# Patient Record
Sex: FEMALE | Race: WHITE | NOT HISPANIC OR LATINO | Employment: OTHER | URBAN - METROPOLITAN AREA
[De-identification: names, ages, dates, MRNs, and addresses within clinical notes are randomized per-mention and may not be internally consistent; named-entity substitution may affect disease eponyms.]

---

## 2023-03-13 ENCOUNTER — TELEPHONE (OUTPATIENT)
Dept: PRIMARY CARE | Facility: CLINIC | Age: 63
End: 2023-03-13

## 2023-03-16 DIAGNOSIS — E78.01 FAMILIAL HYPERCHOLESTEROLEMIA: Primary | ICD-10-CM

## 2023-03-16 PROBLEM — M19.90 INFLAMMATORY OSTEOARTHRITIS: Status: ACTIVE | Noted: 2023-03-16

## 2023-03-16 PROBLEM — I10 HYPERTENSION: Status: ACTIVE | Noted: 2023-03-16

## 2023-03-16 PROBLEM — E78.5 HYPERLIPIDEMIA: Status: ACTIVE | Noted: 2023-03-16

## 2023-03-16 PROBLEM — G47.00 INSOMNIA: Status: ACTIVE | Noted: 2023-03-16

## 2023-03-16 PROBLEM — F32.A MILD DEPRESSION: Status: ACTIVE | Noted: 2023-03-16

## 2023-03-16 RX ORDER — LOSARTAN POTASSIUM 100 MG/1
1 TABLET ORAL DAILY
COMMUNITY
Start: 2021-12-09 | End: 2023-08-31

## 2023-03-16 RX ORDER — FLUTICASONE PROPIONATE 50 MCG
SPRAY, SUSPENSION (ML) NASAL
COMMUNITY
Start: 2022-11-30 | End: 2023-10-06 | Stop reason: ALTCHOICE

## 2023-03-16 RX ORDER — LOSARTAN POTASSIUM AND HYDROCHLOROTHIAZIDE 25; 100 MG/1; MG/1
1 TABLET ORAL DAILY
COMMUNITY
Start: 2022-12-14 | End: 2023-10-06 | Stop reason: ALTCHOICE

## 2023-03-16 RX ORDER — HYDROCHLOROTHIAZIDE 25 MG/1
1 TABLET ORAL DAILY
COMMUNITY
Start: 2021-12-09 | End: 2023-10-20 | Stop reason: ALTCHOICE

## 2023-03-16 RX ORDER — BUPROPION HYDROCHLORIDE 150 MG/1
TABLET ORAL
COMMUNITY
Start: 2023-03-06

## 2023-03-16 RX ORDER — ESCITALOPRAM OXALATE 20 MG/1
TABLET ORAL
COMMUNITY

## 2023-03-16 RX ORDER — ATORVASTATIN CALCIUM 40 MG/1
40 TABLET, FILM COATED ORAL NIGHTLY
COMMUNITY
End: 2023-03-16 | Stop reason: SDUPTHER

## 2023-03-16 RX ORDER — ATORVASTATIN CALCIUM 40 MG/1
40 TABLET, FILM COATED ORAL NIGHTLY
Qty: 90 TABLET | Refills: 3 | Status: SHIPPED | OUTPATIENT
Start: 2023-03-16 | End: 2023-10-06 | Stop reason: ALTCHOICE

## 2023-04-03 DIAGNOSIS — E78.01 FAMILIAL HYPERCHOLESTEROLEMIA: Primary | ICD-10-CM

## 2023-04-05 LAB
ACTIVATED PARTIAL THROMBOPLASTIN TIME IN PPP BY COAGULATION ASSAY: 31 SEC (ref 26–39)
ANION GAP IN SER/PLAS: 13 MMOL/L (ref 10–20)
CALCIUM (MG/DL) IN SER/PLAS: 8.9 MG/DL (ref 8.6–10.3)
CARBON DIOXIDE, TOTAL (MMOL/L) IN SER/PLAS: 25 MMOL/L (ref 21–32)
CHLORIDE (MMOL/L) IN SER/PLAS: 99 MMOL/L (ref 98–107)
CREATININE (MG/DL) IN SER/PLAS: 0.63 MG/DL (ref 0.5–1.05)
ERYTHROCYTE DISTRIBUTION WIDTH (RATIO) BY AUTOMATED COUNT: 13.4 % (ref 11.5–14.5)
ERYTHROCYTE MEAN CORPUSCULAR HEMOGLOBIN CONCENTRATION (G/DL) BY AUTOMATED: 32.7 G/DL (ref 32–36)
ERYTHROCYTE MEAN CORPUSCULAR VOLUME (FL) BY AUTOMATED COUNT: 93 FL (ref 80–100)
ERYTHROCYTES (10*6/UL) IN BLOOD BY AUTOMATED COUNT: 3.68 X10E12/L (ref 4–5.2)
GFR FEMALE: >90 ML/MIN/1.73M2
GLUCOSE (MG/DL) IN SER/PLAS: 87 MG/DL (ref 74–99)
HEMATOCRIT (%) IN BLOOD BY AUTOMATED COUNT: 34.2 % (ref 36–46)
HEMOGLOBIN (G/DL) IN BLOOD: 11.2 G/DL (ref 12–16)
INR IN PPP BY COAGULATION ASSAY: 0.9 (ref 0.9–1.1)
LEUKOCYTES (10*3/UL) IN BLOOD BY AUTOMATED COUNT: 7.1 X10E9/L (ref 4.4–11.3)
NRBC (PER 100 WBCS) BY AUTOMATED COUNT: 0 /100 WBC (ref 0–0)
PLATELETS (10*3/UL) IN BLOOD AUTOMATED COUNT: 311 X10E9/L (ref 150–450)
POTASSIUM (MMOL/L) IN SER/PLAS: 3 MMOL/L (ref 3.5–5.3)
PROTHROMBIN TIME (PT) IN PPP BY COAGULATION ASSAY: 10.9 SEC (ref 9.8–13.4)
SODIUM (MMOL/L) IN SER/PLAS: 134 MMOL/L (ref 136–145)
UREA NITROGEN (MG/DL) IN SER/PLAS: 8 MG/DL (ref 6–23)

## 2023-04-24 ENCOUNTER — HOSPITAL ENCOUNTER (OUTPATIENT)
Dept: DATA CONVERSION | Facility: HOSPITAL | Age: 63
End: 2023-04-24
Attending: THORACIC SURGERY (CARDIOTHORACIC VASCULAR SURGERY) | Admitting: THORACIC SURGERY (CARDIOTHORACIC VASCULAR SURGERY)
Payer: MEDICARE

## 2023-04-24 DIAGNOSIS — C34.11 MALIGNANT NEOPLASM OF UPPER LOBE, RIGHT BRONCHUS OR LUNG (MULTI): ICD-10-CM

## 2023-04-24 DIAGNOSIS — R91.8 OTHER NONSPECIFIC ABNORMAL FINDING OF LUNG FIELD: ICD-10-CM

## 2023-04-26 LAB
COMPLETE PATHOLOGY REPORT: NORMAL
CONVERTED ADDENDUM DIAGNOSIS 2: NORMAL
CONVERTED ADDENDUM DIAGNOSIS 3: NORMAL
CONVERTED CLINICAL DIAGNOSIS-HISTORY: NORMAL
CONVERTED FINAL DIAGNOSIS: NORMAL
CONVERTED FINAL REPORT PDF LINK TO COPY AND PASTE: NORMAL
CONVERTED GROSS DESCRIPTION: NORMAL

## 2023-06-07 ENCOUNTER — PATIENT OUTREACH (OUTPATIENT)
Dept: CARE COORDINATION | Facility: CLINIC | Age: 63
End: 2023-06-07

## 2023-06-09 ENCOUNTER — PATIENT OUTREACH (OUTPATIENT)
Dept: CARE COORDINATION | Facility: CLINIC | Age: 63
End: 2023-06-09

## 2023-06-09 SDOH — ECONOMIC STABILITY: GENERAL: WOULD YOU LIKE HELP WITH ANY OF THE FOLLOWING NEEDS?: I DONT NEED HELP WITH ANY OF THESE

## 2023-06-09 NOTE — PROGRESS NOTES
Outreach call to patient to support a smooth transition of care from recent admission.  Spoke with patient, reviewed discharge medications, discharge instructions, assessed social needs, and provided education on importance of follow-up appointment with provider. Enrolled patient in [employer] for additional support and education through transition period.  Will continue to monitor through transition period.  Engagement  Call Start Time: 1227 (6/9/2023 12:24 PM)    Medications  Medications reviewed with patient/caregiver?: Yes (6/9/2023 12:24 PM)  Is the patient having any side effects they believe may be caused by any medication additions or changes?: No (6/9/2023 12:24 PM)  Does the patient have all medications ordered at discharge?: Yes (6/9/2023 12:24 PM)  Is the patient taking all medications as directed (includes completed medication regime)?: Yes (6/9/2023 12:24 PM)  Care Management Interventions: Provided patient education (6/9/2023 12:24 PM)    Appointments  Does the patient have a primary care provider?: Yes (6/9/2023 12:24 PM)  Care Management Interventions: Verified appointment date/time/provider (Patient has follow-up appointments on Monday 6/12 with PCP and the following Monday with Dr. Spencer) (6/9/2023 12:24 PM)  Care Management Interventions: Advised patient to keep appointment (6/9/2023 12:24 PM)    Patient Teaching  Does the patient have access to their discharge instructions?: Yes (6/9/2023 12:24 PM)  Care Management Interventions: Reviewed instructions with patient (6/9/2023 12:24 PM)  What is the patient's perception of their health status since discharge?: Improving (6/9/2023 12:24 PM)    Wrap Up  Wrap Up Additional Comments: Post-discharge completed with patient.  Patient reported that she has her follow up visits scheduled and is doing good.  Patient has support with monitoring incision site from her daughter.  Patient will have PCP remove and change bandage if needed at follow up.    Patient reported pain in managed.  No other concerns voiced. (6/9/2023 12:24 PM)  Call End Time: 1240 (6/9/2023 12:24 PM)      Isa BRISCOE RN CCM

## 2023-06-12 ENCOUNTER — OFFICE VISIT (OUTPATIENT)
Dept: PRIMARY CARE | Facility: CLINIC | Age: 63
End: 2023-06-12
Payer: COMMERCIAL

## 2023-06-12 VITALS
HEART RATE: 73 BPM | WEIGHT: 130 LBS | BODY MASS INDEX: 22.67 KG/M2 | DIASTOLIC BLOOD PRESSURE: 65 MMHG | TEMPERATURE: 97.7 F | SYSTOLIC BLOOD PRESSURE: 124 MMHG

## 2023-06-12 DIAGNOSIS — G89.18 POSTOPERATIVE PAIN: ICD-10-CM

## 2023-06-12 DIAGNOSIS — C34.11 PRIMARY MALIGNANT NEOPLASM OF RIGHT UPPER LOBE OF LUNG (MULTI): Primary | ICD-10-CM

## 2023-06-12 DIAGNOSIS — C34.91 ADENOCARCINOMA OF RIGHT LUNG (MULTI): ICD-10-CM

## 2023-06-12 DIAGNOSIS — E87.8 ELECTROLYTE ABNORMALITY: ICD-10-CM

## 2023-06-12 PROBLEM — M81.0 OSTEOPOROSIS: Status: ACTIVE | Noted: 2023-06-12

## 2023-06-12 PROBLEM — F17.210 CIGARETTE SMOKER: Status: ACTIVE | Noted: 2023-06-12

## 2023-06-12 PROCEDURE — 3074F SYST BP LT 130 MM HG: CPT | Performed by: FAMILY MEDICINE

## 2023-06-12 PROCEDURE — 3078F DIAST BP <80 MM HG: CPT | Performed by: FAMILY MEDICINE

## 2023-06-12 PROCEDURE — 99214 OFFICE O/P EST MOD 30 MIN: CPT | Performed by: FAMILY MEDICINE

## 2023-06-12 PROCEDURE — 1036F TOBACCO NON-USER: CPT | Performed by: FAMILY MEDICINE

## 2023-06-12 RX ORDER — ALPRAZOLAM 0.5 MG/1
0.5 TABLET ORAL AS NEEDED
COMMUNITY
Start: 2014-05-20

## 2023-06-12 ASSESSMENT — PATIENT HEALTH QUESTIONNAIRE - PHQ9
1. LITTLE INTEREST OR PLEASURE IN DOING THINGS: NOT AT ALL
2. FEELING DOWN, DEPRESSED OR HOPELESS: NOT AT ALL
SUM OF ALL RESPONSES TO PHQ9 QUESTIONS 1 AND 2: 0

## 2023-06-12 NOTE — PROGRESS NOTES
Subjective   Patient ID: Dora Dueñas is a 62 y.o. female who presents for Follow-up (On surgery of the right  lug.).  Very pleasant 62-year-old here today for hospital follow-up  Had an abnormal CT scan was diagnosed with lung cancer underwent resection and she is here today for follow-up  She is having some persistent postoperative pain is somewhat tired but for the most part is doing better her breathing is getting better she did have some significant electrolyte abnormalities pre and postsurgery has been feeling a bit weak and tired no fever  She has been doing her incentive spirometry she has a lot of discomfort particularly around her chest having some constipation a lot of incisional discomfort no vomiting her appetite has been decreased she has been recovering well still struggling to sleep  She is relieved that things are over is waiting to hear her pathology and the plan for how to proceed  She did have a single bout of significant weakness and she is feeling a little bit better each day        Review of Systems  Constitutional: no chills, no fever and no night sweats.   Eyes: no blurred vision and no eyesight problems.   ENT: no hearing loss, no nasal congestion, no nasal discharge, no hoarseness and no sore throat.   Cardiovascular: no chest pain, no intermittent leg claudication, no lower extremity edema, no palpitations and no syncope.   Respiratory: no cough, no shortness of breath during exertion, no shortness of breath at rest and no wheezing.   Gastrointestinal: no abdominal pain, no blood in stools, no constipation, no diarrhea, no melena, no nausea, no rectal pain and no vomiting.   Genitourinary: no dysuria, no change in urinary frequency, no urinary hesitancy, no feelings of urinary urgency and no vaginal discharge.   Musculoskeletal: no arthralgias,  no back pain and no myalgias.   Integumentary: no new skin lesions and no rashes.   Neurological: no difficulty walking, no headache, no limb  weakness, no numbness and no tingling.   Psychiatric: no anxiety, no depression, no anhedonia and no substance use disorders.   Endocrine: no recent weight gain and no recent weight loss.   Hematologic/Lymphatic: no tendency for easy bruising and no swollen glands .    Objective    /65   Pulse 73   Temp 36.5 °C (97.7 °F)   Wt 59 kg (130 lb)   BMI 23.03 kg/m²    Physical Exam  The patient appeared well nourished and normally developed. Vital signs as documented. Head exam is unremarkable. No scleral icterus or corneal arcus noted.  Pupils are equal round reactive to light extraocular movements are intact no hemorrhages noted on funduscopic exam mouth mucous membranes are moist no exudates ears canals clear TMs are gray pearly not injected nose no rhinorrhea or epistaxis Neck is without jugular venous distension, thyromegaly, or carotid bruits. Carotid upstrokes are brisk bilaterally. Lungs are clear to auscultation and percussion. Cardiac exam reveals the PMI to be normally sized and situated. Rhythm is regular. First and second heart sounds normal. No murmurs, rubs or gallops. Abdominal exam reveals normal bowel sounds, no masses, no organomegaly and no aortic enlargement. Extremities are nonedematous and both femoral and pedal pulses are normal.  Neurologic exam DTRs are equal bilaterally no focal deficits strength is symmetrical heme lymph no palpable lymph nodes in the neck axilla or groin  Recent surgical incisions healing well clean and dry  Assessment/Plan   Problem List Items Addressed This Visit       Adenocarcinoma of right lung (CMS/HCC)     Followup surgery  Gradually improving  Continue incentive spirometry         Primary malignant neoplasm of right upper lobe of lung (CMS/HCC) - Primary     Stable recovering  Continue to follow-up with oncology         Electrolyte abnormality    Relevant Orders    Magnesium    Potassium    Postoperative pain     Status post surgery  Here for hospital  follow-up  Continue incentive spirometry  Check blood work for electrolyte abnormality  Make sure you are eating enough protein and staying hydrated  Follow-up in 2 months  Consider pulmonary rehab       Sonia Vuong MD

## 2023-06-27 ENCOUNTER — TELEPHONE (OUTPATIENT)
Dept: CARE COORDINATION | Facility: CLINIC | Age: 63
End: 2023-06-27

## 2023-06-27 ENCOUNTER — DOCUMENTATION (OUTPATIENT)
Dept: CARE COORDINATION | Facility: CLINIC | Age: 63
End: 2023-06-27

## 2023-06-27 PROBLEM — G89.18 POSTOPERATIVE PAIN: Status: ACTIVE | Noted: 2023-06-27

## 2023-06-27 PROBLEM — R35.0 URINARY FREQUENCY: Status: RESOLVED | Noted: 2023-06-27 | Resolved: 2023-06-27

## 2023-06-27 PROBLEM — K64.4 EXTERNAL HEMORRHOIDS: Status: ACTIVE | Noted: 2022-03-30

## 2023-06-27 PROBLEM — K63.5 COLON POLYPS: Status: ACTIVE | Noted: 2022-03-30

## 2023-06-27 PROBLEM — E87.8 ELECTROLYTE ABNORMALITY: Status: ACTIVE | Noted: 2023-06-27

## 2023-06-27 PROBLEM — K57.30 COLON, DIVERTICULOSIS: Status: RESOLVED | Noted: 2022-03-30 | Resolved: 2023-06-27

## 2023-06-27 PROBLEM — F32.0 MILD MAJOR DEPRESSION (CMS-HCC): Status: ACTIVE | Noted: 2023-03-16

## 2023-06-27 PROBLEM — F41.9 ANXIETY: Status: ACTIVE | Noted: 2023-06-27

## 2023-06-27 PROBLEM — R68.89 FLU-LIKE SYMPTOMS: Status: RESOLVED | Noted: 2023-06-27 | Resolved: 2023-06-27

## 2023-06-27 NOTE — PROGRESS NOTES
Dora RENDON Spenser  Program: [facility] Generic Post-Discharge  MRN: [id number]  YOB: 1972    This patient had a RED on Mon, 26 Jun 2023 9:33:01 am EDT    Question(s) with flags that caused the Alert (up to last 5 values   recorded)    Question: ROC-7     Date:     2023-06-26     Color:    red     Answers:  ROC-7 =5    Question: GAD2     Date:     2023-06-26     Color:    red     Answers:  2 or more    Question: Signs of Infection     Date:     2023-06-26     Color:    red     Answers:  Red    Question: Since you left the hospital, have you experienced sweating or   shivering?     Date:     2023-06-26     Color:    red     Answers:  Yes    Patient stated that she is doing in good and was just anxious after she left the doctors office because the biopsy results can take up to a month to get back.  Patient stated she is feeling good.  RN CM educated patient to find great memories, hobbies or things she likes to do if her anxiety tries to consume her.  Patient stated she will.  Patient stated, it's just the waiting process that has me nervous.    Isa SHAFERN RN CCM

## 2023-06-28 NOTE — PATIENT INSTRUCTIONS
Hospital follow-up today  Check blood work  Continue incentive spirometry  Recheck in 2 months otherwise as needed  I recommend pulmonary rehab

## 2023-08-02 LAB
BASOPHILS (10*3/UL) IN BLOOD BY AUTOMATED COUNT: 0.1 X10E9/L (ref 0–0.1)
BASOPHILS/100 LEUKOCYTES IN BLOOD BY AUTOMATED COUNT: 1.2 % (ref 0–2)
EOSINOPHILS (10*3/UL) IN BLOOD BY AUTOMATED COUNT: 0.1 X10E9/L (ref 0–0.7)
EOSINOPHILS/100 LEUKOCYTES IN BLOOD BY AUTOMATED COUNT: 1.2 % (ref 0–6)
ERYTHROCYTE DISTRIBUTION WIDTH (RATIO) BY AUTOMATED COUNT: 13.5 % (ref 11.5–14.5)
ERYTHROCYTE MEAN CORPUSCULAR HEMOGLOBIN CONCENTRATION (G/DL) BY AUTOMATED: 33.3 G/DL (ref 32–36)
ERYTHROCYTE MEAN CORPUSCULAR VOLUME (FL) BY AUTOMATED COUNT: 89 FL (ref 80–100)
ERYTHROCYTES (10*6/UL) IN BLOOD BY AUTOMATED COUNT: 4.51 X10E12/L (ref 4–5.2)
HEMATOCRIT (%) IN BLOOD BY AUTOMATED COUNT: 40.3 % (ref 36–46)
HEMOGLOBIN (G/DL) IN BLOOD: 13.4 G/DL (ref 12–16)
IMMATURE GRANULOCYTES/100 LEUKOCYTES IN BLOOD BY AUTOMATED COUNT: 0.4 % (ref 0–0.9)
LEUKOCYTES (10*3/UL) IN BLOOD BY AUTOMATED COUNT: 8.3 X10E9/L (ref 4.4–11.3)
LYMPHOCYTES (10*3/UL) IN BLOOD BY AUTOMATED COUNT: 3.59 X10E9/L (ref 1.2–4.8)
LYMPHOCYTES/100 LEUKOCYTES IN BLOOD BY AUTOMATED COUNT: 43.5 % (ref 13–44)
MONOCYTES (10*3/UL) IN BLOOD BY AUTOMATED COUNT: 0.45 X10E9/L (ref 0.1–1)
MONOCYTES/100 LEUKOCYTES IN BLOOD BY AUTOMATED COUNT: 5.5 % (ref 2–10)
NEUTROPHILS (10*3/UL) IN BLOOD BY AUTOMATED COUNT: 3.98 X10E9/L (ref 1.2–7.7)
NEUTROPHILS/100 LEUKOCYTES IN BLOOD BY AUTOMATED COUNT: 48.2 % (ref 40–80)
NRBC (PER 100 WBCS) BY AUTOMATED COUNT: 0 /100 WBC (ref 0–0)
PLATELETS (10*3/UL) IN BLOOD AUTOMATED COUNT: 362 X10E9/L (ref 150–450)

## 2023-08-03 LAB
ALANINE AMINOTRANSFERASE (SGPT) (U/L) IN SER/PLAS: 12 U/L (ref 7–45)
ALBUMIN (G/DL) IN SER/PLAS: 4.8 G/DL (ref 3.4–5)
ALKALINE PHOSPHATASE (U/L) IN SER/PLAS: 77 U/L (ref 33–136)
ANION GAP IN SER/PLAS: 16 MMOL/L (ref 10–20)
ASPARTATE AMINOTRANSFERASE (SGOT) (U/L) IN SER/PLAS: 17 U/L (ref 9–39)
BILIRUBIN TOTAL (MG/DL) IN SER/PLAS: 0.9 MG/DL (ref 0–1.2)
CALCIUM (MG/DL) IN SER/PLAS: 9.4 MG/DL (ref 8.6–10.6)
CARBON DIOXIDE, TOTAL (MMOL/L) IN SER/PLAS: 26 MMOL/L (ref 21–32)
CHLORIDE (MMOL/L) IN SER/PLAS: 91 MMOL/L (ref 98–107)
CREATININE (MG/DL) IN SER/PLAS: 0.54 MG/DL (ref 0.5–1.05)
GFR FEMALE: >90 ML/MIN/1.73M2
GLUCOSE (MG/DL) IN SER/PLAS: 110 MG/DL (ref 74–99)
MAGNESIUM (MG/DL) IN SER/PLAS: 2.03 MG/DL (ref 1.6–2.4)
POTASSIUM (MMOL/L) IN SER/PLAS: 3.3 MMOL/L (ref 3.5–5.3)
PROTEIN TOTAL: 8.1 G/DL (ref 6.4–8.2)
SODIUM (MMOL/L) IN SER/PLAS: 130 MMOL/L (ref 136–145)
UREA NITROGEN (MG/DL) IN SER/PLAS: 8 MG/DL (ref 6–23)

## 2023-09-01 LAB
ALANINE AMINOTRANSFERASE (SGPT) (U/L) IN SER/PLAS: 48 U/L (ref 7–45)
ALBUMIN (G/DL) IN SER/PLAS: 4.3 G/DL (ref 3.4–5)
ALKALINE PHOSPHATASE (U/L) IN SER/PLAS: 86 U/L (ref 33–136)
ANION GAP IN SER/PLAS: 16 MMOL/L (ref 10–20)
ASPARTATE AMINOTRANSFERASE (SGOT) (U/L) IN SER/PLAS: 25 U/L (ref 9–39)
BILIRUBIN TOTAL (MG/DL) IN SER/PLAS: 0.3 MG/DL (ref 0–1.2)
CALCIUM (MG/DL) IN SER/PLAS: 9.4 MG/DL (ref 8.6–10.6)
CARBON DIOXIDE, TOTAL (MMOL/L) IN SER/PLAS: 28 MMOL/L (ref 21–32)
CHLORIDE (MMOL/L) IN SER/PLAS: 93 MMOL/L (ref 98–107)
CREATININE (MG/DL) IN SER/PLAS: 0.66 MG/DL (ref 0.5–1.05)
GFR FEMALE: >90 ML/MIN/1.73M2
GLUCOSE (MG/DL) IN SER/PLAS: 89 MG/DL (ref 74–99)
MAGNESIUM (MG/DL) IN SER/PLAS: 1.79 MG/DL (ref 1.6–2.4)
POTASSIUM (MMOL/L) IN SER/PLAS: 3.5 MMOL/L (ref 3.5–5.3)
PROTEIN TOTAL: 7 G/DL (ref 6.4–8.2)
SODIUM (MMOL/L) IN SER/PLAS: 133 MMOL/L (ref 136–145)
UREA NITROGEN (MG/DL) IN SER/PLAS: 14 MG/DL (ref 6–23)

## 2023-09-13 ENCOUNTER — APPOINTMENT (OUTPATIENT)
Dept: PRIMARY CARE | Facility: CLINIC | Age: 63
End: 2023-09-13

## 2023-09-25 VITALS — BODY MASS INDEX: 23.73 KG/M2 | WEIGHT: 128.97 LBS | HEIGHT: 62 IN

## 2023-09-25 DIAGNOSIS — C34.11 PRIMARY MALIGNANT NEOPLASM OF RIGHT UPPER LOBE OF LUNG (MULTI): Primary | ICD-10-CM

## 2023-09-25 PROBLEM — C34.91 ADENOCARCINOMA OF RIGHT LUNG (MULTI): Status: RESOLVED | Noted: 2023-06-12 | Resolved: 2023-09-25

## 2023-09-25 RX ORDER — DIPHENHYDRAMINE HYDROCHLORIDE 50 MG/ML
50 INJECTION INTRAMUSCULAR; INTRAVENOUS AS NEEDED
Status: CANCELLED | OUTPATIENT
Start: 2023-10-09

## 2023-09-25 RX ORDER — FAMOTIDINE 10 MG/ML
20 INJECTION INTRAVENOUS ONCE AS NEEDED
Status: CANCELLED | OUTPATIENT
Start: 2023-10-09

## 2023-09-25 RX ORDER — HEPARIN 100 UNIT/ML
500 SYRINGE INTRAVENOUS AS NEEDED
Status: CANCELLED | OUTPATIENT
Start: 2023-10-09

## 2023-09-25 RX ORDER — ALBUTEROL SULFATE 0.83 MG/ML
3 SOLUTION RESPIRATORY (INHALATION) AS NEEDED
Status: CANCELLED | OUTPATIENT
Start: 2023-10-09

## 2023-09-25 RX ORDER — EPINEPHRINE 0.3 MG/.3ML
0.3 INJECTION SUBCUTANEOUS EVERY 5 MIN PRN
Status: CANCELLED | OUTPATIENT
Start: 2023-10-09

## 2023-09-25 RX ORDER — PROCHLORPERAZINE EDISYLATE 5 MG/ML
10 INJECTION INTRAMUSCULAR; INTRAVENOUS EVERY 6 HOURS PRN
Status: CANCELLED | OUTPATIENT
Start: 2023-10-09

## 2023-09-25 RX ORDER — CYANOCOBALAMIN 1000 UG/ML
1000 INJECTION, SOLUTION INTRAMUSCULAR; SUBCUTANEOUS ONCE
Status: CANCELLED | OUTPATIENT
Start: 2023-10-09

## 2023-09-25 RX ORDER — HEPARIN SODIUM,PORCINE/PF 10 UNIT/ML
50 SYRINGE (ML) INTRAVENOUS AS NEEDED
Status: CANCELLED | OUTPATIENT
Start: 2023-10-09

## 2023-09-25 RX ORDER — PALONOSETRON 0.05 MG/ML
0.25 INJECTION, SOLUTION INTRAVENOUS ONCE
Status: CANCELLED | OUTPATIENT
Start: 2023-10-09

## 2023-09-25 RX ORDER — PROCHLORPERAZINE MALEATE 10 MG
10 TABLET ORAL EVERY 6 HOURS PRN
Status: CANCELLED | OUTPATIENT
Start: 2023-10-09

## 2023-10-05 PROBLEM — R91.8 GROUND GLASS OPACITY PRESENT ON IMAGING OF LUNG: Status: ACTIVE | Noted: 2023-10-05

## 2023-10-05 PROBLEM — Z04.9 CONDITION NOT FOUND: Status: ACTIVE | Noted: 2023-10-05

## 2023-10-05 PROBLEM — R31.9 HEMATURIA: Status: ACTIVE | Noted: 2023-10-05

## 2023-10-05 PROBLEM — R07.9 LEFT-SIDED CHEST PAIN: Status: ACTIVE | Noted: 2023-10-05

## 2023-10-05 PROBLEM — E87.6 ACUTE HYPOKALEMIA: Status: ACTIVE | Noted: 2023-10-05

## 2023-10-05 RX ORDER — VIT C/E/ZN/COPPR/LUTEIN/ZEAXAN 250MG-90MG
25 CAPSULE ORAL DAILY
COMMUNITY

## 2023-10-05 RX ORDER — ONDANSETRON 8 MG/1
8 TABLET, ORALLY DISINTEGRATING ORAL EVERY 8 HOURS PRN
COMMUNITY
Start: 2023-08-08 | End: 2023-10-20 | Stop reason: ALTCHOICE

## 2023-10-05 RX ORDER — FOLIC ACID 1 MG/1
1 TABLET ORAL DAILY
COMMUNITY
Start: 2023-08-27 | End: 2023-12-12 | Stop reason: ALTCHOICE

## 2023-10-05 RX ORDER — DEXAMETHASONE 4 MG/1
2 TABLET ORAL EVERY MORNING
COMMUNITY
Start: 2023-07-28 | End: 2023-10-20 | Stop reason: ALTCHOICE

## 2023-10-05 RX ORDER — PROCHLORPERAZINE MALEATE 5 MG
5 TABLET ORAL EVERY 6 HOURS PRN
COMMUNITY
Start: 2023-08-06 | End: 2023-10-20 | Stop reason: ALTCHOICE

## 2023-10-05 RX ORDER — OLANZAPINE 5 MG/1
5 TABLET ORAL NIGHTLY
COMMUNITY
Start: 2023-07-28 | End: 2023-10-20 | Stop reason: ALTCHOICE

## 2023-10-05 RX ORDER — POTASSIUM CHLORIDE 1500 MG/1
20 TABLET, EXTENDED RELEASE ORAL DAILY
COMMUNITY
Start: 2023-08-28 | End: 2023-10-06 | Stop reason: SDUPTHER

## 2023-10-06 ENCOUNTER — OFFICE VISIT (OUTPATIENT)
Dept: HEMATOLOGY/ONCOLOGY | Facility: CLINIC | Age: 63
End: 2023-10-06
Payer: MEDICARE

## 2023-10-06 VITALS
HEIGHT: 62 IN | DIASTOLIC BLOOD PRESSURE: 79 MMHG | RESPIRATION RATE: 14 BRPM | TEMPERATURE: 97.9 F | OXYGEN SATURATION: 97 % | WEIGHT: 128.97 LBS | SYSTOLIC BLOOD PRESSURE: 133 MMHG | BODY MASS INDEX: 23.73 KG/M2 | HEART RATE: 72 BPM

## 2023-10-06 DIAGNOSIS — C34.11 PRIMARY MALIGNANT NEOPLASM OF RIGHT UPPER LOBE OF LUNG (MULTI): ICD-10-CM

## 2023-10-06 DIAGNOSIS — E87.8 ELECTROLYTE ABNORMALITY: Primary | ICD-10-CM

## 2023-10-06 PROCEDURE — 99215 OFFICE O/P EST HI 40 MIN: CPT | Performed by: INTERNAL MEDICINE

## 2023-10-06 PROCEDURE — 1126F AMNT PAIN NOTED NONE PRSNT: CPT | Performed by: INTERNAL MEDICINE

## 2023-10-06 PROCEDURE — 3075F SYST BP GE 130 - 139MM HG: CPT | Performed by: INTERNAL MEDICINE

## 2023-10-06 PROCEDURE — 1159F MED LIST DOCD IN RCRD: CPT | Performed by: INTERNAL MEDICINE

## 2023-10-06 PROCEDURE — 3078F DIAST BP <80 MM HG: CPT | Performed by: INTERNAL MEDICINE

## 2023-10-06 PROCEDURE — 4004F PT TOBACCO SCREEN RCVD TLK: CPT | Performed by: INTERNAL MEDICINE

## 2023-10-06 PROCEDURE — 1160F RVW MEDS BY RX/DR IN RCRD: CPT | Performed by: INTERNAL MEDICINE

## 2023-10-06 RX ORDER — POTASSIUM CHLORIDE 1500 MG/1
20 TABLET, EXTENDED RELEASE ORAL DAILY
Qty: 30 TABLET | Refills: 0 | Status: SHIPPED | OUTPATIENT
Start: 2023-10-06 | End: 2023-11-05

## 2023-10-06 ASSESSMENT — ENCOUNTER SYMPTOMS
DIARRHEA: 0
ARTHRALGIAS: 0
COUGH: 0
LEG SWELLING: 0
DIZZINESS: 0
NAUSEA: 0
ADENOPATHY: 0
ABDOMINAL PAIN: 0
FATIGUE: 1
DEPRESSION: 0
OCCASIONAL FEELINGS OF UNSTEADINESS: 0
UNEXPECTED WEIGHT CHANGE: 0
LOSS OF SENSATION IN FEET: 0
EYE PROBLEMS: 0
CONSTIPATION: 0
APPETITE CHANGE: 0
HEADACHES: 0
DIFFICULTY URINATING: 0
BACK PAIN: 0
SHORTNESS OF BREATH: 0
VOMITING: 0

## 2023-10-06 ASSESSMENT — PAIN SCALES - GENERAL: PAINLEVEL: 0-NO PAIN

## 2023-10-06 NOTE — PROGRESS NOTES
[facility] - Medical Oncology Follow-Up Visit    Patient ID: Dora Dueñas is a 63 y.o. female with stage IIb lung adenocarcinoma s/p RUL segmentectomy, with TP53 mutation and PD-L1 60%.     Current therapy: Adjuvant cisplatin/pemetrexed     Chief Concern: Here for followup and treatment readiness visit    HPI: Patient reports overall doing well, tolerating chemotherapy. Some fatigue after treatments. Minimal nausea. She does get mouth sores which are painful for about 3-4 days after treatment, using salt/soda mouthwash but pain makes it hard to eat those days. Taste is off, +metallic taste of foods. Had ringing in her ears 1 night after most recent chemotherapy.    Diagnosis: Lung adenocarcinoma  Stage: Cancer Staging   Primary malignant neoplasm of right upper lobe of lung (CMS/HCC)  Staging form: Lung, AJCC 8th Edition  - Pathologic: Stage IIB (pT1c, pN1, cM0) - Signed by Keena Roy MD on 9/25/2023     Current sites of disease: JULITO s/p RUL segmentectomy  Molecular and ancillary testing:   NGS with TP53 mutation  PD-L1 60%    Oncologic History:  1/11/23 - cardiac CT with RUL nodule  2/14/23 - CT chest with RUL nodule  3/17/23 - PET with FDG-avid RUL nodule no suspicious LNs  6/5/23 - RUL segmentectomy and LN dissection with pT1cN1 adenocarcinoma; 1 peribronchial LN positive for malignancy    Oncology History   Primary malignant neoplasm of right upper lobe of lung (CMS/HCC)   6/12/2023 Initial Diagnosis    Primary malignant neoplasm of right upper lobe of lung (CMS/HCC)     8/7/2023 -  Chemotherapy    PEMEtrexed / CISplatin, 21 Day Cycles - Thoracic      9/25/2023 Cancer Staged    Staging form: Lung, AJCC 8th Edition, Pathologic: Stage IIB (pT1c, pN1, cM0) - Signed by Keena Roy MD on 9/25/2023       Past Medical/Surgical Hx:   HTN  HLD  osteoarthritis  osteoporosis     Social Hx:   +tobacco use 1ppd since age 23, has quit at times in the past including recently prior to  surgery, recently started again. Laser therapy helped for 3m  Lives at home with adult son whom she cares for (has autism) and her adult daughter who is a big support   Lots of friends and community who support her as well     Family Hx:   Paternal grandfather had lung cancer - was a smoker    Meds (Current):    Current Outpatient Medications:     ALPRAZolam (Xanax) 0.5 mg tablet, Take 1 tablet (0.5 mg) by mouth if needed., Disp: , Rfl:     buPROPion XL (Wellbutrin XL) 150 mg 24 hr tablet, TAKE ONE TABLET BY MOUTH IN THE MORNING AS DIRECTED, Disp: , Rfl:     cholecalciferol (Vitamin D-3) 25 MCG (1000 UT) capsule, Take 1 capsule (25 mcg) by mouth once daily., Disp: , Rfl:     dexAMETHasone (Decadron) 4 mg tablet, Take 2 tablets (8 mg) by mouth once daily in the morning. ON THE FIRST, SECOND, AND THIRD DAYS AFTER CHEMOTHERAPY, Disp: , Rfl:     escitalopram (Lexapro) 20 mg tablet, TAKE TWO TABLETS BY MOUTH DAILY IN THE MORNING AS DIRECTED., Disp: , Rfl:     folic acid (Folvite) 1 mg tablet, Take 1 tablet (1 mg) by mouth once daily., Disp: , Rfl:     hydroCHLOROthiazide (HYDRODiuril) 25 mg tablet, Take 1 tablet (25 mg) by mouth once daily., Disp: , Rfl:     losartan (Cozaar) 100 mg tablet, TAKE ONE TABLET BY MOUTH EVERY DAY, Disp: 90 tablet, Rfl: 0    OLANZapine (ZyPREXA) 5 mg tablet, Take 1 tablet (5 mg) by mouth once daily at bedtime. STARTING THE NIGHT OF CHEMOTHERAPY FOR A TOTAL OF 4 NIGHTS, Disp: , Rfl:     ondansetron ODT (Zofran-ODT) 8 mg disintegrating tablet, Take 1 tablet (8 mg) by mouth every 8 hours if needed. STARTING ON DAY 3 AFTER CHEMOTHERAPY, Disp: , Rfl:     potassium chloride CR 20 mEq ER tablet, Take 1 tablet (20 mEq) by mouth once daily., Disp: 30 tablet, Rfl: 0    prochlorperazine (Compazine) 5 mg tablet, Take 1 tablet (5 mg) by mouth every 6 hours if needed., Disp: , Rfl:     Review of Systems   Constitutional:  Positive for fatigue. Negative for appetite change and unexpected weight change.  "  HENT:   Negative for hearing loss.    Eyes:  Negative for eye problems.   Respiratory:  Negative for cough and shortness of breath.    Cardiovascular:  Negative for chest pain and leg swelling.   Gastrointestinal:  Negative for abdominal pain, constipation, diarrhea, nausea and vomiting.   Genitourinary:  Negative for difficulty urinating.    Musculoskeletal:  Negative for arthralgias and back pain.   Skin:  Negative for rash.   Neurological:  Negative for dizziness and headaches.   Hematological:  Negative for adenopathy.        Objective   BSA: 1.6 meters squared  /79 (BP Location: Left arm, Patient Position: Sitting)   Pulse 72   Temp 36.6 °C (97.9 °F) (Temporal)   Resp 14   Ht 1.581 m (5' 2.24\")   Wt 58.5 kg (128 lb 15.5 oz)   SpO2 97%   BMI 23.40 kg/m²   Performance Status:  Asymptomatic - ECOG 0     Physical Exam  Vitals and nursing note reviewed.   Constitutional:       General: She is not in acute distress.  HENT:      Head: Normocephalic and atraumatic.   Eyes:      Pupils: Pupils are equal, round, and reactive to light.   Cardiovascular:      Rate and Rhythm: Normal rate and regular rhythm.      Heart sounds: Normal heart sounds.   Pulmonary:      Effort: Pulmonary effort is normal.      Breath sounds: Normal breath sounds.   Abdominal:      General: There is no distension.   Musculoskeletal:         General: No swelling.   Skin:     General: Skin is warm and dry.      Findings: No rash.   Neurological:      General: No focal deficit present.      Mental Status: She is alert and oriented to person, place, and time.   Psychiatric:         Mood and Affect: Mood normal.         Behavior: Behavior normal.      Results:  Labs:  Lab Results   Component Value Date    WBC 6.4 09/18/2023    HGB 12.7 09/18/2023    HCT 37.7 09/18/2023    MCV 90 09/18/2023     09/18/2023      Lab Results   Component Value Date    NEUTROABS 3.30 09/18/2023      Lab Results   Component Value Date    GLUCOSE 104 (H) " 09/18/2023    CALCIUM 9.6 09/18/2023     (L) 09/18/2023    K 4.0 09/18/2023    CO2 24 09/18/2023    CL 96 (L) 09/18/2023    BUN 8 09/18/2023    CREATININE 0.66 09/18/2023    MG 1.79 09/01/2023     Lab Results   Component Value Date    ALT 14 09/18/2023    AST 16 09/18/2023    ALKPHOS 85 09/18/2023    BILITOT 0.6 09/18/2023      Imaging:  No new imaging    Pathology:  No new pathology    Assessment/Plan      Dora Dueñas is a 63 y.o. female here for follow up of stage IIb lung adenocarcinoma with PD-L1 60% and no actionable mutations on NGS, currently receiving adjuvant chemotherapy after RUL segmentectomy.    Reviewed overall work-up and findings to date with patient previously, including implications of stage II disease in terms of risk of recurrence and role of adjuvant therapy in helping to minimize risk of recurrence and improving survival.  Discussed that adjuvant  therapy would consist of 4 cycles of cisplatin/pemetrexed, given every 3 weeks.      She is tolerating adjuvant chemotherapy well.     Today discussed role of adjuvant atezolizumab for up to 1 year per [id number] trial - discussed improvement in EFS, overall survival data immature but initial data suggests trend towards benefit. Discussed logistics and side effects of atezolizumab including but not limited to risk of immune-related AEs - most commonly thyroiditis, rash, colitis, fatigue but also rare risk of pneumonitis, myocarditis, nephritis, hepatitis, and other endocrine or neurologic AEs.     #Stage IIb lung adenocarcinoma -   - Proceed with adjuvant cisplatin/pemetrexed, cycle 4 on Mon  - Rx in place for folic acid and patient advised to cont  - B12 injection D1 of chemo and with C4  - Plan dexamethasone on days 2, 3, and 4 after chemo  - plan zyprexa 5mg on night of chemo and for 3 nights after  - Zofran/compazine PRN in place   - has not needed IV hydration  - Repeat CT Chest after completion of adjuvant chemo and prior to adjuvant  atezolizumab  - Plan to start adjuvant atezo about 4 weeks after C4 of chemo    #Mucositis - due to chemotherapy  - Limits oral intake  - Cont salt and soda mouthwash  - Called in script for BMX mouth wash as needed     # Hypokalemia  - Improved, continue daily potassium supplement through chemotherapy     # Hyponatremia (chronic) baseline 124-134)   - Pt asymptomatic  - Will continue to monitor     RTC in 4 weeks for start of atezolizumab    Keena Roy MD  [facility]

## 2023-10-08 ASSESSMENT — PATIENT HEALTH QUESTIONNAIRE - PHQ9
8. MOVING OR SPEAKING SO SLOWLY THAT OTHER PEOPLE COULD HAVE NOTICED. OR THE OPPOSITE, BEING SO FIGETY OR RESTLESS THAT YOU HAVE BEEN MOVING AROUND A LOT MORE THAN USUAL: NOT AT ALL
6. FEELING BAD ABOUT YOURSELF - OR THAT YOU ARE A FAILURE OR HAVE LET YOURSELF OR YOUR FAMILY DOWN: NOT AT ALL
6. FEELING BAD ABOUT YOURSELF - OR THAT YOU ARE A FAILURE OR HAVE LET YOURSELF OR YOUR FAMILY DOWN: NOT AT ALL
7. TROUBLE CONCENTRATING ON THINGS, SUCH AS READING THE NEWSPAPER OR WATCHING TELEVISION: NOT AT ALL
SUM OF ALL RESPONSES TO PHQ QUESTIONS 1-9: 0
3. TROUBLE FALLING OR STAYING ASLEEP OR SLEEPING TOO MUCH: 0
5. POOR APPETITE OR OVEREATING: NOT AT ALL
2. FEELING DOWN, DEPRESSED OR HOPELESS: NOT AT ALL
4. FEELING TIRED OR HAVING LITTLE ENERGY: 0
8. MOVING OR SPEAKING SO SLOWLY THAT OTHER PEOPLE COULD HAVE NOTICED. OR THE OPPOSITE, BEING SO FIGETY OR RESTLESS THAT YOU HAVE BEEN MOVING AROUND A LOT MORE THAN USUAL: NOT AT ALL
SUM OF ALL RESPONSES TO PHQ QUESTIONS 1-9: 0
10. IF YOU CHECKED OFF ANY PROBLEMS, HOW DIFFICULT HAVE THESE PROBLEMS MADE IT FOR YOU TO DO YOUR WORK, TAKE CARE OF THINGS AT HOME, OR GET ALONG WITH OTHER PEOPLE: NOT DIFFICULT AT ALL
1. LITTLE INTEREST OR PLEASURE IN DOING THINGS: NOT AT ALL
3. TROUBLE FALLING OR STAYING ASLEEP OR SLEEPING TOO MUCH: NOT AT ALL
5. POOR APPETITE OR OVEREATING: 0
4. FEELING TIRED OR HAVING LITTLE ENERGY: NOT AT ALL
7. TROUBLE CONCENTRATING ON THINGS, SUCH AS READING THE NEWSPAPER OR WATCHING TELEVISION: 0
4. FEELING TIRED OR HAVING LITTLE ENERGY: NOT AT ALL
7. TROUBLE CONCENTRATING ON THINGS, SUCH AS READING THE NEWSPAPER OR WATCHING TELEVISION: NOT AT ALL
9. THOUGHTS THAT YOU WOULD BE BETTER OFF DEAD, OR OF HURTING YOURSELF: 0
9. THOUGHTS THAT YOU WOULD BE BETTER OFF DEAD, OR OF HURTING YOURSELF: NOT AT ALL
9. THOUGHTS THAT YOU WOULD BE BETTER OFF DEAD, OR OF HURTING YOURSELF: NOT AT ALL
1. LITTLE INTEREST OR PLEASURE IN DOING THINGS: NOT AT ALL
6. FEELING BAD ABOUT YOURSELF - OR THAT YOU ARE A FAILURE OR HAVE LET YOURSELF OR YOUR FAMILY DOWN: 0
3. TROUBLE FALLING OR STAYING ASLEEP OR SLEEPING TOO MUCH: NOT AT ALL
2. FEELING DOWN, DEPRESSED, IRRITABLE, OR HOPELESS: NOT AT ALL
5. POOR APPETITE OR OVEREATING: NOT AT ALL
2. FEELING DOWN, DEPRESSED, IRRITABLE, OR HOPELESS: 0
10. IF YOU CHECKED OFF ANY PROBLEMS, HOW DIFFICULT HAVE THESE PROBLEMS MADE IT FOR YOU TO DO YOUR WORK, TAKE CARE OF THINGS AT HOME, OR GET ALONG WITH OTHER PEOPLE: NOT DIFFICULT AT ALL
8. MOVING OR SPEAKING SO SLOWLY THAT OTHER PEOPLE COULD HAVE NOTICED. OR THE OPPOSITE, BEING SO FIGETY OR RESTLESS THAT YOU HAVE BEEN MOVING AROUND A LOT MORE THAN USUAL: 0
1. LITTLE INTEREST OR PLEASURE IN DOING THINGS: 0

## 2023-10-09 ENCOUNTER — INFUSION (OUTPATIENT)
Dept: HEMATOLOGY/ONCOLOGY | Facility: CLINIC | Age: 63
End: 2023-10-09
Payer: MEDICARE

## 2023-10-09 VITALS
OXYGEN SATURATION: 100 % | WEIGHT: 128.2 LBS | RESPIRATION RATE: 18 BRPM | HEART RATE: 71 BPM | DIASTOLIC BLOOD PRESSURE: 79 MMHG | SYSTOLIC BLOOD PRESSURE: 139 MMHG | TEMPERATURE: 96.8 F | BODY MASS INDEX: 23.26 KG/M2

## 2023-10-09 DIAGNOSIS — C34.11 PRIMARY MALIGNANT NEOPLASM OF RIGHT UPPER LOBE OF LUNG (MULTI): ICD-10-CM

## 2023-10-09 LAB
ALBUMIN SERPL BCP-MCNC: 4.8 G/DL (ref 3.4–5)
ALP SERPL-CCNC: 82 U/L (ref 33–136)
ALT SERPL W P-5'-P-CCNC: 13 U/L (ref 7–45)
ANION GAP SERPL CALC-SCNC: 14 MMOL/L (ref 10–20)
AST SERPL W P-5'-P-CCNC: 16 U/L (ref 9–39)
BASOPHILS # BLD AUTO: 0.02 X10*3/UL (ref 0–0.1)
BASOPHILS NFR BLD AUTO: 0.4 %
BILIRUB SERPL-MCNC: 0.5 MG/DL (ref 0–1.2)
BUN SERPL-MCNC: 9 MG/DL (ref 6–23)
CALCIUM SERPL-MCNC: 9.5 MG/DL (ref 8.6–10.3)
CHLORIDE SERPL-SCNC: 101 MMOL/L (ref 98–107)
CO2 SERPL-SCNC: 25 MMOL/L (ref 21–32)
CREAT SERPL-MCNC: 0.69 MG/DL (ref 0.5–1.05)
EOSINOPHIL # BLD AUTO: 0.06 X10*3/UL (ref 0–0.7)
EOSINOPHIL NFR BLD AUTO: 1.1 %
ERYTHROCYTE [DISTWIDTH] IN BLOOD BY AUTOMATED COUNT: 15.6 % (ref 11.5–14.5)
GFR SERPL CREATININE-BSD FRML MDRD: >90 ML/MIN/1.73M*2
GLUCOSE SERPL-MCNC: 86 MG/DL (ref 74–99)
HCT VFR BLD AUTO: 37 % (ref 36–46)
HGB BLD-MCNC: 12.2 G/DL (ref 12–16)
IMM GRANULOCYTES # BLD AUTO: 0 X10*3/UL (ref 0–0.7)
IMM GRANULOCYTES NFR BLD AUTO: 0 % (ref 0–0.9)
LYMPHOCYTES # BLD AUTO: 1.78 X10*3/UL (ref 1.2–4.8)
LYMPHOCYTES NFR BLD AUTO: 32.7 %
MAGNESIUM SERPL-MCNC: 1.66 MG/DL (ref 1.6–2.4)
MCH RBC QN AUTO: 31.2 PG (ref 26–34)
MCHC RBC AUTO-ENTMCNC: 33 G/DL (ref 32–36)
MCV RBC AUTO: 95 FL (ref 80–100)
MONOCYTES # BLD AUTO: 0.62 X10*3/UL (ref 0.1–1)
MONOCYTES NFR BLD AUTO: 11.4 %
NEUTROPHILS # BLD AUTO: 2.97 X10*3/UL (ref 1.2–7.7)
NEUTROPHILS NFR BLD AUTO: 54.4 %
NRBC BLD-RTO: ABNORMAL /100{WBCS}
PLATELET # BLD AUTO: 229 X10*3/UL (ref 150–450)
PMV BLD AUTO: 9.1 FL (ref 7.5–11.5)
POTASSIUM SERPL-SCNC: 3.9 MMOL/L (ref 3.5–5.3)
PROT SERPL-MCNC: 7.7 G/DL (ref 6.4–8.2)
RBC # BLD AUTO: 3.91 X10*6/UL (ref 4–5.2)
SODIUM SERPL-SCNC: 136 MMOL/L (ref 136–145)
WBC # BLD AUTO: 5.5 X10*3/UL (ref 4.4–11.3)

## 2023-10-09 PROCEDURE — 80053 COMPREHEN METABOLIC PANEL: CPT | Performed by: INTERNAL MEDICINE

## 2023-10-09 PROCEDURE — 96374 THER/PROPH/DIAG INJ IV PUSH: CPT

## 2023-10-09 PROCEDURE — 2500000004 HC RX 250 GENERAL PHARMACY W/ HCPCS (ALT 636 FOR OP/ED): Mod: SE | Performed by: INTERNAL MEDICINE

## 2023-10-09 PROCEDURE — 36415 COLL VENOUS BLD VENIPUNCTURE: CPT

## 2023-10-09 PROCEDURE — 96361 HYDRATE IV INFUSION ADD-ON: CPT

## 2023-10-09 PROCEDURE — 85025 COMPLETE CBC W/AUTO DIFF WBC: CPT | Performed by: INTERNAL MEDICINE

## 2023-10-09 PROCEDURE — 2500000004 HC RX 250 GENERAL PHARMACY W/ HCPCS (ALT 636 FOR OP/ED): Mod: JZ,JG,SE | Performed by: INTERNAL MEDICINE

## 2023-10-09 PROCEDURE — 96375 TX/PRO/DX INJ NEW DRUG ADDON: CPT

## 2023-10-09 PROCEDURE — 96367 TX/PROPH/DG ADDL SEQ IV INF: CPT

## 2023-10-09 PROCEDURE — 83735 ASSAY OF MAGNESIUM: CPT | Performed by: INTERNAL MEDICINE

## 2023-10-09 PROCEDURE — 96413 CHEMO IV INFUSION 1 HR: CPT

## 2023-10-09 PROCEDURE — 96411 CHEMO IV PUSH ADDL DRUG: CPT

## 2023-10-09 RX ORDER — PROCHLORPERAZINE EDISYLATE 5 MG/ML
10 INJECTION INTRAMUSCULAR; INTRAVENOUS EVERY 6 HOURS PRN
Status: DISCONTINUED | OUTPATIENT
Start: 2023-10-09 | End: 2023-10-09 | Stop reason: HOSPADM

## 2023-10-09 RX ORDER — ALBUTEROL SULFATE 0.83 MG/ML
3 SOLUTION RESPIRATORY (INHALATION) AS NEEDED
Status: DISCONTINUED | OUTPATIENT
Start: 2023-10-09 | End: 2023-10-09 | Stop reason: HOSPADM

## 2023-10-09 RX ORDER — PALONOSETRON 0.05 MG/ML
0.25 INJECTION, SOLUTION INTRAVENOUS ONCE
Status: COMPLETED | OUTPATIENT
Start: 2023-10-09 | End: 2023-10-09

## 2023-10-09 RX ORDER — FAMOTIDINE 10 MG/ML
20 INJECTION INTRAVENOUS ONCE AS NEEDED
Status: DISCONTINUED | OUTPATIENT
Start: 2023-10-09 | End: 2023-10-09 | Stop reason: HOSPADM

## 2023-10-09 RX ORDER — HEPARIN 100 UNIT/ML
500 SYRINGE INTRAVENOUS AS NEEDED
Status: CANCELLED | OUTPATIENT
Start: 2023-10-09

## 2023-10-09 RX ORDER — DIPHENHYDRAMINE HYDROCHLORIDE 50 MG/ML
50 INJECTION INTRAMUSCULAR; INTRAVENOUS AS NEEDED
Status: DISCONTINUED | OUTPATIENT
Start: 2023-10-09 | End: 2023-10-09 | Stop reason: HOSPADM

## 2023-10-09 RX ORDER — HEPARIN SODIUM,PORCINE/PF 10 UNIT/ML
50 SYRINGE (ML) INTRAVENOUS AS NEEDED
Status: CANCELLED | OUTPATIENT
Start: 2023-10-09

## 2023-10-09 RX ORDER — CYANOCOBALAMIN 1000 UG/ML
1000 INJECTION, SOLUTION INTRAMUSCULAR; SUBCUTANEOUS ONCE
Status: DISCONTINUED | OUTPATIENT
Start: 2023-10-09 | End: 2023-10-09 | Stop reason: HOSPADM

## 2023-10-09 RX ORDER — PROCHLORPERAZINE MALEATE 10 MG
10 TABLET ORAL EVERY 6 HOURS PRN
Status: DISCONTINUED | OUTPATIENT
Start: 2023-10-09 | End: 2023-10-09 | Stop reason: HOSPADM

## 2023-10-09 RX ORDER — EPINEPHRINE 0.3 MG/.3ML
0.3 INJECTION SUBCUTANEOUS EVERY 5 MIN PRN
Status: DISCONTINUED | OUTPATIENT
Start: 2023-10-09 | End: 2023-10-09 | Stop reason: HOSPADM

## 2023-10-09 RX ADMIN — MAGNESIUM SULFATE HEPTAHYDRATE 1000 ML/HR: 500 INJECTION, SOLUTION INTRAMUSCULAR; INTRAVENOUS at 10:51

## 2023-10-09 RX ADMIN — CISPLATIN 120 MG: 1 INJECTION, SOLUTION INTRAVENOUS at 13:34

## 2023-10-09 RX ADMIN — FOSAPREPITANT 150 MG: 150 INJECTION, POWDER, LYOPHILIZED, FOR SOLUTION INTRAVENOUS at 12:18

## 2023-10-09 RX ADMIN — PALONOSETRON 250 MCG: 0.05 INJECTION, SOLUTION INTRAVENOUS at 11:58

## 2023-10-09 RX ADMIN — DEXAMETHASONE SODIUM PHOSPHATE 12 MG: 10 INJECTION, SOLUTION INTRAMUSCULAR; INTRAVENOUS at 11:58

## 2023-10-09 RX ADMIN — PEMETREXED DISODIUM 800 MG: 500 INJECTION, POWDER, LYOPHILIZED, FOR SOLUTION INTRAVENOUS at 13:06

## 2023-10-09 RX ADMIN — SODIUM CHLORIDE 1000 ML: 9 INJECTION, SOLUTION INTRAVENOUS at 14:51

## 2023-10-09 ASSESSMENT — COLUMBIA-SUICIDE SEVERITY RATING SCALE - C-SSRS
1. IN THE PAST MONTH, HAVE YOU WISHED YOU WERE DEAD OR WISHED YOU COULD GO TO SLEEP AND NOT WAKE UP?: NO
6. HAVE YOU EVER DONE ANYTHING, STARTED TO DO ANYTHING, OR PREPARED TO DO ANYTHING TO END YOUR LIFE?: NO
2. HAVE YOU ACTUALLY HAD ANY THOUGHTS OF KILLING YOURSELF?: NO

## 2023-10-09 ASSESSMENT — PATIENT HEALTH QUESTIONNAIRE - PHQ9
SUM OF ALL RESPONSES TO PHQ9 QUESTIONS 1 AND 2: 0
1. LITTLE INTEREST OR PLEASURE IN DOING THINGS: NOT AT ALL
2. FEELING DOWN, DEPRESSED OR HOPELESS: NOT AT ALL

## 2023-10-09 ASSESSMENT — PAIN SCALES - GENERAL: PAINLEVEL: 0-NO PAIN

## 2023-10-09 NOTE — SIGNIFICANT EVENT
10/09/23 1113   Prechemo Checklist   Has the patient been in the hospital, ED, or urgent care since last date of service No   Chemo/Immuno Consent Signed Yes   Protocol/Indications Verified Yes   Confirmed to previous date/time of medication Yes   Compared to previous dose Yes   All medications are dated accurately Yes   Pregnancy Test Negative Not applicable   Parameters Met Yes   BSA/Weight-Height Verified Yes   Dose Calculations Verified Yes

## 2023-10-16 ENCOUNTER — NURSE TRIAGE (OUTPATIENT)
Dept: ADMISSION | Facility: HOSPITAL | Age: 63
End: 2023-10-16
Payer: MEDICAID

## 2023-10-16 NOTE — TELEPHONE ENCOUNTER
Per Dr. Roy, pt advised to try flonase nasal spray every morning.  She can also try artificial tears eye drops which sometimes help.  If cough is bothersome at night, ok to use Robitussin cough suppressant.  Symptoms should start to get better with mor time from chemo but not a lot of other quick fixes.  Pt verbalized understanding.

## 2023-10-16 NOTE — TELEPHONE ENCOUNTER
Pt has had nasal congestion for 10 days and now has a non-productive cough.  She's been using claritin as recommended by Dr. Roy but no longer feels it is helping.  She asks what else she can take.  Pt will try gargling salt water for sore throat from post nasal drip.  Message sent to team for other recommendations.  Additional Information   Negative: Are medicines or other measures helping the cough?     Using claritin as recommended   Commented on: How long have your problems been going on?     Started 10 days ago   Commented on: What else do you want to tell me about this problem?     Pt only coughing at night which she feels is related to post-nasal drip.  Her throat is sore from this drainage.    Protocols used: Cough

## 2023-10-18 DIAGNOSIS — C34.11 PRIMARY MALIGNANT NEOPLASM OF RIGHT UPPER LOBE OF LUNG (MULTI): Primary | ICD-10-CM

## 2023-10-18 RX ORDER — FAMOTIDINE 10 MG/ML
20 INJECTION INTRAVENOUS ONCE AS NEEDED
Status: CANCELLED | OUTPATIENT
Start: 2023-11-10

## 2023-10-18 RX ORDER — PROCHLORPERAZINE EDISYLATE 5 MG/ML
10 INJECTION INTRAMUSCULAR; INTRAVENOUS EVERY 6 HOURS PRN
Status: CANCELLED | OUTPATIENT
Start: 2023-11-10

## 2023-10-18 RX ORDER — PROCHLORPERAZINE MALEATE 10 MG
10 TABLET ORAL EVERY 6 HOURS PRN
Status: CANCELLED | OUTPATIENT
Start: 2023-11-10

## 2023-10-18 RX ORDER — ALBUTEROL SULFATE 0.83 MG/ML
3 SOLUTION RESPIRATORY (INHALATION) AS NEEDED
Status: CANCELLED | OUTPATIENT
Start: 2023-11-10

## 2023-10-18 RX ORDER — DIPHENHYDRAMINE HYDROCHLORIDE 50 MG/ML
50 INJECTION INTRAMUSCULAR; INTRAVENOUS AS NEEDED
Status: CANCELLED | OUTPATIENT
Start: 2023-11-10

## 2023-10-18 RX ORDER — EPINEPHRINE 0.3 MG/.3ML
0.3 INJECTION SUBCUTANEOUS EVERY 5 MIN PRN
Status: CANCELLED | OUTPATIENT
Start: 2023-11-10

## 2023-10-18 NOTE — TELEPHONE ENCOUNTER
Pt called back asking if she is able to take Nyquil?  She notes that congestion is making sleep difficult.    She continues to deny fevers, chest pain or difficulty breathing.  She notes that Robitussin is helping loosen cough and she's drinking tea with honey which also seems to be effective during the day.

## 2023-10-20 ENCOUNTER — OFFICE VISIT (OUTPATIENT)
Dept: PRIMARY CARE | Facility: CLINIC | Age: 63
End: 2023-10-20
Payer: MEDICARE

## 2023-10-20 ENCOUNTER — HOSPITAL ENCOUNTER (OUTPATIENT)
Dept: RADIOLOGY | Facility: EXTERNAL LOCATION | Age: 63
Discharge: HOME | End: 2023-10-20

## 2023-10-20 VITALS
BODY MASS INDEX: 24.33 KG/M2 | HEIGHT: 62 IN | HEART RATE: 81 BPM | TEMPERATURE: 97.7 F | OXYGEN SATURATION: 95 % | WEIGHT: 132.2 LBS | SYSTOLIC BLOOD PRESSURE: 124 MMHG | DIASTOLIC BLOOD PRESSURE: 76 MMHG

## 2023-10-20 DIAGNOSIS — J40 BRONCHITIS: Primary | ICD-10-CM

## 2023-10-20 DIAGNOSIS — J40 BRONCHITIS: ICD-10-CM

## 2023-10-20 PROCEDURE — 1159F MED LIST DOCD IN RCRD: CPT | Performed by: NURSE PRACTITIONER

## 2023-10-20 PROCEDURE — 3074F SYST BP LT 130 MM HG: CPT | Performed by: NURSE PRACTITIONER

## 2023-10-20 PROCEDURE — 99213 OFFICE O/P EST LOW 20 MIN: CPT | Performed by: NURSE PRACTITIONER

## 2023-10-20 PROCEDURE — 4004F PT TOBACCO SCREEN RCVD TLK: CPT | Performed by: NURSE PRACTITIONER

## 2023-10-20 PROCEDURE — 3078F DIAST BP <80 MM HG: CPT | Performed by: NURSE PRACTITIONER

## 2023-10-20 PROCEDURE — 1126F AMNT PAIN NOTED NONE PRSNT: CPT | Performed by: NURSE PRACTITIONER

## 2023-10-20 PROCEDURE — 1160F RVW MEDS BY RX/DR IN RCRD: CPT | Performed by: NURSE PRACTITIONER

## 2023-10-20 RX ORDER — HYDROCODONE BITARTRATE AND HOMATROPINE METHYLBROMIDE ORAL SOLUTION 5; 1.5 MG/5ML; MG/5ML
5 LIQUID ORAL NIGHTLY
Qty: 30 ML | Refills: 0 | Status: SHIPPED | OUTPATIENT
Start: 2023-10-20 | End: 2023-10-26

## 2023-10-20 RX ORDER — ALBUTEROL SULFATE 90 UG/1
2 AEROSOL, METERED RESPIRATORY (INHALATION) EVERY 6 HOURS PRN
Qty: 18 G | Refills: 1 | Status: SHIPPED | OUTPATIENT
Start: 2023-10-20 | End: 2023-11-13 | Stop reason: WASHOUT

## 2023-10-20 RX ORDER — DOXYCYCLINE 100 MG/1
100 CAPSULE ORAL 2 TIMES DAILY
Qty: 20 CAPSULE | Refills: 0 | Status: SHIPPED | OUTPATIENT
Start: 2023-10-20 | End: 2023-10-30

## 2023-10-20 ASSESSMENT — ENCOUNTER SYMPTOMS
FEVER: 0
SORE THROAT: 0
EYE REDNESS: 0
PALPITATIONS: 0
ARTHRALGIAS: 0
CHEST TIGHTNESS: 0
EYE DISCHARGE: 0
FATIGUE: 0
SHORTNESS OF BREATH: 0
FACIAL SWELLING: 0
VOMITING: 0
ABDOMINAL PAIN: 0
RHINORRHEA: 0
DIARRHEA: 0
WHEEZING: 0
SINUS PRESSURE: 0
HEADACHES: 0
COUGH: 0
CHILLS: 0
MYALGIAS: 0

## 2023-10-20 NOTE — PATIENT INSTRUCTIONS
Doxycycline  Albuterol inhaler  Cough med rx for night  Hold  on prednisone since immunocompromised  Fluids  Rest  Mucinex no letters-expectorant-guaifenesin for daytime    Do not use xanax when using the cough med    Call if sx worsen or change or not starting to get better in 2-3d--->advair and chest xray    Return for welcome to medicare wellness appt    I will communicate with you via Myer regarding messages and results. If you need help with this, you can call the support line at [phone].    IT WAS A PLEASURE TO SEE YOU TODAY. THANK YOU FOR CHOOSING US FOR YOUR HEALTHCARE NEEDS.

## 2023-10-20 NOTE — PROGRESS NOTES
Subjective   Patient ID: Dora Dueñas is a 63 y.o. female who presents for Cough (Sinus pressure, cough, post nasal drip, no fever).  Last physical: due for wtm appt   Does pt want flu shot? no          HPI  Stuffy nose, runny  nose, cough with phlegm at times, tight upper chest, laryngitis, sinus pressure, post nasal drip, ST , fatigue, ear pressure x 1wk  No covid test done  no sob, wheezing, fever, chills, muscle/jt pain,  new loss of taste or smell, diarrhea, vomiting, nausea, abdominal pain, weakness, red eye, rash, bruising, cyanosis, ear pain, runny nose, pain with deep breath, leg or foot swelling, calf pain  loss of appetite-yes  fluids-yes  has urinated at least 3 times in 24hrs  Seasonal allergies-none  Selftxt-robitussin, nyquil, tylenol,fluids, hot tea  Can't sleep due to cough    No known exposure to COVID-19  No known exposure to strep  No known exposure to influenza  No one sick around the pt    Finished 4 rounds of chemo oct 1st    I have personally reviewed the [facility] report for this patient. I have considered the risks of abuse, dependence, addiction and diversion. No concerns.        Risk factors:  Chronic disease/comorbidities: smoker, htn, lung ca  not a healthcare worker  Age: 65yrs of age and older      No care team member to display     Review of Systems   Constitutional:  Negative for chills, fatigue and fever.   HENT:  Negative for congestion, ear discharge, ear pain, facial swelling, postnasal drip, rhinorrhea, sinus pressure and sore throat.    Eyes:  Negative for discharge and redness.   Respiratory:  Negative for cough, chest tightness, shortness of breath and wheezing.    Cardiovascular:  Negative for chest pain, palpitations and leg swelling.   Gastrointestinal:  Negative for abdominal pain, diarrhea and vomiting.   Musculoskeletal:  Negative for arthralgias and myalgias.   Skin:  Negative for rash.   Neurological:  Negative for headaches.       Objective   Visit Vitals  /73    Pulse 81   Temp 36.5 °C (97.7 °F)      BP Readings from Last 3 Encounters:   10/20/23 145/73   10/09/23 139/79   10/06/23 133/79     Wt Readings from Last 3 Encounters:   10/20/23 60 kg (132 lb 3.2 oz)   10/09/23 58.2 kg (128 lb 3.2 oz)   10/06/23 58.5 kg (128 lb 15.5 oz)       Physical Exam  Constitutional:       Appearance: Normal appearance.   HENT:      Head: Normocephalic.      Right Ear: Tympanic membrane, ear canal and external ear normal.      Left Ear: Tympanic membrane, ear canal and external ear normal.      Nose: Nose normal.      Mouth/Throat:      Mouth: Mucous membranes are moist.      Pharynx: No oropharyngeal exudate or posterior oropharyngeal erythema.   Cardiovascular:      Rate and Rhythm: Normal rate and regular rhythm.      Heart sounds: Normal heart sounds.   Pulmonary:      Effort: Pulmonary effort is normal.      Breath sounds: Normal breath sounds. No wheezing, rhonchi or rales.   Lymphadenopathy:      Cervical: No cervical adenopathy.   Neurological:      Mental Status: She is alert.       Assessment/Plan   Diagnoses and all orders for this visit:  Bronchitis  -     XR chest 2 views; Future  -     doxycycline (Vibramycin) 100 mg capsule; Take 1 capsule (100 mg) by mouth 2 times a day for 10 days. Take with at least 8 ounces (large glass) of water, do not lie down for 30 minutes after  -     albuterol 90 mcg/actuation inhaler; Inhale 2 puffs every 6 hours if needed for wheezing.  -     hydrocodone-homatropine (Hycodan) 5-1.5 mg/5 mL syrup; Take 5 mL by mouth once daily at bedtime for 6 days.  Other orders  -     Follow Up In Primary Care - Health Maintenance; Future

## 2023-11-02 ENCOUNTER — HOSPITAL ENCOUNTER (OUTPATIENT)
Dept: RADIOLOGY | Facility: HOSPITAL | Age: 63
Discharge: HOME | End: 2023-11-02
Payer: MEDICARE

## 2023-11-02 DIAGNOSIS — C34.11 PRIMARY MALIGNANT NEOPLASM OF RIGHT UPPER LOBE OF LUNG (MULTI): ICD-10-CM

## 2023-11-02 PROCEDURE — 71260 CT THORAX DX C+: CPT

## 2023-11-02 PROCEDURE — 2550000001 HC RX 255 CONTRASTS: Performed by: INTERNAL MEDICINE

## 2023-11-02 RX ADMIN — IOHEXOL 75 ML: 350 INJECTION, SOLUTION INTRAVENOUS at 10:18

## 2023-11-10 ENCOUNTER — INFUSION (OUTPATIENT)
Dept: HEMATOLOGY/ONCOLOGY | Facility: CLINIC | Age: 63
End: 2023-11-10
Payer: MEDICARE

## 2023-11-10 ENCOUNTER — OFFICE VISIT (OUTPATIENT)
Dept: HEMATOLOGY/ONCOLOGY | Facility: CLINIC | Age: 63
End: 2023-11-10
Payer: MEDICARE

## 2023-11-10 VITALS
RESPIRATION RATE: 14 BRPM | DIASTOLIC BLOOD PRESSURE: 85 MMHG | SYSTOLIC BLOOD PRESSURE: 182 MMHG | TEMPERATURE: 97.5 F | WEIGHT: 130.95 LBS | BODY MASS INDEX: 23.65 KG/M2 | HEART RATE: 66 BPM | OXYGEN SATURATION: 98 %

## 2023-11-10 DIAGNOSIS — C34.11 PRIMARY MALIGNANT NEOPLASM OF RIGHT UPPER LOBE OF LUNG (MULTI): ICD-10-CM

## 2023-11-10 DIAGNOSIS — C34.11 PRIMARY MALIGNANT NEOPLASM OF RIGHT UPPER LOBE OF LUNG (MULTI): Primary | ICD-10-CM

## 2023-11-10 LAB
ALBUMIN SERPL BCP-MCNC: 4.6 G/DL (ref 3.4–5)
ALP SERPL-CCNC: 80 U/L (ref 33–136)
ALT SERPL W P-5'-P-CCNC: 11 U/L (ref 7–45)
ANION GAP SERPL CALC-SCNC: 15 MMOL/L (ref 10–20)
AST SERPL W P-5'-P-CCNC: 13 U/L (ref 9–39)
BASOPHILS # BLD AUTO: 0.02 X10*3/UL (ref 0–0.1)
BASOPHILS NFR BLD AUTO: 0.3 %
BILIRUB SERPL-MCNC: 0.4 MG/DL (ref 0–1.2)
BUN SERPL-MCNC: 7 MG/DL (ref 6–23)
CALCIUM SERPL-MCNC: 9.5 MG/DL (ref 8.6–10.3)
CHLORIDE SERPL-SCNC: 97 MMOL/L (ref 98–107)
CO2 SERPL-SCNC: 26 MMOL/L (ref 21–32)
CORTIS AM PEAK SERPL-MSCNC: 8.3 UG/DL (ref 5–20)
CREAT SERPL-MCNC: 0.69 MG/DL (ref 0.5–1.05)
EOSINOPHIL # BLD AUTO: 0.05 X10*3/UL (ref 0–0.7)
EOSINOPHIL NFR BLD AUTO: 0.7 %
ERYTHROCYTE [DISTWIDTH] IN BLOOD BY AUTOMATED COUNT: 13.5 % (ref 11.5–14.5)
GFR SERPL CREATININE-BSD FRML MDRD: >90 ML/MIN/1.73M*2
GLUCOSE SERPL-MCNC: 169 MG/DL (ref 74–99)
HBV CORE AB SER QL: NONREACTIVE
HBV SURFACE AB SER-ACNC: <3.1 MIU/ML
HBV SURFACE AG SERPL QL IA: NONREACTIVE
HCT VFR BLD AUTO: 36.7 % (ref 36–46)
HGB BLD-MCNC: 12.4 G/DL (ref 12–16)
IMM GRANULOCYTES # BLD AUTO: 0.05 X10*3/UL (ref 0–0.7)
IMM GRANULOCYTES NFR BLD AUTO: 0.7 % (ref 0–0.9)
LYMPHOCYTES # BLD AUTO: 2.32 X10*3/UL (ref 1.2–4.8)
LYMPHOCYTES NFR BLD AUTO: 30.3 %
MCH RBC QN AUTO: 31.8 PG (ref 26–34)
MCHC RBC AUTO-ENTMCNC: 33.8 G/DL (ref 32–36)
MCV RBC AUTO: 94 FL (ref 80–100)
MONOCYTES # BLD AUTO: 0.51 X10*3/UL (ref 0.1–1)
MONOCYTES NFR BLD AUTO: 6.7 %
NEUTROPHILS # BLD AUTO: 4.7 X10*3/UL (ref 1.2–7.7)
NEUTROPHILS NFR BLD AUTO: 61.3 %
NRBC BLD-RTO: ABNORMAL /100{WBCS}
PLATELET # BLD AUTO: 244 X10*3/UL (ref 150–450)
POTASSIUM SERPL-SCNC: 4.3 MMOL/L (ref 3.5–5.3)
PROT SERPL-MCNC: 7.9 G/DL (ref 6.4–8.2)
RBC # BLD AUTO: 3.9 X10*6/UL (ref 4–5.2)
SODIUM SERPL-SCNC: 134 MMOL/L (ref 136–145)
TSH SERPL-ACNC: 2.87 MIU/L (ref 0.44–3.98)
WBC # BLD AUTO: 7.7 X10*3/UL (ref 4.4–11.3)

## 2023-11-10 PROCEDURE — 82024 ASSAY OF ACTH: CPT

## 2023-11-10 PROCEDURE — 87340 HEPATITIS B SURFACE AG IA: CPT | Performed by: INTERNAL MEDICINE

## 2023-11-10 PROCEDURE — 99214 OFFICE O/P EST MOD 30 MIN: CPT | Performed by: INTERNAL MEDICINE

## 2023-11-10 PROCEDURE — 84443 ASSAY THYROID STIM HORMONE: CPT | Performed by: INTERNAL MEDICINE

## 2023-11-10 PROCEDURE — 36415 COLL VENOUS BLD VENIPUNCTURE: CPT

## 2023-11-10 PROCEDURE — 3077F SYST BP >= 140 MM HG: CPT | Performed by: INTERNAL MEDICINE

## 2023-11-10 PROCEDURE — 1160F RVW MEDS BY RX/DR IN RCRD: CPT | Performed by: INTERNAL MEDICINE

## 2023-11-10 PROCEDURE — 96413 CHEMO IV INFUSION 1 HR: CPT

## 2023-11-10 PROCEDURE — 80053 COMPREHEN METABOLIC PANEL: CPT | Performed by: INTERNAL MEDICINE

## 2023-11-10 PROCEDURE — 3079F DIAST BP 80-89 MM HG: CPT | Performed by: INTERNAL MEDICINE

## 2023-11-10 PROCEDURE — 2500000004 HC RX 250 GENERAL PHARMACY W/ HCPCS (ALT 636 FOR OP/ED): Mod: JZ,JG,SE | Performed by: INTERNAL MEDICINE

## 2023-11-10 PROCEDURE — 82533 TOTAL CORTISOL: CPT | Performed by: INTERNAL MEDICINE

## 2023-11-10 PROCEDURE — 4004F PT TOBACCO SCREEN RCVD TLK: CPT | Performed by: INTERNAL MEDICINE

## 2023-11-10 PROCEDURE — 86706 HEP B SURFACE ANTIBODY: CPT | Performed by: INTERNAL MEDICINE

## 2023-11-10 PROCEDURE — 85025 COMPLETE CBC W/AUTO DIFF WBC: CPT | Performed by: INTERNAL MEDICINE

## 2023-11-10 PROCEDURE — 1159F MED LIST DOCD IN RCRD: CPT | Performed by: INTERNAL MEDICINE

## 2023-11-10 PROCEDURE — 86704 HEP B CORE ANTIBODY TOTAL: CPT | Performed by: INTERNAL MEDICINE

## 2023-11-10 PROCEDURE — 1126F AMNT PAIN NOTED NONE PRSNT: CPT | Performed by: INTERNAL MEDICINE

## 2023-11-10 RX ORDER — ALBUTEROL SULFATE 0.83 MG/ML
3 SOLUTION RESPIRATORY (INHALATION) AS NEEDED
Status: CANCELLED | OUTPATIENT
Start: 2023-12-01

## 2023-11-10 RX ORDER — DIPHENHYDRAMINE HYDROCHLORIDE 50 MG/ML
50 INJECTION INTRAMUSCULAR; INTRAVENOUS AS NEEDED
Status: DISCONTINUED | OUTPATIENT
Start: 2023-11-10 | End: 2023-11-10 | Stop reason: HOSPADM

## 2023-11-10 RX ORDER — ALBUTEROL SULFATE 0.83 MG/ML
3 SOLUTION RESPIRATORY (INHALATION) AS NEEDED
Status: DISCONTINUED | OUTPATIENT
Start: 2023-11-10 | End: 2023-11-10 | Stop reason: HOSPADM

## 2023-11-10 RX ORDER — PROCHLORPERAZINE MALEATE 5 MG
10 TABLET ORAL EVERY 6 HOURS PRN
Status: CANCELLED | OUTPATIENT
Start: 2023-12-01

## 2023-11-10 RX ORDER — DIPHENHYDRAMINE HYDROCHLORIDE 50 MG/ML
50 INJECTION INTRAMUSCULAR; INTRAVENOUS AS NEEDED
Status: CANCELLED | OUTPATIENT
Start: 2023-12-01

## 2023-11-10 RX ORDER — EPINEPHRINE 0.3 MG/.3ML
0.3 INJECTION SUBCUTANEOUS EVERY 5 MIN PRN
Status: CANCELLED | OUTPATIENT
Start: 2023-12-01

## 2023-11-10 RX ORDER — FAMOTIDINE 10 MG/ML
20 INJECTION INTRAVENOUS ONCE AS NEEDED
Status: CANCELLED | OUTPATIENT
Start: 2023-12-01

## 2023-11-10 RX ORDER — PROCHLORPERAZINE EDISYLATE 5 MG/ML
10 INJECTION INTRAMUSCULAR; INTRAVENOUS EVERY 6 HOURS PRN
Status: CANCELLED | OUTPATIENT
Start: 2023-12-01

## 2023-11-10 RX ORDER — PROCHLORPERAZINE MALEATE 10 MG
10 TABLET ORAL EVERY 6 HOURS PRN
Status: DISCONTINUED | OUTPATIENT
Start: 2023-11-10 | End: 2023-11-10 | Stop reason: HOSPADM

## 2023-11-10 RX ORDER — FAMOTIDINE 10 MG/ML
20 INJECTION INTRAVENOUS ONCE AS NEEDED
Status: DISCONTINUED | OUTPATIENT
Start: 2023-11-10 | End: 2023-11-10 | Stop reason: HOSPADM

## 2023-11-10 RX ORDER — EPINEPHRINE 0.3 MG/.3ML
0.3 INJECTION SUBCUTANEOUS EVERY 5 MIN PRN
Status: DISCONTINUED | OUTPATIENT
Start: 2023-11-10 | End: 2023-11-10 | Stop reason: HOSPADM

## 2023-11-10 RX ORDER — HEPARIN 100 UNIT/ML
500 SYRINGE INTRAVENOUS AS NEEDED
Status: CANCELLED | OUTPATIENT
Start: 2023-11-10

## 2023-11-10 RX ORDER — PROCHLORPERAZINE EDISYLATE 5 MG/ML
10 INJECTION INTRAMUSCULAR; INTRAVENOUS EVERY 6 HOURS PRN
Status: DISCONTINUED | OUTPATIENT
Start: 2023-11-10 | End: 2023-11-10 | Stop reason: HOSPADM

## 2023-11-10 RX ORDER — HEPARIN SODIUM,PORCINE/PF 10 UNIT/ML
50 SYRINGE (ML) INTRAVENOUS AS NEEDED
Status: CANCELLED | OUTPATIENT
Start: 2023-11-10

## 2023-11-10 RX ADMIN — ATEZOLIZUMAB 1200 MG: 1200 INJECTION, SOLUTION INTRAVENOUS at 11:54

## 2023-11-10 ASSESSMENT — PATIENT HEALTH QUESTIONNAIRE - PHQ9
9. THOUGHTS THAT YOU WOULD BE BETTER OFF DEAD, OR OF HURTING YOURSELF: NOT AT ALL
1. LITTLE INTEREST OR PLEASURE IN DOING THINGS: 0
2. FEELING DOWN, DEPRESSED OR HOPELESS: NOT AT ALL
5. POOR APPETITE OR OVEREATING: NOT AT ALL
3. TROUBLE FALLING OR STAYING ASLEEP OR SLEEPING TOO MUCH: 0
2. FEELING DOWN, DEPRESSED OR HOPELESS: NOT AT ALL
5. POOR APPETITE OR OVEREATING: NOT AT ALL
4. FEELING TIRED OR HAVING LITTLE ENERGY: NOT AT ALL
1. LITTLE INTEREST OR PLEASURE IN DOING THINGS: NOT AT ALL
4. FEELING TIRED OR HAVING LITTLE ENERGY: 0
6. FEELING BAD ABOUT YOURSELF - OR THAT YOU ARE A FAILURE OR HAVE LET YOURSELF OR YOUR FAMILY DOWN: NOT AT ALL
SUM OF ALL RESPONSES TO PHQ QUESTIONS 1-9: 0
8. MOVING OR SPEAKING SO SLOWLY THAT OTHER PEOPLE COULD HAVE NOTICED. OR THE OPPOSITE, BEING SO FIGETY OR RESTLESS THAT YOU HAVE BEEN MOVING AROUND A LOT MORE THAN USUAL: NOT AT ALL
5. POOR APPETITE OR OVEREATING: 0
1. LITTLE INTEREST OR PLEASURE IN DOING THINGS: 0
8. MOVING OR SPEAKING SO SLOWLY THAT OTHER PEOPLE COULD HAVE NOTICED. OR THE OPPOSITE, BEING SO FIGETY OR RESTLESS THAT YOU HAVE BEEN MOVING AROUND A LOT MORE THAN USUAL: NOT AT ALL
3. TROUBLE FALLING OR STAYING ASLEEP OR SLEEPING TOO MUCH: NOT AT ALL
8. MOVING OR SPEAKING SO SLOWLY THAT OTHER PEOPLE COULD HAVE NOTICED. OR THE OPPOSITE, BEING SO FIGETY OR RESTLESS THAT YOU HAVE BEEN MOVING AROUND A LOT MORE THAN USUAL: NOT AT ALL
5. POOR APPETITE OR OVEREATING: NOT AT ALL
9. THOUGHTS THAT YOU WOULD BE BETTER OFF DEAD, OR OF HURTING YOURSELF: NOT AT ALL
6. FEELING BAD ABOUT YOURSELF - OR THAT YOU ARE A FAILURE OR HAVE LET YOURSELF OR YOUR FAMILY DOWN: NOT AT ALL
4. FEELING TIRED OR HAVING LITTLE ENERGY: NOT AT ALL
1. LITTLE INTEREST OR PLEASURE IN DOING THINGS: NOT AT ALL
1. LITTLE INTEREST OR PLEASURE IN DOING THINGS: NOT AT ALL
8. MOVING OR SPEAKING SO SLOWLY THAT OTHER PEOPLE COULD HAVE NOTICED. OR THE OPPOSITE, BEING SO FIGETY OR RESTLESS THAT YOU HAVE BEEN MOVING AROUND A LOT MORE THAN USUAL: NOT AT ALL
8. MOVING OR SPEAKING SO SLOWLY THAT OTHER PEOPLE COULD HAVE NOTICED. OR THE OPPOSITE, BEING SO FIGETY OR RESTLESS THAT YOU HAVE BEEN MOVING AROUND A LOT MORE THAN USUAL: 0
6. FEELING BAD ABOUT YOURSELF - OR THAT YOU ARE A FAILURE OR HAVE LET YOURSELF OR YOUR FAMILY DOWN: 0
4. FEELING TIRED OR HAVING LITTLE ENERGY: NOT AT ALL
5. POOR APPETITE OR OVEREATING: 0
2. FEELING DOWN, DEPRESSED, IRRITABLE, OR HOPELESS: NOT AT ALL
8. MOVING OR SPEAKING SO SLOWLY THAT OTHER PEOPLE COULD HAVE NOTICED. OR THE OPPOSITE, BEING SO FIGETY OR RESTLESS THAT YOU HAVE BEEN MOVING AROUND A LOT MORE THAN USUAL: 0
7. TROUBLE CONCENTRATING ON THINGS, SUCH AS READING THE NEWSPAPER OR WATCHING TELEVISION: 0
SUM OF ALL RESPONSES TO PHQ QUESTIONS 1-9: 0
6. FEELING BAD ABOUT YOURSELF - OR THAT YOU ARE A FAILURE OR HAVE LET YOURSELF OR YOUR FAMILY DOWN: NOT AT ALL
7. TROUBLE CONCENTRATING ON THINGS, SUCH AS READING THE NEWSPAPER OR WATCHING TELEVISION: NOT AT ALL
7. TROUBLE CONCENTRATING ON THINGS, SUCH AS READING THE NEWSPAPER OR WATCHING TELEVISION: NOT AT ALL
3. TROUBLE FALLING OR STAYING ASLEEP OR SLEEPING TOO MUCH: 0
9. THOUGHTS THAT YOU WOULD BE BETTER OFF DEAD, OR OF HURTING YOURSELF: NOT AT ALL
5. POOR APPETITE OR OVEREATING: NOT AT ALL
1. LITTLE INTEREST OR PLEASURE IN DOING THINGS: NOT AT ALL
9. THOUGHTS THAT YOU WOULD BE BETTER OFF DEAD, OR OF HURTING YOURSELF: NOT AT ALL
7. TROUBLE CONCENTRATING ON THINGS, SUCH AS READING THE NEWSPAPER OR WATCHING TELEVISION: 0
3. TROUBLE FALLING OR STAYING ASLEEP OR SLEEPING TOO MUCH: NOT AT ALL
2. FEELING DOWN, DEPRESSED, IRRITABLE, OR HOPELESS: NOT AT ALL
6. FEELING BAD ABOUT YOURSELF - OR THAT YOU ARE A FAILURE OR HAVE LET YOURSELF OR YOUR FAMILY DOWN: NOT AT ALL
9. THOUGHTS THAT YOU WOULD BE BETTER OFF DEAD, OR OF HURTING YOURSELF: 0
3. TROUBLE FALLING OR STAYING ASLEEP OR SLEEPING TOO MUCH: NOT AT ALL
4. FEELING TIRED OR HAVING LITTLE ENERGY: NOT AT ALL
7. TROUBLE CONCENTRATING ON THINGS, SUCH AS READING THE NEWSPAPER OR WATCHING TELEVISION: NOT AT ALL
SUM OF ALL RESPONSES TO PHQ QUESTIONS 1-9: 0
SUM OF ALL RESPONSES TO PHQ QUESTIONS 1-9: 0
4. FEELING TIRED OR HAVING LITTLE ENERGY: 0
3. TROUBLE FALLING OR STAYING ASLEEP OR SLEEPING TOO MUCH: NOT AT ALL
9. THOUGHTS THAT YOU WOULD BE BETTER OFF DEAD, OR OF HURTING YOURSELF: 0
2. FEELING DOWN, DEPRESSED, IRRITABLE, OR HOPELESS: 0
2. FEELING DOWN, DEPRESSED, IRRITABLE, OR HOPELESS: 0
6. FEELING BAD ABOUT YOURSELF - OR THAT YOU ARE A FAILURE OR HAVE LET YOURSELF OR YOUR FAMILY DOWN: 0
7. TROUBLE CONCENTRATING ON THINGS, SUCH AS READING THE NEWSPAPER OR WATCHING TELEVISION: NOT AT ALL

## 2023-11-10 ASSESSMENT — ENCOUNTER SYMPTOMS
OCCASIONAL FEELINGS OF UNSTEADINESS: 0
DEPRESSION: 0
LOSS OF SENSATION IN FEET: 0

## 2023-11-10 ASSESSMENT — PAIN SCALES - GENERAL: PAINLEVEL: 0-NO PAIN

## 2023-11-12 LAB — ACTH PLAS-MCNC: 12.7 PG/ML (ref 7.2–63.3)

## 2023-11-13 ASSESSMENT — ENCOUNTER SYMPTOMS
ABDOMINAL PAIN: 0
DIARRHEA: 0
CONSTIPATION: 0
COUGH: 0
DIFFICULTY URINATING: 0
BACK PAIN: 0
VOMITING: 0
EYE PROBLEMS: 0
SHORTNESS OF BREATH: 0
DIZZINESS: 0
FATIGUE: 1
ADENOPATHY: 0
LEG SWELLING: 0
NAUSEA: 0
HEADACHES: 0
ARTHRALGIAS: 0
APPETITE CHANGE: 0
UNEXPECTED WEIGHT CHANGE: 0

## 2023-11-13 NOTE — PROGRESS NOTES
[facility] - Medical Oncology Follow-Up Visit    Patient ID: Dora Dueñas is a 63 y.o. female with stage IIb lung adenocarcinoma s/p RUL segmentectomy, with TP53 mutation and PD-L1 60%.     Current therapy: Planning adjuvant atezolizumab     Chief Concern: Here for followup and treatment readiness visit    HPI: Patient reports overall doing well, recovered from her URI (had cough/post nasal drip for some time, daughter was also sick). Worried about starting immunotherapy and possible side effects though also worried about not doing and regretting it.    Diagnosis: Lung adenocarcinoma  Stage:  Cancer Staging   Primary malignant neoplasm of right upper lobe of lung (CMS/HCC)  Staging form: Lung, AJCC 8th Edition  - Pathologic: Stage IIB (pT1c, pN1, cM0) - Signed by Keena Roy MD on 9/25/2023     Current sites of disease: JULITO s/p RUL segmentectomy  Molecular and ancillary testing:   NGS with TP53 mutation  PD-L1 60%    Oncologic History:  1/11/23 - cardiac CT with RUL nodule  2/14/23 - CT chest with RUL nodule  3/17/23 - PET with FDG-avid RUL nodule no suspicious LNs  6/5/23 - RUL segmentectomy and LN dissection with pT1cN1 adenocarcinoma; 1 peribronchial LN positive for malignancy  8/7/23 -10/9/23 - adjuvant cisplatin/pemetrexed (completed 4 cycles)  11/10/23 - C1 adjuvant atezolizumab    Oncology History   Primary malignant neoplasm of right upper lobe of lung (CMS/HCC)   6/12/2023 Initial Diagnosis    Primary malignant neoplasm of right upper lobe of lung (CMS/HCC)     8/7/2023 - 10/9/2023 Chemotherapy    PEMEtrexed / CISplatin, 21 Day Cycles - Thoracic      9/25/2023 Cancer Staged    Staging form: Lung, AJCC 8th Edition, Pathologic: Stage IIB (pT1c, pN1, cM0) - Signed by Keena Roy MD on 9/25/2023     11/10/2023 -  Chemotherapy    Atezolizumab, 21 Day Cycles       Past Medical/Surgical Hx:   HTN  HLD  osteoarthritis  osteoporosis     Social Hx:   +tobacco use 1ppd since  age 23, has quit at times in the past including recently prior to surgery, recently started again. Laser therapy helped for 3m  Lives at home with adult son whom she cares for (has autism) and her adult daughter who is a big support   Lots of friends and community who support her as well     Family Hx:   Paternal grandfather had lung cancer - was a smoker    Meds (Current):    Current Outpatient Medications:     albuterol 90 mcg/actuation inhaler, Inhale 2 puffs every 6 hours if needed for wheezing., Disp: 18 g, Rfl: 1    ALPRAZolam (Xanax) 0.5 mg tablet, Take 1 tablet (0.5 mg) by mouth if needed., Disp: , Rfl:     buPROPion XL (Wellbutrin XL) 150 mg 24 hr tablet, TAKE ONE TABLET BY MOUTH IN THE MORNING AS DIRECTED, Disp: , Rfl:     cholecalciferol (Vitamin D-3) 25 MCG (1000 UT) capsule, Take 1 capsule (25 mcg) by mouth once daily., Disp: , Rfl:     escitalopram (Lexapro) 20 mg tablet, TAKE TWO TABLETS BY MOUTH DAILY IN THE MORNING AS DIRECTED., Disp: , Rfl:     folic acid (Folvite) 1 mg tablet, Take 1 tablet (1 mg) by mouth once daily., Disp: , Rfl:     losartan (Cozaar) 100 mg tablet, TAKE ONE TABLET BY MOUTH EVERY DAY, Disp: 90 tablet, Rfl: 0    Review of Systems   Constitutional:  Positive for fatigue. Negative for appetite change and unexpected weight change.   HENT:   Negative for hearing loss.    Eyes:  Negative for eye problems.   Respiratory:  Negative for cough and shortness of breath.    Cardiovascular:  Negative for chest pain and leg swelling.   Gastrointestinal:  Negative for abdominal pain, constipation, diarrhea, nausea and vomiting.   Genitourinary:  Negative for difficulty urinating.    Musculoskeletal:  Negative for arthralgias and back pain.   Skin:  Negative for rash.   Neurological:  Negative for dizziness and headaches.   Hematological:  Negative for adenopathy.        Objective   BSA: 1.62 meters squared  BP (!) 182/85 (BP Location: Left arm, Patient Position: Sitting)   Pulse 66   Temp  36.4 °C (97.5 °F) (Temporal)   Resp 14   Wt 59.4 kg (130 lb 15.3 oz)   SpO2 98%   BMI 23.65 kg/m²   Performance Status:  Asymptomatic - ECOG 0     Physical Exam  Vitals and nursing note reviewed.   Constitutional:       General: She is not in acute distress.  HENT:      Head: Normocephalic and atraumatic.   Eyes:      Pupils: Pupils are equal, round, and reactive to light.   Cardiovascular:      Rate and Rhythm: Normal rate and regular rhythm.      Heart sounds: Normal heart sounds.   Pulmonary:      Effort: Pulmonary effort is normal.      Breath sounds: Normal breath sounds.   Abdominal:      General: There is no distension.   Musculoskeletal:         General: No swelling.   Skin:     General: Skin is warm and dry.      Findings: No rash.   Neurological:      General: No focal deficit present.      Mental Status: She is alert and oriented to person, place, and time.   Psychiatric:         Mood and Affect: Mood normal.         Behavior: Behavior normal.        Results:  Labs:  Lab Results   Component Value Date    WBC 7.7 11/10/2023    HGB 12.4 11/10/2023    HCT 36.7 11/10/2023    MCV 94 11/10/2023     11/10/2023      Lab Results   Component Value Date    NEUTROABS 4.70 11/10/2023      Lab Results   Component Value Date    GLUCOSE 169 (H) 11/10/2023    CALCIUM 9.5 11/10/2023     (L) 11/10/2023    K 4.3 11/10/2023    CO2 26 11/10/2023    CL 97 (L) 11/10/2023    BUN 7 11/10/2023    CREATININE 0.69 11/10/2023    MG 1.66 10/09/2023     Lab Results   Component Value Date    ALT 11 11/10/2023    AST 13 11/10/2023    ALKPHOS 80 11/10/2023    BILITOT 0.4 11/10/2023      Imaging:  I personally reviewed the below imaging and concur with the findings as reported unless otherwise stated    === Results for orders placed during the hospital encounter of 11/02/23 ===    CT chest w IV contrast [[id number]] 11/02/2023    Status: Normal  1.  Interval wedge resection of the mass of the right upper lobe with  scarring  noted  2. No new mass is identified    MACRO:  None    Signed by: Madison Redmond 11/3/2023 9:32 AM  Dictation workstation:   [id number]      Pathology:  No new pathology    Assessment/Plan      Dora Dueñas is a 63 y.o. female here for follow up of stage IIb lung adenocarcinoma with PD-L1 60% and no actionable mutations on NGS, s/p adjuvant chemotherapy after RUL segmentectomy.    She tolerated adjuvant chemotherapy well with progressive fatigue after the 4th cycle. Here today to start adjuvant immunotherapy. Discussed logistics and side effects of atezolizumab including but not limited to risk of immune-related AEs - most commonly thyroiditis, rash, colitis, fatigue but also rare risk of pneumonitis, myocarditis, nephritis, hepatitis, and other endocrine or neurologic AEs.     #Stage IIb lung adenocarcinoma -   - Proceed with adjuvant atezolizumab today, plan up to 1 year of treatment (17 doses)  - Consent signed today  - Plan CT Chest with IV contrast q6m for now    #Mucositis - due to chemotherapy  - resolved     # Hypokalemia  - Improved, likely from chemo, can stop and monitor     # Hyponatremia (chronic) - at baseline  - Pt asymptomatic  - Will continue to monitor     RTC in 3 weeks for C2 atezolizumab, patient encouraged to call if any issues prior to follow-up.    Keena Roy MD  [facility]

## 2023-11-28 ENCOUNTER — SOCIAL WORK (OUTPATIENT)
Dept: CASE MANAGEMENT | Facility: HOSPITAL | Age: 63
End: 2023-11-28
Payer: MEDICAID

## 2023-11-28 NOTE — PROGRESS NOTES
Social Work Note  11/28/2023 SW contacted patient again (vm left 11/17/2023) regarding Dr. Roy not being listed as a provider under the insurance she is looking at for 2024.  We discussed this and SW looked at the provider list  for Lu Villarreal.  None of the physicians that are new to [place] and [facility] are listed.  JOEL called patient back and left a vm for her to contact her  and update them with this information.   is telling her it is by MD name not hospital system.    SW will remain available to assist patient.  Brittany Cash, MSW, LSW

## 2023-11-30 ENCOUNTER — LAB (OUTPATIENT)
Dept: LAB | Facility: LAB | Age: 63
End: 2023-11-30
Payer: MEDICARE

## 2023-11-30 DIAGNOSIS — C34.11 PRIMARY MALIGNANT NEOPLASM OF RIGHT UPPER LOBE OF LUNG (MULTI): ICD-10-CM

## 2023-11-30 LAB
ALBUMIN SERPL BCP-MCNC: 4.6 G/DL (ref 3.4–5)
ALP SERPL-CCNC: 81 U/L (ref 33–136)
ALT SERPL W P-5'-P-CCNC: 30 U/L (ref 7–45)
ANION GAP SERPL CALC-SCNC: 16 MMOL/L (ref 10–20)
AST SERPL W P-5'-P-CCNC: 27 U/L (ref 9–39)
BASOPHILS # BLD AUTO: 0.09 X10*3/UL (ref 0–0.1)
BASOPHILS NFR BLD AUTO: 1.3 %
BILIRUB SERPL-MCNC: 0.7 MG/DL (ref 0–1.2)
BUN SERPL-MCNC: 11 MG/DL (ref 6–23)
CALCIUM SERPL-MCNC: 9.6 MG/DL (ref 8.6–10.6)
CHLORIDE SERPL-SCNC: 100 MMOL/L (ref 98–107)
CO2 SERPL-SCNC: 24 MMOL/L (ref 21–32)
CREAT SERPL-MCNC: 0.67 MG/DL (ref 0.5–1.05)
EOSINOPHIL # BLD AUTO: 0.17 X10*3/UL (ref 0–0.7)
EOSINOPHIL NFR BLD AUTO: 2.4 %
ERYTHROCYTE [DISTWIDTH] IN BLOOD BY AUTOMATED COUNT: 14.2 % (ref 11.5–14.5)
GFR SERPL CREATININE-BSD FRML MDRD: >90 ML/MIN/1.73M*2
GLUCOSE SERPL-MCNC: 98 MG/DL (ref 74–99)
HCT VFR BLD AUTO: 36 % (ref 36–46)
HGB BLD-MCNC: 11.7 G/DL (ref 12–16)
IMM GRANULOCYTES # BLD AUTO: 0.03 X10*3/UL (ref 0–0.7)
IMM GRANULOCYTES NFR BLD AUTO: 0.4 % (ref 0–0.9)
LYMPHOCYTES # BLD AUTO: 2.46 X10*3/UL (ref 1.2–4.8)
LYMPHOCYTES NFR BLD AUTO: 34.9 %
MCH RBC QN AUTO: 31.5 PG (ref 26–34)
MCHC RBC AUTO-ENTMCNC: 32.5 G/DL (ref 32–36)
MCV RBC AUTO: 97 FL (ref 80–100)
MONOCYTES # BLD AUTO: 0.67 X10*3/UL (ref 0.1–1)
MONOCYTES NFR BLD AUTO: 9.5 %
NEUTROPHILS # BLD AUTO: 3.62 X10*3/UL (ref 1.2–7.7)
NEUTROPHILS NFR BLD AUTO: 51.5 %
NRBC BLD-RTO: 0 /100 WBCS (ref 0–0)
PLATELET # BLD AUTO: 201 X10*3/UL (ref 150–450)
POTASSIUM SERPL-SCNC: 4 MMOL/L (ref 3.5–5.3)
PROT SERPL-MCNC: 7 G/DL (ref 6.4–8.2)
RBC # BLD AUTO: 3.71 X10*6/UL (ref 4–5.2)
SODIUM SERPL-SCNC: 136 MMOL/L (ref 136–145)
WBC # BLD AUTO: 7 X10*3/UL (ref 4.4–11.3)

## 2023-11-30 PROCEDURE — 80053 COMPREHEN METABOLIC PANEL: CPT

## 2023-11-30 PROCEDURE — 85025 COMPLETE CBC W/AUTO DIFF WBC: CPT

## 2023-11-30 PROCEDURE — 36415 COLL VENOUS BLD VENIPUNCTURE: CPT

## 2023-12-01 ENCOUNTER — OFFICE VISIT (OUTPATIENT)
Dept: HEMATOLOGY/ONCOLOGY | Facility: CLINIC | Age: 63
End: 2023-12-01
Payer: MEDICARE

## 2023-12-01 ENCOUNTER — INFUSION (OUTPATIENT)
Dept: HEMATOLOGY/ONCOLOGY | Facility: CLINIC | Age: 63
End: 2023-12-01
Payer: MEDICARE

## 2023-12-01 VITALS
WEIGHT: 132.72 LBS | SYSTOLIC BLOOD PRESSURE: 128 MMHG | RESPIRATION RATE: 14 BRPM | BODY MASS INDEX: 23.96 KG/M2 | DIASTOLIC BLOOD PRESSURE: 71 MMHG | HEART RATE: 76 BPM | OXYGEN SATURATION: 97 % | TEMPERATURE: 97.9 F

## 2023-12-01 DIAGNOSIS — C34.11 PRIMARY MALIGNANT NEOPLASM OF RIGHT UPPER LOBE OF LUNG (MULTI): ICD-10-CM

## 2023-12-01 DIAGNOSIS — Z51.12 ENCOUNTER FOR ANTINEOPLASTIC IMMUNOTHERAPY: ICD-10-CM

## 2023-12-01 DIAGNOSIS — C34.11 PRIMARY MALIGNANT NEOPLASM OF RIGHT UPPER LOBE OF LUNG (MULTI): Primary | ICD-10-CM

## 2023-12-01 PROCEDURE — 96413 CHEMO IV INFUSION 1 HR: CPT

## 2023-12-01 PROCEDURE — 99214 OFFICE O/P EST MOD 30 MIN: CPT | Performed by: NURSE PRACTITIONER

## 2023-12-01 PROCEDURE — 3078F DIAST BP <80 MM HG: CPT | Performed by: NURSE PRACTITIONER

## 2023-12-01 PROCEDURE — 1159F MED LIST DOCD IN RCRD: CPT | Performed by: NURSE PRACTITIONER

## 2023-12-01 PROCEDURE — 4004F PT TOBACCO SCREEN RCVD TLK: CPT | Performed by: NURSE PRACTITIONER

## 2023-12-01 PROCEDURE — 96365 THER/PROPH/DIAG IV INF INIT: CPT | Mod: INF

## 2023-12-01 PROCEDURE — 1160F RVW MEDS BY RX/DR IN RCRD: CPT | Performed by: NURSE PRACTITIONER

## 2023-12-01 PROCEDURE — 3074F SYST BP LT 130 MM HG: CPT | Performed by: NURSE PRACTITIONER

## 2023-12-01 PROCEDURE — 1126F AMNT PAIN NOTED NONE PRSNT: CPT | Performed by: NURSE PRACTITIONER

## 2023-12-01 PROCEDURE — 2500000004 HC RX 250 GENERAL PHARMACY W/ HCPCS (ALT 636 FOR OP/ED): Mod: JZ,JG,SE | Performed by: INTERNAL MEDICINE

## 2023-12-01 RX ORDER — ATORVASTATIN CALCIUM 40 MG/1
40 TABLET, FILM COATED ORAL DAILY
COMMUNITY
End: 2024-04-17 | Stop reason: SDUPTHER

## 2023-12-01 RX ORDER — FAMOTIDINE 10 MG/ML
20 INJECTION INTRAVENOUS ONCE AS NEEDED
Status: DISCONTINUED | OUTPATIENT
Start: 2023-12-01 | End: 2023-12-01 | Stop reason: HOSPADM

## 2023-12-01 RX ORDER — EPINEPHRINE 0.3 MG/.3ML
0.3 INJECTION SUBCUTANEOUS EVERY 5 MIN PRN
Status: DISCONTINUED | OUTPATIENT
Start: 2023-12-01 | End: 2023-12-01 | Stop reason: HOSPADM

## 2023-12-01 RX ORDER — PROCHLORPERAZINE EDISYLATE 5 MG/ML
10 INJECTION INTRAMUSCULAR; INTRAVENOUS EVERY 6 HOURS PRN
Status: DISCONTINUED | OUTPATIENT
Start: 2023-12-01 | End: 2023-12-01 | Stop reason: HOSPADM

## 2023-12-01 RX ORDER — DIPHENHYDRAMINE HYDROCHLORIDE 50 MG/ML
50 INJECTION INTRAMUSCULAR; INTRAVENOUS AS NEEDED
Status: DISCONTINUED | OUTPATIENT
Start: 2023-12-01 | End: 2023-12-01 | Stop reason: HOSPADM

## 2023-12-01 RX ORDER — PROCHLORPERAZINE MALEATE 10 MG
10 TABLET ORAL EVERY 6 HOURS PRN
Status: DISCONTINUED | OUTPATIENT
Start: 2023-12-01 | End: 2023-12-01 | Stop reason: HOSPADM

## 2023-12-01 RX ORDER — ALBUTEROL SULFATE 0.83 MG/ML
3 SOLUTION RESPIRATORY (INHALATION) AS NEEDED
Status: DISCONTINUED | OUTPATIENT
Start: 2023-12-01 | End: 2023-12-01 | Stop reason: HOSPADM

## 2023-12-01 RX ADMIN — ATEZOLIZUMAB 1200 MG: 1200 INJECTION, SOLUTION INTRAVENOUS at 12:01

## 2023-12-01 ASSESSMENT — ENCOUNTER SYMPTOMS
OCCASIONAL FEELINGS OF UNSTEADINESS: 0
DEPRESSION: 0
LOSS OF SENSATION IN FEET: 0

## 2023-12-01 ASSESSMENT — PAIN SCALES - GENERAL: PAINLEVEL: 0-NO PAIN

## 2023-12-01 NOTE — SIGNIFICANT EVENT
12/01/23 1145   Prechemo Checklist   Has the patient been in the hospital, ED, or urgent care since last date of service No   Chemo/Immuno Consent Signed Yes   Protocol/Indications Verified Yes   Confirmed to previous date/time of medication Yes   Compared to previous dose Yes   All medications are dated accurately Yes   Pregnancy Test Negative Not applicable   Parameters Met Yes   BSA/Weight-Height Verified Yes   Dose Calculations Verified Yes

## 2023-12-01 NOTE — PROGRESS NOTES
[facility] - Medical Oncology Follow-Up Visit    Patient ID: Dora Dueñas is a 63 y.o. female with stage IIb lung adenocarcinoma s/p RUL segmentectomy, with TP53 mutation and PD-L1 60%.     Current therapy: Planning adjuvant atezolizumab     Chief Concern: Here for followup and treatment readiness visit    HPI: Patient present in clinic for readiness to treat visit.  Reports she is feeling well.  Verbalized she is comfortable with her decision to receive immunotherapy.  Breathing is stable.  Appetite varies.  Ate well for Thanksgiving.  Other days can graze.  Wt is stable.  Tastes are improving since completing chemo regimen.  Denies n/v/d/c.  No fevers, chills, or CP.  No rashes or skin concerns   No other concerns today.   Ready for treatment.      Diagnosis: Lung adenocarcinoma  Stage:  Cancer Staging   Primary malignant neoplasm of right upper lobe of lung (CMS/HCC)  Staging form: Lung, AJCC 8th Edition  - Pathologic: Stage IIB (pT1c, pN1, cM0) - Signed by Keena Roy MD on 9/25/2023     Current sites of disease: JULITO s/p RUL segmentectomy  Molecular and ancillary testing:   NGS with TP53 mutation  PD-L1 60%    Oncologic History:  1/11/23 - cardiac CT with RUL nodule  2/14/23 - CT chest with RUL nodule  3/17/23 - PET with FDG-avid RUL nodule no suspicious LNs  6/5/23 - RUL segmentectomy and LN dissection with pT1cN1 adenocarcinoma; 1 peribronchial LN positive for malignancy  8/7/23 -10/9/23 - adjuvant cisplatin/pemetrexed (completed 4 cycles)  11/10/23 - C1 adjuvant atezolizumab    Oncology History   Primary malignant neoplasm of right upper lobe of lung (CMS/HCC)   6/12/2023 Initial Diagnosis    Primary malignant neoplasm of right upper lobe of lung (CMS/HCC)     8/7/2023 - 10/9/2023 Chemotherapy    PEMEtrexed / CISplatin, 21 Day Cycles - Thoracic      9/25/2023 Cancer Staged    Staging form: Lung, AJCC 8th Edition, Pathologic: Stage IIB (pT1c, pN1, cM0) - Signed by Keena POLK  MD Manuela on 9/25/2023     11/10/2023 -  Chemotherapy    Atezolizumab, 21 Day Cycles       Past Medical/Surgical Hx:   HTN  HLD  osteoarthritis  osteoporosis     Social Hx:   +tobacco use 1ppd since age 23, has quit at times in the past including recently prior to surgery, recently started again. Laser therapy helped for 3m  Lives at home with adult son whom she cares for (has autism) and her adult daughter who is a big support   Lots of friends and community who support her as well     Family Hx:   Paternal grandfather had lung cancer - was a smoker    Meds (Current):    Current Outpatient Medications:     ALPRAZolam (Xanax) 0.5 mg tablet, Take 1 tablet (0.5 mg) by mouth if needed., Disp: , Rfl:     atorvastatin (Lipitor) 40 mg tablet, Take 1 tablet (40 mg) by mouth once daily., Disp: , Rfl:     buPROPion XL (Wellbutrin XL) 150 mg 24 hr tablet, TAKE ONE TABLET BY MOUTH IN THE MORNING AS DIRECTED, Disp: , Rfl:     cholecalciferol (Vitamin D-3) 25 MCG (1000 UT) capsule, Take 1 capsule (25 mcg) by mouth once daily., Disp: , Rfl:     escitalopram (Lexapro) 20 mg tablet, TAKE TWO TABLETS BY MOUTH DAILY IN THE MORNING AS DIRECTED., Disp: , Rfl:     folic acid (Folvite) 1 mg tablet, Take 1 tablet (1 mg) by mouth once daily., Disp: , Rfl:     losartan (Cozaar) 100 mg tablet, TAKE ONE TABLET BY MOUTH EVERY DAY, Disp: 90 tablet, Rfl: 0  No current facility-administered medications for this visit.    Review of Systems - Oncology     Objective   BSA: 1.63 meters squared  /71 (BP Location: Left arm, Patient Position: Sitting)   Pulse 76   Temp 36.6 °C (97.9 °F) (Temporal)   Resp 14   Wt 60.2 kg (132 lb 11.5 oz)   SpO2 97%   BMI 23.96 kg/m²     Wt Readings from Last 5 Encounters:   12/01/23 60.2 kg (132 lb 11.5 oz)   11/10/23 59.4 kg (130 lb 15.3 oz)   10/20/23 60 kg (132 lb 3.2 oz)   10/09/23 58.2 kg (128 lb 3.2 oz)   10/06/23 58.5 kg (128 lb 15.5 oz)      Performance Status:  Asymptomatic - ECOG 0     Physical  Exam  Vitals and nursing note reviewed.   HENT:      Head: Normocephalic and atraumatic.   Eyes:      Pupils: Pupils are equal, round, and reactive to light.   Cardiovascular:      Rate and Rhythm: Normal rate and regular rhythm.      Heart sounds: Normal heart sounds.   Pulmonary:      Effort: Pulmonary effort is normal.      Breath sounds: Normal breath sounds.   Abdominal:      General: There is no distension.   Skin:     General: Skin is warm and dry.   Neurological:      General: No focal deficit present.      Mental Status: She is alert and oriented to person, place, and time.   Psychiatric:         Mood and Affect: Mood normal.         Behavior: Behavior normal.        Results:  Labs:  Lab Results   Component Value Date    WBC 7.0 11/30/2023    HGB 11.7 (L) 11/30/2023    HCT 36.0 11/30/2023    MCV 97 11/30/2023     11/30/2023      Lab Results   Component Value Date    NEUTROABS 3.62 11/30/2023      Lab Results   Component Value Date    GLUCOSE 98 11/30/2023    CALCIUM 9.6 11/30/2023     11/30/2023    K 4.0 11/30/2023    CO2 24 11/30/2023     11/30/2023    BUN 11 11/30/2023    CREATININE 0.67 11/30/2023    MG 1.66 10/09/2023     Lab Results   Component Value Date    ALT 30 11/30/2023    AST 27 11/30/2023    ALKPHOS 81 11/30/2023    BILITOT 0.7 11/30/2023      Imaging:  I personally reviewed the below imaging and concur with the findings as reported unless otherwise stated    Imaging:   No new imaging.    Pathology:  No new pathology    Assessment/Plan      Dora Dueñas is a 63 y.o. female here for follow up of stage IIb lung adenocarcinoma with PD-L1 60% and no actionable mutations on NGS, s/p adjuvant chemotherapy after RUL segmentectomy.    She tolerated adjuvant chemotherapy well with progressive fatigue after the 4th cycle. Here today to start adjuvant immunotherapy. Discussed logistics and side effects of atezolizumab including but not limited to risk of immune-related AEs - most  commonly thyroiditis, rash, colitis, fatigue but also rare risk of pneumonitis, myocarditis, nephritis, hepatitis, and other endocrine or neurologic AEs.     #Stage IIb lung adenocarcinoma -   - Proceed with adjuvant atezolizumab today, plan up to 1 year of treatment (17 doses)  - Consent signed 11/10  - Plan CT Chest with IV contrast q6m for now  - Reports she is comfortable with her decision to receive immunotherapy.     #Mucositis - due to chemotherapy  - resolved     # Hypokalemia  - Improved, likely from chemo, can stop and monitor     # Hyponatremia (chronic) - at baseline  - Pt asymptomatic  - Will continue to monitor     RTC in 3 weeks for C3 atezolizumab, patient encouraged to call for any concerns/questions.

## 2023-12-04 ENCOUNTER — TELEPHONE (OUTPATIENT)
Dept: ADMISSION | Facility: HOSPITAL | Age: 63
End: 2023-12-04
Payer: MEDICAID

## 2023-12-04 NOTE — TELEPHONE ENCOUNTER
"The patient called in, was wondering why the next treatment (C2) was scheduled 12/22, she thought this was supposed to only be given once a month. Per the recent note \"RTC in 3 weeks for C2 atezolizumab, patient encouraged to call if any issues prior to follow-up. \"    I called Dora and left her a VM that this medication is given every 3 weeks, which is why she is scheduled 12/22, I encouraged her to call our office back if she had any further questions or concerns.   "

## 2023-12-05 DIAGNOSIS — I10 HYPERTENSION: ICD-10-CM

## 2023-12-06 RX ORDER — LOSARTAN POTASSIUM 100 MG/1
100 TABLET ORAL DAILY
Qty: 90 TABLET | Refills: 0 | Status: SHIPPED | OUTPATIENT
Start: 2023-12-06 | End: 2024-04-17 | Stop reason: SDUPTHER

## 2023-12-08 RX ORDER — FAMOTIDINE 10 MG/ML
20 INJECTION INTRAVENOUS ONCE AS NEEDED
Status: CANCELLED | OUTPATIENT
Start: 2023-12-22

## 2023-12-08 RX ORDER — DIPHENHYDRAMINE HYDROCHLORIDE 50 MG/ML
50 INJECTION INTRAMUSCULAR; INTRAVENOUS AS NEEDED
Status: CANCELLED | OUTPATIENT
Start: 2023-12-22

## 2023-12-08 RX ORDER — PROCHLORPERAZINE MALEATE 5 MG
10 TABLET ORAL EVERY 6 HOURS PRN
Status: CANCELLED | OUTPATIENT
Start: 2023-12-22

## 2023-12-08 RX ORDER — ALBUTEROL SULFATE 0.83 MG/ML
3 SOLUTION RESPIRATORY (INHALATION) AS NEEDED
Status: CANCELLED | OUTPATIENT
Start: 2023-12-22

## 2023-12-08 RX ORDER — EPINEPHRINE 0.3 MG/.3ML
0.3 INJECTION SUBCUTANEOUS EVERY 5 MIN PRN
Status: CANCELLED | OUTPATIENT
Start: 2023-12-22

## 2023-12-08 RX ORDER — PROCHLORPERAZINE EDISYLATE 5 MG/ML
10 INJECTION INTRAMUSCULAR; INTRAVENOUS EVERY 6 HOURS PRN
Status: CANCELLED | OUTPATIENT
Start: 2023-12-22

## 2023-12-12 ENCOUNTER — OFFICE VISIT (OUTPATIENT)
Dept: PRIMARY CARE | Facility: CLINIC | Age: 63
End: 2023-12-12
Payer: MEDICARE

## 2023-12-12 ENCOUNTER — LAB (OUTPATIENT)
Dept: LAB | Facility: LAB | Age: 63
End: 2023-12-12
Payer: MEDICARE

## 2023-12-12 VITALS
SYSTOLIC BLOOD PRESSURE: 129 MMHG | TEMPERATURE: 98.4 F | HEART RATE: 94 BPM | HEIGHT: 62 IN | WEIGHT: 132.8 LBS | OXYGEN SATURATION: 95 % | BODY MASS INDEX: 24.44 KG/M2 | DIASTOLIC BLOOD PRESSURE: 76 MMHG

## 2023-12-12 DIAGNOSIS — Z11.59 NEED FOR HEPATITIS C SCREENING TEST: ICD-10-CM

## 2023-12-12 DIAGNOSIS — Z00.00 MEDICARE ANNUAL WELLNESS VISIT, INITIAL: Primary | ICD-10-CM

## 2023-12-12 DIAGNOSIS — Z12.31 ENCOUNTER FOR SCREENING MAMMOGRAM FOR BREAST CANCER: ICD-10-CM

## 2023-12-12 DIAGNOSIS — E78.2 MIXED HYPERLIPIDEMIA: ICD-10-CM

## 2023-12-12 DIAGNOSIS — R73.03 PREDIABETES: ICD-10-CM

## 2023-12-12 DIAGNOSIS — F32.0 MILD MAJOR DEPRESSION (CMS-HCC): ICD-10-CM

## 2023-12-12 DIAGNOSIS — C34.11 PRIMARY MALIGNANT NEOPLASM OF RIGHT UPPER LOBE OF LUNG (MULTI): ICD-10-CM

## 2023-12-12 PROBLEM — Z04.9 CONDITION NOT FOUND: Status: RESOLVED | Noted: 2023-10-05 | Resolved: 2023-12-12

## 2023-12-12 LAB
CHOLEST SERPL-MCNC: 228 MG/DL (ref 0–199)
CHOLESTEROL/HDL RATIO: 3.5
EST. AVERAGE GLUCOSE BLD GHB EST-MCNC: 103 MG/DL
HBA1C MFR BLD: 5.2 %
HCV AB SER QL: NONREACTIVE
HDLC SERPL-MCNC: 65.5 MG/DL
LDLC SERPL CALC-MCNC: 125 MG/DL
NON HDL CHOLESTEROL: 163 MG/DL (ref 0–149)
TRIGL SERPL-MCNC: 190 MG/DL (ref 0–149)
VLDL: 38 MG/DL (ref 0–40)

## 2023-12-12 PROCEDURE — 4004F PT TOBACCO SCREEN RCVD TLK: CPT | Performed by: FAMILY MEDICINE

## 2023-12-12 PROCEDURE — 1160F RVW MEDS BY RX/DR IN RCRD: CPT | Performed by: FAMILY MEDICINE

## 2023-12-12 PROCEDURE — 83036 HEMOGLOBIN GLYCOSYLATED A1C: CPT

## 2023-12-12 PROCEDURE — 90677 PCV20 VACCINE IM: CPT | Performed by: FAMILY MEDICINE

## 2023-12-12 PROCEDURE — 80061 LIPID PANEL: CPT

## 2023-12-12 PROCEDURE — 1126F AMNT PAIN NOTED NONE PRSNT: CPT | Performed by: FAMILY MEDICINE

## 2023-12-12 PROCEDURE — G0402 INITIAL PREVENTIVE EXAM: HCPCS | Performed by: FAMILY MEDICINE

## 2023-12-12 PROCEDURE — 1159F MED LIST DOCD IN RCRD: CPT | Performed by: FAMILY MEDICINE

## 2023-12-12 PROCEDURE — 86803 HEPATITIS C AB TEST: CPT

## 2023-12-12 PROCEDURE — 3078F DIAST BP <80 MM HG: CPT | Performed by: FAMILY MEDICINE

## 2023-12-12 PROCEDURE — 36415 COLL VENOUS BLD VENIPUNCTURE: CPT

## 2023-12-12 PROCEDURE — G0009 ADMIN PNEUMOCOCCAL VACCINE: HCPCS | Performed by: FAMILY MEDICINE

## 2023-12-12 PROCEDURE — 3074F SYST BP LT 130 MM HG: CPT | Performed by: FAMILY MEDICINE

## 2023-12-12 PROCEDURE — 1170F FXNL STATUS ASSESSED: CPT | Performed by: FAMILY MEDICINE

## 2023-12-12 ASSESSMENT — ACTIVITIES OF DAILY LIVING (ADL)
DRESSING: INDEPENDENT
BATHING: INDEPENDENT
TAKING_MEDICATION: INDEPENDENT
MANAGING_FINANCES: INDEPENDENT
DOING_HOUSEWORK: INDEPENDENT
GROCERY_SHOPPING: INDEPENDENT

## 2023-12-12 NOTE — PROGRESS NOTES
Subjective   Reason for Visit: Dora Dueñas is an 63 y.o. female here for a Medicare Wellness visit.     Past Medical, Surgical, and Family History reviewed and updated in chart.    Reviewed all medications by prescribing practitioner or clinical pharmacist (such as prescriptions, OTCs, herbal therapies and supplements) and documented in the medical record.    Very pleasant 63-year-old with history of lung cancer recent resection doing well  Has COPD hypertension hyperlipidemia  She is the main caregiver for her son who is developmentally disabled  She remains very active and independent is here today for her welcome to Medicare exam she has had no recent other hospital or ER visits other than her lung resection and she is doing very well is not oxygen dependent  She has been able to quit smoking  No angina palpitations or syncope        Patient Self Assessment of Health Status  Patient Self Assessment: Fair    Nutrition and Exercise  Current Diet: Well Balanced Diet  Adequate Fluid Intake: Yes  Caffeine: Yes  Exercise Frequency: Regularly    Functional Ability/Level of Safety  Cognitive Impairment Observed: No cognitive impairment observed    Home Safety Risk Factors: None         Patient Care Team:  Sonia Vuong MD as PCP - General  Keena Roy MD as Consulting Physician (Hematology and Oncology)     Review of Systems  Constitutional: no chills, no fever and no night sweats.   Eyes: no blurred vision and no eyesight problems.   ENT: no hearing loss, no nasal congestion, no nasal discharge, no hoarseness and no sore throat.   Cardiovascular: no chest pain, no intermittent leg claudication, no lower extremity edema, no palpitations and no syncope.   Respiratory: no cough, no shortness of breath during exertion, no shortness of breath at rest and no wheezing.   Gastrointestinal: no abdominal pain, no blood in stools, no constipation, no diarrhea, no melena, no nausea, no rectal pain and no vomiting.  "  Genitourinary: no dysuria, no change in urinary frequency, no urinary hesitancy, no feelings of urinary urgency and no vaginal discharge.   Musculoskeletal: no arthralgias,  no back pain and no myalgias.   Integumentary: no new skin lesions and no rashes.   Neurological: no difficulty walking, no headache, no limb weakness, no numbness and no tingling.   Psychiatric: no anxiety, no depression, no anhedonia and no substance use disorders.   Endocrine: no recent weight gain and no recent weight loss.   Hematologic/Lymphatic: no tendency for easy bruising and no swollen glands .    Medicare Wellness Billing Compliance Satisfied    *This is a visual tool to show completion of required items on the day of the visit. Green checks will only appear on the date of visit.      Objective   Vitals:  /76   Pulse 94   Temp 36.9 °C (98.4 °F)   Ht 1.585 m (5' 2.4\")   Wt 60.2 kg (132 lb 12.8 oz)   SpO2 95%   BMI 23.98 kg/m²       Physical Exam  The patient appeared well nourished and normally developed. Vital signs as documented. Head exam is unremarkable. No scleral icterus or corneal arcus noted.  Pupils are equal round reactive to light extraocular movements are intact no hemorrhages noted on funduscopic exam mouth mucous membranes are moist no exudates ears canals clear TMs are gray pearly not injected nose no rhinorrhea or epistaxis Neck is without jugular venous distension, thyromegaly, or carotid bruits. Carotid upstrokes are brisk bilaterally. Lungs are clear to auscultation and percussion. Cardiac exam reveals the PMI to be normally sized and situated. Rhythm is regular. First and second heart sounds normal. No murmurs, rubs or gallops. Abdominal exam reveals normal bowel sounds, no masses, no organomegaly and no aortic enlargement. Extremities are nonedematous and both femoral and pedal pulses are normal.  Neurologic exam DTRs are equal bilaterally no focal deficits strength is symmetrical heme lymph no " palpable lymph nodes in the neck axilla or groin    Assessment/Plan   Problem List Items Addressed This Visit       Hyperlipidemia    Relevant Orders    Lipid Panel    Mild major depression (CMS/HCC)    Primary malignant neoplasm of right upper lobe of lung (CMS/HCC)    Prediabetes    Relevant Orders    Hemoglobin A1C    Medicare annual wellness visit, initial - Primary    Current Assessment & Plan     Continue annual exams          Other Visit Diagnoses       Encounter for screening mammogram for breast cancer        Relevant Orders    BI mammo bilateral screening tomosynthesis    Need for hepatitis C screening test        Relevant Orders    Hepatitis C antibody        Unremarkable physical exam  Continue annual Medicare wellness exams

## 2023-12-20 ENCOUNTER — APPOINTMENT (OUTPATIENT)
Dept: SURGERY | Facility: CLINIC | Age: 63
End: 2023-12-20
Payer: MEDICARE

## 2023-12-20 NOTE — PROGRESS NOTES
"Betty Dueñas  is a 63 y.o. female who presents for evaluation of right upper lobe pulmonary cancer status postsurgical resection 6/5/2023.    This patient presents to medical attention with a calcium scoring CT scan which identified pulmonary nodules. She was referred for a CT scan which was performed on 2/14/2023 which showed a right upper lobe solid and groundglass lesion measuring 12 x 14 mm. She was referred for a PET/CT which was performed on 3/17/2023 which showed \"mild\" activity in this nodule with an SUV of 2.8 relative to the mediastinal blood pool of 2.1. I was concerned and recommended a CT-guided biopsy. The biopsy showed adenocarcinoma. She received a robotic right upper lobe segmental resection and mediastinal lymph node dissection on 6/5/2023.     This patient's pathology report showed a PT1N1 cancer. Margins negative. In setting of lymph node metastasis I recommended consideration of additional treatment. She was referred to medical oncology for adjuvant systemic treatment, which has also included immunotherapy. She is currently receiving this.    Currently the patient is presenting for routine follow-up and in their usual state of health. They deny the following symptoms: chest pain, shortness of breath at rest, shortness of breath with activity, cough, hemoptysis, fevers, chills, weight loss, abdominal pain, and wound complications.      There have been no significant changes to their documented medical, surgical and family history.     She  reports that she has quit smoking. Her smoking use included cigarettes. She has a 5.00 pack-year smoking history. She has never used smokeless tobacco. She reports that she does not drink alcohol and does not use drugs.    Objective   Physical Exam  The patient is well-appearing and in no acute distress. The trachea is midline and there is no crepitus. The lungs were clear to auscultation, there was no dullness to percussion, no fremitus or " egophony. There was good effort and excursion. The heart had a regular rate and rhythm. The abdomen was soft, nontender and nondistended. The extremities had no edema. Surgical incisions are healing well.  Diagnostic Studies  I reviewed a CT chest performed recently. I do not see any new or concerning nodules or adenopathy    Assessment/Plan   Overall, I believe that the patient is doing well.     I believe that there  is no clinical or radiographic signs of cancer recurrence. I would recommend ongoing treatment by medical oncology is reasonable.  I can follow her in parallel    I recommend  Follow-up in parallel with Medical oncology with phone call follow-up with me .     I discussed this in detail with the patient, including a discussion of alternatives. They were comfortable with this approach.     Sam Spencer MD  [phone]

## 2023-12-22 ENCOUNTER — APPOINTMENT (OUTPATIENT)
Dept: HEMATOLOGY/ONCOLOGY | Facility: CLINIC | Age: 63
End: 2023-12-22
Payer: MEDICARE

## 2023-12-27 ENCOUNTER — LAB (OUTPATIENT)
Dept: LAB | Facility: LAB | Age: 63
End: 2023-12-27
Payer: MEDICARE

## 2023-12-27 ENCOUNTER — OFFICE VISIT (OUTPATIENT)
Dept: SURGERY | Facility: CLINIC | Age: 63
End: 2023-12-27
Payer: MEDICARE

## 2023-12-27 VITALS
DIASTOLIC BLOOD PRESSURE: 75 MMHG | TEMPERATURE: 98.4 F | OXYGEN SATURATION: 97 % | SYSTOLIC BLOOD PRESSURE: 132 MMHG | BODY MASS INDEX: 24.01 KG/M2 | HEART RATE: 67 BPM | RESPIRATION RATE: 16 BRPM | HEIGHT: 63 IN | WEIGHT: 135.5 LBS

## 2023-12-27 DIAGNOSIS — C34.11 PRIMARY MALIGNANT NEOPLASM OF RIGHT UPPER LOBE OF LUNG (MULTI): Primary | ICD-10-CM

## 2023-12-27 DIAGNOSIS — C34.11 PRIMARY MALIGNANT NEOPLASM OF RIGHT UPPER LOBE OF LUNG (MULTI): ICD-10-CM

## 2023-12-27 LAB
ALBUMIN SERPL BCP-MCNC: 4.4 G/DL (ref 3.4–5)
ALP SERPL-CCNC: 77 U/L (ref 33–136)
ALT SERPL W P-5'-P-CCNC: 16 U/L (ref 7–45)
ANION GAP SERPL CALC-SCNC: 14 MMOL/L (ref 10–20)
AST SERPL W P-5'-P-CCNC: 17 U/L (ref 9–39)
BASOPHILS # BLD AUTO: 0.09 X10*3/UL (ref 0–0.1)
BASOPHILS NFR BLD AUTO: 1.4 %
BILIRUB SERPL-MCNC: 0.5 MG/DL (ref 0–1.2)
BUN SERPL-MCNC: 8 MG/DL (ref 6–23)
CALCIUM SERPL-MCNC: 9.5 MG/DL (ref 8.6–10.6)
CHLORIDE SERPL-SCNC: 103 MMOL/L (ref 98–107)
CO2 SERPL-SCNC: 26 MMOL/L (ref 21–32)
CORTIS AM PEAK SERPL-MSCNC: 12.2 UG/DL (ref 5–20)
CREAT SERPL-MCNC: 0.71 MG/DL (ref 0.5–1.05)
EOSINOPHIL # BLD AUTO: 0.09 X10*3/UL (ref 0–0.7)
EOSINOPHIL NFR BLD AUTO: 1.4 %
ERYTHROCYTE [DISTWIDTH] IN BLOOD BY AUTOMATED COUNT: 12.6 % (ref 11.5–14.5)
GFR SERPL CREATININE-BSD FRML MDRD: >90 ML/MIN/1.73M*2
GLUCOSE SERPL-MCNC: 92 MG/DL (ref 74–99)
HCT VFR BLD AUTO: 37.9 % (ref 36–46)
HGB BLD-MCNC: 12.3 G/DL (ref 12–16)
IMM GRANULOCYTES # BLD AUTO: 0.02 X10*3/UL (ref 0–0.7)
IMM GRANULOCYTES NFR BLD AUTO: 0.3 % (ref 0–0.9)
LYMPHOCYTES # BLD AUTO: 1.97 X10*3/UL (ref 1.2–4.8)
LYMPHOCYTES NFR BLD AUTO: 31.5 %
MCH RBC QN AUTO: 31.4 PG (ref 26–34)
MCHC RBC AUTO-ENTMCNC: 32.5 G/DL (ref 32–36)
MCV RBC AUTO: 97 FL (ref 80–100)
MONOCYTES # BLD AUTO: 0.49 X10*3/UL (ref 0.1–1)
MONOCYTES NFR BLD AUTO: 7.8 %
NEUTROPHILS # BLD AUTO: 3.59 X10*3/UL (ref 1.2–7.7)
NEUTROPHILS NFR BLD AUTO: 57.6 %
NRBC BLD-RTO: 0 /100 WBCS (ref 0–0)
PLATELET # BLD AUTO: 238 X10*3/UL (ref 150–450)
POTASSIUM SERPL-SCNC: 3.5 MMOL/L (ref 3.5–5.3)
PROT SERPL-MCNC: 7.4 G/DL (ref 6.4–8.2)
RBC # BLD AUTO: 3.92 X10*6/UL (ref 4–5.2)
SODIUM SERPL-SCNC: 139 MMOL/L (ref 136–145)
TSH SERPL-ACNC: 2.01 MIU/L (ref 0.44–3.98)
WBC # BLD AUTO: 6.3 X10*3/UL (ref 4.4–11.3)

## 2023-12-27 PROCEDURE — 1126F AMNT PAIN NOTED NONE PRSNT: CPT | Performed by: THORACIC SURGERY (CARDIOTHORACIC VASCULAR SURGERY)

## 2023-12-27 PROCEDURE — 80053 COMPREHEN METABOLIC PANEL: CPT

## 2023-12-27 PROCEDURE — 36415 COLL VENOUS BLD VENIPUNCTURE: CPT

## 2023-12-27 PROCEDURE — 99214 OFFICE O/P EST MOD 30 MIN: CPT | Performed by: THORACIC SURGERY (CARDIOTHORACIC VASCULAR SURGERY)

## 2023-12-27 PROCEDURE — 84443 ASSAY THYROID STIM HORMONE: CPT

## 2023-12-27 PROCEDURE — 4004F PT TOBACCO SCREEN RCVD TLK: CPT | Performed by: THORACIC SURGERY (CARDIOTHORACIC VASCULAR SURGERY)

## 2023-12-27 PROCEDURE — 3075F SYST BP GE 130 - 139MM HG: CPT | Performed by: THORACIC SURGERY (CARDIOTHORACIC VASCULAR SURGERY)

## 2023-12-27 PROCEDURE — 3078F DIAST BP <80 MM HG: CPT | Performed by: THORACIC SURGERY (CARDIOTHORACIC VASCULAR SURGERY)

## 2023-12-27 PROCEDURE — 1160F RVW MEDS BY RX/DR IN RCRD: CPT | Performed by: THORACIC SURGERY (CARDIOTHORACIC VASCULAR SURGERY)

## 2023-12-27 PROCEDURE — 85025 COMPLETE CBC W/AUTO DIFF WBC: CPT

## 2023-12-27 PROCEDURE — 82533 TOTAL CORTISOL: CPT

## 2023-12-27 PROCEDURE — 1159F MED LIST DOCD IN RCRD: CPT | Performed by: THORACIC SURGERY (CARDIOTHORACIC VASCULAR SURGERY)

## 2023-12-27 SDOH — ECONOMIC STABILITY: FOOD INSECURITY: WITHIN THE PAST 12 MONTHS, YOU WORRIED THAT YOUR FOOD WOULD RUN OUT BEFORE YOU GOT MONEY TO BUY MORE.: NEVER TRUE

## 2023-12-27 SDOH — ECONOMIC STABILITY: FOOD INSECURITY: WITHIN THE PAST 12 MONTHS, THE FOOD YOU BOUGHT JUST DIDN'T LAST AND YOU DIDN'T HAVE MONEY TO GET MORE.: NEVER TRUE

## 2023-12-27 ASSESSMENT — COLUMBIA-SUICIDE SEVERITY RATING SCALE - C-SSRS
6. HAVE YOU EVER DONE ANYTHING, STARTED TO DO ANYTHING, OR PREPARED TO DO ANYTHING TO END YOUR LIFE?: NO
2. HAVE YOU ACTUALLY HAD ANY THOUGHTS OF KILLING YOURSELF?: NO
1. IN THE PAST MONTH, HAVE YOU WISHED YOU WERE DEAD OR WISHED YOU COULD GO TO SLEEP AND NOT WAKE UP?: NO

## 2023-12-27 ASSESSMENT — PATIENT HEALTH QUESTIONNAIRE - PHQ9
1. LITTLE INTEREST OR PLEASURE IN DOING THINGS: NOT AT ALL
SUM OF ALL RESPONSES TO PHQ9 QUESTIONS 1 AND 2: 0
2. FEELING DOWN, DEPRESSED OR HOPELESS: NOT AT ALL

## 2023-12-27 ASSESSMENT — ENCOUNTER SYMPTOMS
OCCASIONAL FEELINGS OF UNSTEADINESS: 0
DEPRESSION: 0
LOSS OF SENSATION IN FEET: 0

## 2023-12-27 ASSESSMENT — PAIN SCALES - GENERAL: PAINLEVEL: 0-NO PAIN

## 2023-12-29 ENCOUNTER — INFUSION (OUTPATIENT)
Dept: HEMATOLOGY/ONCOLOGY | Facility: CLINIC | Age: 63
End: 2023-12-29
Payer: MEDICARE

## 2023-12-29 ENCOUNTER — OFFICE VISIT (OUTPATIENT)
Dept: HEMATOLOGY/ONCOLOGY | Facility: CLINIC | Age: 63
End: 2023-12-29
Payer: MEDICARE

## 2023-12-29 VITALS
HEART RATE: 68 BPM | WEIGHT: 135.58 LBS | DIASTOLIC BLOOD PRESSURE: 77 MMHG | BODY MASS INDEX: 24.4 KG/M2 | SYSTOLIC BLOOD PRESSURE: 143 MMHG | TEMPERATURE: 98.2 F | RESPIRATION RATE: 14 BRPM | OXYGEN SATURATION: 93 %

## 2023-12-29 DIAGNOSIS — Z51.12 ENCOUNTER FOR ANTINEOPLASTIC IMMUNOTHERAPY: ICD-10-CM

## 2023-12-29 DIAGNOSIS — C34.11 PRIMARY MALIGNANT NEOPLASM OF RIGHT UPPER LOBE OF LUNG (MULTI): Primary | ICD-10-CM

## 2023-12-29 DIAGNOSIS — C34.11 PRIMARY MALIGNANT NEOPLASM OF RIGHT UPPER LOBE OF LUNG (MULTI): ICD-10-CM

## 2023-12-29 PROCEDURE — 3078F DIAST BP <80 MM HG: CPT | Performed by: NURSE PRACTITIONER

## 2023-12-29 PROCEDURE — 1160F RVW MEDS BY RX/DR IN RCRD: CPT | Performed by: NURSE PRACTITIONER

## 2023-12-29 PROCEDURE — 4004F PT TOBACCO SCREEN RCVD TLK: CPT | Performed by: NURSE PRACTITIONER

## 2023-12-29 PROCEDURE — 1159F MED LIST DOCD IN RCRD: CPT | Performed by: NURSE PRACTITIONER

## 2023-12-29 PROCEDURE — 2500000004 HC RX 250 GENERAL PHARMACY W/ HCPCS (ALT 636 FOR OP/ED): Mod: SE | Performed by: NURSE PRACTITIONER

## 2023-12-29 PROCEDURE — 1126F AMNT PAIN NOTED NONE PRSNT: CPT | Performed by: NURSE PRACTITIONER

## 2023-12-29 PROCEDURE — 3077F SYST BP >= 140 MM HG: CPT | Performed by: NURSE PRACTITIONER

## 2023-12-29 PROCEDURE — 99213 OFFICE O/P EST LOW 20 MIN: CPT | Performed by: NURSE PRACTITIONER

## 2023-12-29 PROCEDURE — 96365 THER/PROPH/DIAG IV INF INIT: CPT | Mod: INF

## 2023-12-29 PROCEDURE — 96413 CHEMO IV INFUSION 1 HR: CPT

## 2023-12-29 RX ORDER — EPINEPHRINE 0.3 MG/.3ML
0.3 INJECTION SUBCUTANEOUS EVERY 5 MIN PRN
Status: CANCELLED | OUTPATIENT
Start: 2024-01-19

## 2023-12-29 RX ORDER — FAMOTIDINE 10 MG/ML
20 INJECTION INTRAVENOUS ONCE AS NEEDED
Status: CANCELLED | OUTPATIENT
Start: 2024-01-19

## 2023-12-29 RX ORDER — EPINEPHRINE 0.3 MG/.3ML
0.3 INJECTION SUBCUTANEOUS EVERY 5 MIN PRN
Status: DISCONTINUED | OUTPATIENT
Start: 2023-12-29 | End: 2023-12-29 | Stop reason: HOSPADM

## 2023-12-29 RX ORDER — ALBUTEROL SULFATE 0.83 MG/ML
3 SOLUTION RESPIRATORY (INHALATION) AS NEEDED
Status: DISCONTINUED | OUTPATIENT
Start: 2023-12-29 | End: 2023-12-29 | Stop reason: HOSPADM

## 2023-12-29 RX ORDER — ALBUTEROL SULFATE 0.83 MG/ML
3 SOLUTION RESPIRATORY (INHALATION) AS NEEDED
Status: CANCELLED | OUTPATIENT
Start: 2024-01-19

## 2023-12-29 RX ORDER — PROCHLORPERAZINE MALEATE 10 MG
10 TABLET ORAL EVERY 6 HOURS PRN
Status: DISCONTINUED | OUTPATIENT
Start: 2023-12-29 | End: 2023-12-29 | Stop reason: HOSPADM

## 2023-12-29 RX ORDER — FAMOTIDINE 10 MG/ML
20 INJECTION INTRAVENOUS ONCE AS NEEDED
Status: DISCONTINUED | OUTPATIENT
Start: 2023-12-29 | End: 2023-12-29 | Stop reason: HOSPADM

## 2023-12-29 RX ORDER — PROCHLORPERAZINE EDISYLATE 5 MG/ML
10 INJECTION INTRAMUSCULAR; INTRAVENOUS EVERY 6 HOURS PRN
Status: DISCONTINUED | OUTPATIENT
Start: 2023-12-29 | End: 2023-12-29 | Stop reason: HOSPADM

## 2023-12-29 RX ORDER — PROCHLORPERAZINE MALEATE 5 MG
10 TABLET ORAL EVERY 6 HOURS PRN
Status: CANCELLED | OUTPATIENT
Start: 2024-01-19

## 2023-12-29 RX ORDER — DIPHENHYDRAMINE HYDROCHLORIDE 50 MG/ML
50 INJECTION INTRAMUSCULAR; INTRAVENOUS AS NEEDED
Status: DISCONTINUED | OUTPATIENT
Start: 2023-12-29 | End: 2023-12-29 | Stop reason: HOSPADM

## 2023-12-29 RX ORDER — DIPHENHYDRAMINE HYDROCHLORIDE 50 MG/ML
50 INJECTION INTRAMUSCULAR; INTRAVENOUS AS NEEDED
Status: CANCELLED | OUTPATIENT
Start: 2024-01-19

## 2023-12-29 RX ORDER — PROCHLORPERAZINE EDISYLATE 5 MG/ML
10 INJECTION INTRAMUSCULAR; INTRAVENOUS EVERY 6 HOURS PRN
Status: CANCELLED | OUTPATIENT
Start: 2024-01-19

## 2023-12-29 RX ADMIN — ATEZOLIZUMAB 1200 MG: 1200 INJECTION, SOLUTION INTRAVENOUS at 10:24

## 2023-12-29 ASSESSMENT — ENCOUNTER SYMPTOMS
DIARRHEA: 1
LOSS OF SENSATION IN FEET: 0
DEPRESSION: 0
OCCASIONAL FEELINGS OF UNSTEADINESS: 0

## 2023-12-29 ASSESSMENT — PAIN SCALES - GENERAL: PAINLEVEL: 0-NO PAIN

## 2023-12-29 NOTE — SIGNIFICANT EVENT
12/29/23 0943   Prechemo Checklist   Has the patient been in the hospital, ED, or urgent care since last date of service No   Chemo/Immuno Consent Signed Yes   Protocol/Indications Verified Yes   Confirmed to previous date/time of medication Yes   Compared to previous dose Yes   All medications are dated accurately Yes   Pregnancy Test Negative Not applicable   Parameters Met Yes   Dose Calculations Verified Yes

## 2023-12-29 NOTE — PROGRESS NOTES
[facility] - Medical Oncology Follow-Up Visit    Patient ID: Dora Dueñas is a 63 y.o. female with stage IIb lung adenocarcinoma s/p RUL segmentectomy, with TP53 mutation and PD-L1 60%.     Current therapy: Planning adjuvant atezolizumab     Chief Concern: Here for followup and treatment readiness visit    HPI: Patient present in clinic for readiness to treat visit.  Her dtr Elizabeth is present for visit.  Overall feeling well.  Reports fatigue (grade 1) d/t the holidays.  Breathing is stable.   Appetite is good w/stable wt.  Denies n/v/c.  Andi Pearson - ate Chinese food, both her dtr and her had diarrhea. Isolated incident.  No rashes or skin concerns.   Visit with Dr. Spencer on Wed with planned FUV in 6mo.  No other concerns today.  Ready for treatment.      Diagnosis: Lung adenocarcinoma  Stage:  Cancer Staging   Primary malignant neoplasm of right upper lobe of lung (CMS/HCC)  Staging form: Lung, AJCC 8th Edition  - Pathologic: Stage IIB (pT1c, pN1, cM0) - Signed by Keena Roy MD on 9/25/2023     Current sites of disease: JULITO s/p RUL segmentectomy  Molecular and ancillary testing:   NGS with TP53 mutation  PD-L1 60%    Oncologic History:  1/11/23 - cardiac CT with RUL nodule  2/14/23 - CT chest with RUL nodule  3/17/23 - PET with FDG-avid RUL nodule no suspicious LNs  6/5/23 - RUL segmentectomy and LN dissection with pT1cN1 adenocarcinoma; 1 peribronchial LN positive for malignancy  8/7/23 -10/9/23 - adjuvant cisplatin/pemetrexed (completed 4 cycles)  11/10/23 - C1 adjuvant atezolizumab    Oncology History   Primary malignant neoplasm of right upper lobe of lung (CMS/HCC)   6/12/2023 Initial Diagnosis    Primary malignant neoplasm of right upper lobe of lung (CMS/HCC)     8/7/2023 - 10/9/2023 Chemotherapy    PEMEtrexed / CISplatin, 21 Day Cycles - Thoracic      9/25/2023 Cancer Staged    Staging form: Lung, AJCC 8th Edition, Pathologic: Stage IIB (pT1c, pN1, cM0) - Signed by  Keena Roy MD on 9/25/2023     11/10/2023 -  Chemotherapy    Atezolizumab, 21 Day Cycles       Past Medical/Surgical Hx:   HTN  HLD  osteoarthritis  osteoporosis     Social Hx:   +tobacco use 1ppd since age 23, has quit at times in the past including recently prior to surgery, recently started again. Laser therapy helped for 3m  Lives at home with adult son whom she cares for (has autism) and her adult daughter who is a big support   Lots of friends and community who support her as well     Family Hx:   Paternal grandfather had lung cancer - was a smoker    Meds (Current):    Current Outpatient Medications:     ALPRAZolam (Xanax) 0.5 mg tablet, Take 1 tablet (0.5 mg) by mouth if needed., Disp: , Rfl:     atorvastatin (Lipitor) 40 mg tablet, Take 1 tablet (40 mg) by mouth once daily., Disp: , Rfl:     buPROPion XL (Wellbutrin XL) 150 mg 24 hr tablet, TAKE ONE TABLET BY MOUTH IN THE MORNING AS DIRECTED, Disp: , Rfl:     cholecalciferol (Vitamin D-3) 25 MCG (1000 UT) capsule, Take 1 capsule (25 mcg) by mouth once daily., Disp: , Rfl:     escitalopram (Lexapro) 20 mg tablet, TAKE TWO TABLETS BY MOUTH DAILY IN THE MORNING AS DIRECTED., Disp: , Rfl:     losartan (Cozaar) 100 mg tablet, TAKE ONE TABLET BY MOUTH EVERY DAY, Disp: 90 tablet, Rfl: 0    Review of Systems   Gastrointestinal:  Positive for diarrhea.        Food related, isolated incident       Objective   BSA: 1.65 meters squared  /77 (BP Location: Left arm, Patient Position: Sitting)   Pulse 68   Temp 36.8 °C (98.2 °F) (Temporal)   Resp 14   Wt 61.5 kg (135 lb 9.3 oz)   SpO2 93%   BMI 24.40 kg/m²     Wt Readings from Last 5 Encounters:   12/29/23 61.5 kg (135 lb 9.3 oz)   12/27/23 61.5 kg (135 lb 8 oz)   12/12/23 60.2 kg (132 lb 12.8 oz)   12/01/23 60.2 kg (132 lb 11.5 oz)   11/10/23 59.4 kg (130 lb 15.3 oz)      Performance Status:  Asymptomatic - ECOG 0     Physical Exam  Vitals and nursing note reviewed.   HENT:      Head: Normocephalic and  atraumatic.   Eyes:      Pupils: Pupils are equal, round, and reactive to light.   Cardiovascular:      Rate and Rhythm: Normal rate and regular rhythm.      Heart sounds: Normal heart sounds.   Pulmonary:      Effort: Pulmonary effort is normal.      Breath sounds: Normal breath sounds.   Abdominal:      General: There is no distension.   Skin:     General: Skin is warm and dry.   Neurological:      General: No focal deficit present.      Mental Status: She is alert and oriented to person, place, and time.   Psychiatric:         Mood and Affect: Mood normal.         Behavior: Behavior normal.      Results:  Labs:  Lab Results   Component Value Date    WBC 6.3 12/27/2023    HGB 12.3 12/27/2023    HCT 37.9 12/27/2023    MCV 97 12/27/2023     12/27/2023      Lab Results   Component Value Date    NEUTROABS 3.59 12/27/2023      Lab Results   Component Value Date    GLUCOSE 92 12/27/2023    CALCIUM 9.5 12/27/2023     12/27/2023    K 3.5 12/27/2023    CO2 26 12/27/2023     12/27/2023    BUN 8 12/27/2023    CREATININE 0.71 12/27/2023    MG 1.66 10/09/2023     Lab Results   Component Value Date    ALT 16 12/27/2023    AST 17 12/27/2023    ALKPHOS 77 12/27/2023    BILITOT 0.5 12/27/2023      Imaging:   No new imaging.    Pathology:  No new pathology    Assessment/Plan      Dora Dueñas is a 63 y.o. female here for follow up of stage IIb lung adenocarcinoma with PD-L1 60% and no actionable mutations on NGS, s/p adjuvant chemotherapy after RUL segmentectomy.    She tolerated adjuvant chemotherapy well with progressive fatigue after the 4th cycle. Here today to start adjuvant immunotherapy. Discussed logistics and side effects of atezolizumab including but not limited to risk of immune-related AEs - most commonly thyroiditis, rash, colitis, fatigue but also rare risk of pneumonitis, myocarditis, nephritis, hepatitis, and other endocrine or neurologic AEs.     #Stage IIb lung adenocarcinoma -   - Proceed  with adjuvant atezolizumab today, plan up to 1 year of treatment (17 doses)  - Consent signed 11/10  - Plan CT Chest with IV contrast q6m for now (last scan 11/2/23)  - Reports she is comfortable with her decision to receive immunotherapy.     #Mucositis - due to chemotherapy  - resolved     # Hypokalemia  - Improved, likely from chemo, ongoing monitor  - resolved     # Hyponatremia (chronic) - at baseline  - Pt asymptomatic  - Will continue to monitor     RTC in 3 weeks for C4 atezolizumab, patient encouraged to call for any concerns/questions.

## 2024-01-12 ENCOUNTER — APPOINTMENT (OUTPATIENT)
Dept: HEMATOLOGY/ONCOLOGY | Facility: CLINIC | Age: 64
End: 2024-01-12
Payer: MEDICARE

## 2024-01-18 ENCOUNTER — LAB (OUTPATIENT)
Dept: LAB | Facility: LAB | Age: 64
End: 2024-01-18
Payer: MEDICAID

## 2024-01-18 DIAGNOSIS — C34.11 PRIMARY MALIGNANT NEOPLASM OF RIGHT UPPER LOBE OF LUNG (MULTI): ICD-10-CM

## 2024-01-18 LAB
ALBUMIN SERPL BCP-MCNC: 4.7 G/DL (ref 3.4–5)
ALP SERPL-CCNC: 79 U/L (ref 33–136)
ALT SERPL W P-5'-P-CCNC: 14 U/L (ref 7–45)
ANION GAP SERPL CALC-SCNC: 17 MMOL/L (ref 10–20)
AST SERPL W P-5'-P-CCNC: 16 U/L (ref 9–39)
BASOPHILS # BLD AUTO: 0.08 X10*3/UL (ref 0–0.1)
BASOPHILS NFR BLD AUTO: 1.3 %
BILIRUB SERPL-MCNC: 0.7 MG/DL (ref 0–1.2)
BUN SERPL-MCNC: 12 MG/DL (ref 6–23)
CALCIUM SERPL-MCNC: 9.4 MG/DL (ref 8.6–10.6)
CHLORIDE SERPL-SCNC: 100 MMOL/L (ref 98–107)
CO2 SERPL-SCNC: 26 MMOL/L (ref 21–32)
CREAT SERPL-MCNC: 0.75 MG/DL (ref 0.5–1.05)
EGFRCR SERPLBLD CKD-EPI 2021: 88 ML/MIN/1.73M*2
EOSINOPHIL # BLD AUTO: 0.08 X10*3/UL (ref 0–0.7)
EOSINOPHIL NFR BLD AUTO: 1.3 %
ERYTHROCYTE [DISTWIDTH] IN BLOOD BY AUTOMATED COUNT: 12 % (ref 11.5–14.5)
GLUCOSE SERPL-MCNC: 109 MG/DL (ref 74–99)
HCT VFR BLD AUTO: 41.5 % (ref 36–46)
HGB BLD-MCNC: 13.3 G/DL (ref 12–16)
IMM GRANULOCYTES # BLD AUTO: 0.01 X10*3/UL (ref 0–0.7)
IMM GRANULOCYTES NFR BLD AUTO: 0.2 % (ref 0–0.9)
LYMPHOCYTES # BLD AUTO: 2.22 X10*3/UL (ref 1.2–4.8)
LYMPHOCYTES NFR BLD AUTO: 34.7 %
MCH RBC QN AUTO: 30.6 PG (ref 26–34)
MCHC RBC AUTO-ENTMCNC: 32 G/DL (ref 32–36)
MCV RBC AUTO: 95 FL (ref 80–100)
MONOCYTES # BLD AUTO: 0.47 X10*3/UL (ref 0.1–1)
MONOCYTES NFR BLD AUTO: 7.3 %
NEUTROPHILS # BLD AUTO: 3.54 X10*3/UL (ref 1.2–7.7)
NEUTROPHILS NFR BLD AUTO: 55.2 %
NRBC BLD-RTO: 0 /100 WBCS (ref 0–0)
PLATELET # BLD AUTO: 237 X10*3/UL (ref 150–450)
POTASSIUM SERPL-SCNC: 3.7 MMOL/L (ref 3.5–5.3)
PROT SERPL-MCNC: 8 G/DL (ref 6.4–8.2)
RBC # BLD AUTO: 4.35 X10*6/UL (ref 4–5.2)
SODIUM SERPL-SCNC: 139 MMOL/L (ref 136–145)
WBC # BLD AUTO: 6.4 X10*3/UL (ref 4.4–11.3)

## 2024-01-18 PROCEDURE — 85025 COMPLETE CBC W/AUTO DIFF WBC: CPT

## 2024-01-18 PROCEDURE — 80053 COMPREHEN METABOLIC PANEL: CPT

## 2024-01-18 PROCEDURE — 36415 COLL VENOUS BLD VENIPUNCTURE: CPT

## 2024-01-19 ENCOUNTER — INFUSION (OUTPATIENT)
Dept: HEMATOLOGY/ONCOLOGY | Facility: CLINIC | Age: 64
End: 2024-01-19
Payer: MEDICARE

## 2024-01-19 ENCOUNTER — OFFICE VISIT (OUTPATIENT)
Dept: HEMATOLOGY/ONCOLOGY | Facility: CLINIC | Age: 64
End: 2024-01-19
Payer: MEDICARE

## 2024-01-19 VITALS
HEART RATE: 58 BPM | WEIGHT: 132.5 LBS | SYSTOLIC BLOOD PRESSURE: 161 MMHG | TEMPERATURE: 97.5 F | OXYGEN SATURATION: 96 % | RESPIRATION RATE: 14 BRPM | BODY MASS INDEX: 23.85 KG/M2 | DIASTOLIC BLOOD PRESSURE: 77 MMHG

## 2024-01-19 DIAGNOSIS — C34.11 PRIMARY MALIGNANT NEOPLASM OF RIGHT UPPER LOBE OF LUNG (MULTI): Primary | ICD-10-CM

## 2024-01-19 DIAGNOSIS — Z51.12 ENCOUNTER FOR ANTINEOPLASTIC IMMUNOTHERAPY: ICD-10-CM

## 2024-01-19 DIAGNOSIS — C34.11 PRIMARY MALIGNANT NEOPLASM OF RIGHT UPPER LOBE OF LUNG (MULTI): ICD-10-CM

## 2024-01-19 PROCEDURE — 2500000004 HC RX 250 GENERAL PHARMACY W/ HCPCS (ALT 636 FOR OP/ED): Mod: SE | Performed by: NURSE PRACTITIONER

## 2024-01-19 PROCEDURE — 99213 OFFICE O/P EST LOW 20 MIN: CPT | Performed by: NURSE PRACTITIONER

## 2024-01-19 PROCEDURE — 1160F RVW MEDS BY RX/DR IN RCRD: CPT | Performed by: NURSE PRACTITIONER

## 2024-01-19 PROCEDURE — 1036F TOBACCO NON-USER: CPT | Performed by: NURSE PRACTITIONER

## 2024-01-19 PROCEDURE — 1159F MED LIST DOCD IN RCRD: CPT | Performed by: NURSE PRACTITIONER

## 2024-01-19 PROCEDURE — 1126F AMNT PAIN NOTED NONE PRSNT: CPT | Performed by: NURSE PRACTITIONER

## 2024-01-19 PROCEDURE — 3078F DIAST BP <80 MM HG: CPT | Performed by: NURSE PRACTITIONER

## 2024-01-19 PROCEDURE — 3077F SYST BP >= 140 MM HG: CPT | Performed by: NURSE PRACTITIONER

## 2024-01-19 PROCEDURE — 96413 CHEMO IV INFUSION 1 HR: CPT

## 2024-01-19 RX ORDER — ALBUTEROL SULFATE 0.83 MG/ML
3 SOLUTION RESPIRATORY (INHALATION) AS NEEDED
Status: DISCONTINUED | OUTPATIENT
Start: 2024-01-19 | End: 2024-01-19 | Stop reason: HOSPADM

## 2024-01-19 RX ORDER — PROCHLORPERAZINE EDISYLATE 5 MG/ML
10 INJECTION INTRAMUSCULAR; INTRAVENOUS EVERY 6 HOURS PRN
Status: DISCONTINUED | OUTPATIENT
Start: 2024-01-19 | End: 2024-01-19 | Stop reason: HOSPADM

## 2024-01-19 RX ORDER — PROCHLORPERAZINE EDISYLATE 5 MG/ML
10 INJECTION INTRAMUSCULAR; INTRAVENOUS EVERY 6 HOURS PRN
Status: CANCELLED | OUTPATIENT
Start: 2024-02-09

## 2024-01-19 RX ORDER — FAMOTIDINE 10 MG/ML
20 INJECTION INTRAVENOUS ONCE AS NEEDED
Status: CANCELLED | OUTPATIENT
Start: 2024-02-09

## 2024-01-19 RX ORDER — FAMOTIDINE 10 MG/ML
20 INJECTION INTRAVENOUS ONCE AS NEEDED
Status: DISCONTINUED | OUTPATIENT
Start: 2024-01-19 | End: 2024-01-19 | Stop reason: HOSPADM

## 2024-01-19 RX ORDER — PROCHLORPERAZINE MALEATE 5 MG
10 TABLET ORAL EVERY 6 HOURS PRN
Status: CANCELLED | OUTPATIENT
Start: 2024-02-09

## 2024-01-19 RX ORDER — PROCHLORPERAZINE MALEATE 10 MG
10 TABLET ORAL EVERY 6 HOURS PRN
Status: DISCONTINUED | OUTPATIENT
Start: 2024-01-19 | End: 2024-01-19 | Stop reason: HOSPADM

## 2024-01-19 RX ORDER — ALBUTEROL SULFATE 0.83 MG/ML
3 SOLUTION RESPIRATORY (INHALATION) AS NEEDED
Status: CANCELLED | OUTPATIENT
Start: 2024-02-09

## 2024-01-19 RX ORDER — EPINEPHRINE 0.3 MG/.3ML
0.3 INJECTION SUBCUTANEOUS EVERY 5 MIN PRN
Status: CANCELLED | OUTPATIENT
Start: 2024-02-09

## 2024-01-19 RX ORDER — EPINEPHRINE 0.3 MG/.3ML
0.3 INJECTION SUBCUTANEOUS EVERY 5 MIN PRN
Status: DISCONTINUED | OUTPATIENT
Start: 2024-01-19 | End: 2024-01-19 | Stop reason: HOSPADM

## 2024-01-19 RX ORDER — DIPHENHYDRAMINE HYDROCHLORIDE 50 MG/ML
50 INJECTION INTRAMUSCULAR; INTRAVENOUS AS NEEDED
Status: CANCELLED | OUTPATIENT
Start: 2024-02-09

## 2024-01-19 RX ORDER — DIPHENHYDRAMINE HYDROCHLORIDE 50 MG/ML
50 INJECTION INTRAMUSCULAR; INTRAVENOUS AS NEEDED
Status: DISCONTINUED | OUTPATIENT
Start: 2024-01-19 | End: 2024-01-19 | Stop reason: HOSPADM

## 2024-01-19 RX ADMIN — ATEZOLIZUMAB 1200 MG: 1200 INJECTION, SOLUTION INTRAVENOUS at 11:39

## 2024-01-19 ASSESSMENT — PAIN SCALES - GENERAL: PAINLEVEL: 0-NO PAIN

## 2024-01-19 ASSESSMENT — ENCOUNTER SYMPTOMS: DIARRHEA: 0

## 2024-01-19 NOTE — PROGRESS NOTES
Patient was seen and assessed by NP. Patient does not complain of any side effects from immunotherapy. Patient has no complaints.

## 2024-01-19 NOTE — SIGNIFICANT EVENT
01/19/24 1105   Prechemo Checklist   Has the patient been in the hospital, ED, or urgent care since last date of service No   Chemo/Immuno Consent Signed Yes   Protocol/Indications Verified Yes   Confirmed to previous date/time of medication Yes   Compared to previous dose Yes   All medications are dated accurately Yes   Pregnancy Test Negative Not applicable   Parameters Met Yes   BSA/Weight-Height Verified Yes   Dose Calculations Verified Yes

## 2024-01-19 NOTE — PROGRESS NOTES
[facility] - Medical Oncology Follow-Up Visit    Patient ID: Dora Dueñas is a 63 y.o. female with stage IIb lung adenocarcinoma s/p RUL segmentectomy, with TP53 mutation and PD-L1 60%.     Current therapy: adjuvant atezolizumab     Chief Concern: Here for followup and treatment readiness visit    HPI: Patient present in clinic for follow-up and readiness to treat visit.  She is feeling well today.  Breathing feels good.  Reports mild upper respiratory congestion.  Started a couple of days ago.  Denies fevers or chills.  No c/o CP.  Appetite is good, with stable wt.  Denies n/v/d.  Recent episode of constipation-diet related, resolved.  Excited to have joined [employer].  No rashes or skin concerns.  No other concerns today.  Ready for treatment.      Diagnosis: Lung adenocarcinoma  Stage:  Cancer Staging   Primary malignant neoplasm of right upper lobe of lung (CMS/HCC)  Staging form: Lung, AJCC 8th Edition  - Pathologic: Stage IIB (pT1c, pN1, cM0) - Signed by Keena Roy MD on 9/25/2023     Current sites of disease: JULITO s/p RUL segmentectomy  Molecular and ancillary testing:   NGS with TP53 mutation  PD-L1 60%    Oncologic History:  1/11/23 - cardiac CT with RUL nodule  2/14/23 - CT chest with RUL nodule  3/17/23 - PET with FDG-avid RUL nodule no suspicious LNs  6/5/23 - RUL segmentectomy and LN dissection with pT1cN1 adenocarcinoma; 1 peribronchial LN positive for malignancy  8/7/23 -10/9/23 - adjuvant cisplatin/pemetrexed (completed 4 cycles)  11/10/23 - C1 adjuvant atezolizumab    Oncology History   Primary malignant neoplasm of right upper lobe of lung (CMS/HCC)   6/12/2023 Initial Diagnosis    Primary malignant neoplasm of right upper lobe of lung (CMS/HCC)     8/7/2023 - 10/9/2023 Chemotherapy    PEMEtrexed / CISplatin, 21 Day Cycles - Thoracic      9/25/2023 Cancer Staged    Staging form: Lung, AJCC 8th Edition, Pathologic: Stage IIB (pT1c, pN1, cM0) - Signed by  Keena Roy MD on 9/25/2023     11/10/2023 -  Chemotherapy    Atezolizumab, 21 Day Cycles       Past Medical/Surgical Hx:   HTN  HLD  osteoarthritis  osteoporosis     Social Hx:   +tobacco use 1ppd since age 23, has quit at times in the past including recently prior to surgery, recently started again. Laser therapy helped for 3m  Lives at home with adult son whom she cares for (has autism) and her adult daughter who is a big support   Lots of friends and community who support her as well     Family Hx:   Paternal grandfather had lung cancer - was a smoker    Meds (Current):    Current Outpatient Medications:     ALPRAZolam (Xanax) 0.5 mg tablet, Take 1 tablet (0.5 mg) by mouth if needed., Disp: , Rfl:     atorvastatin (Lipitor) 40 mg tablet, Take 1 tablet (40 mg) by mouth once daily., Disp: , Rfl:     buPROPion XL (Wellbutrin XL) 150 mg 24 hr tablet, TAKE ONE TABLET BY MOUTH IN THE MORNING AS DIRECTED, Disp: , Rfl:     cholecalciferol (Vitamin D-3) 25 MCG (1000 UT) capsule, Take 1 capsule (25 mcg) by mouth once daily., Disp: , Rfl:     escitalopram (Lexapro) 20 mg tablet, TAKE TWO TABLETS BY MOUTH DAILY IN THE MORNING AS DIRECTED., Disp: , Rfl:     losartan (Cozaar) 100 mg tablet, TAKE ONE TABLET BY MOUTH EVERY DAY, Disp: 90 tablet, Rfl: 0    Review of Systems   Respiratory:          Upper chest congestion (mild)   Gastrointestinal:  Negative for diarrhea.        Food related, isolated incident       Objective   BSA: 1.63 meters squared  /77 (BP Location: Left arm, Patient Position: Sitting)   Pulse 58   Temp 36.4 °C (97.5 °F) (Temporal)   Resp 14   Wt 60.1 kg (132 lb 7.9 oz)   SpO2 96%   BMI 23.85 kg/m²     Wt Readings from Last 5 Encounters:   01/19/24 60.1 kg (132 lb 7.9 oz)   12/29/23 61.5 kg (135 lb 9.3 oz)   12/27/23 61.5 kg (135 lb 8 oz)   12/12/23 60.2 kg (132 lb 12.8 oz)   12/01/23 60.2 kg (132 lb 11.5 oz)      Performance Status:  Asymptomatic - ECOG 0     Physical Exam  Vitals reviewed.    Constitutional:       Appearance: Normal appearance.   HENT:      Head: Normocephalic and atraumatic.      Nose: Nose normal.      Mouth/Throat:      Mouth: Mucous membranes are moist.      Pharynx: Oropharynx is clear.   Eyes:      Extraocular Movements: Extraocular movements intact.      Conjunctiva/sclera: Conjunctivae normal.      Pupils: Pupils are equal, round, and reactive to light.   Cardiovascular:      Rate and Rhythm: Normal rate and regular rhythm.      Pulses: Normal pulses.      Heart sounds: Normal heart sounds.   Pulmonary:      Effort: Pulmonary effort is normal.      Breath sounds: Normal breath sounds.   Abdominal:      General: Bowel sounds are normal. There is no distension.      Palpations: Abdomen is soft.   Musculoskeletal:         General: Normal range of motion.      Cervical back: Normal range of motion and neck supple.   Skin:     General: Skin is warm and dry.      Capillary Refill: Capillary refill takes less than 2 seconds.   Neurological:      General: No focal deficit present.      Mental Status: She is alert and oriented to person, place, and time.   Psychiatric:         Mood and Affect: Mood normal.         Behavior: Behavior normal.         Thought Content: Thought content normal.         Judgment: Judgment normal.        Results:  Labs:  Lab Results   Component Value Date    WBC 6.4 01/18/2024    HGB 13.3 01/18/2024    HCT 41.5 01/18/2024    MCV 95 01/18/2024     01/18/2024      Lab Results   Component Value Date    NEUTROABS 3.54 01/18/2024      Lab Results   Component Value Date    GLUCOSE 109 (H) 01/18/2024    CALCIUM 9.4 01/18/2024     01/18/2024    K 3.7 01/18/2024    CO2 26 01/18/2024     01/18/2024    BUN 12 01/18/2024    CREATININE 0.75 01/18/2024    MG 1.66 10/09/2023     Lab Results   Component Value Date    ALT 14 01/18/2024    AST 16 01/18/2024    ALKPHOS 79 01/18/2024    BILITOT 0.7 01/18/2024      Imaging:   No new imaging.    Pathology:  No new  pathology    Assessment/Plan      Dora Dueñas is a 63 y.o. female here for follow up of stage IIb lung adenocarcinoma with PD-L1 60% and no actionable mutations on NGS, s/p adjuvant chemotherapy after RUL segmentectomy.    She tolerated adjuvant chemotherapy well with progressive fatigue after the 4th cycle. Here today to start adjuvant immunotherapy. Discussed logistics and side effects of atezolizumab including but not limited to risk of immune-related AEs - most commonly thyroiditis, rash, colitis, fatigue but also rare risk of pneumonitis, myocarditis, nephritis, hepatitis, and other endocrine or neurologic AEs.     #Stage IIb lung adenocarcinoma -   - Proceed with adjuvant atezolizumab today, plan up to 1 year of treatment (17 doses)  - Consent signed 11/10  - Plan CT Chest with IV contrast q6m for now (last scan 11/2/23)  - Reports she is comfortable with her decision to receive immunotherapy.     #Mucositis - due to chemotherapy  - resolved     # Hypokalemia  - Improved, likely from chemo, ongoing monitor  - resolved     # Hyponatremia (chronic) - at baseline  - Pt asymptomatic  - Will continue to monitor     RTC in 3 weeks for C5 atezolizumab, patient encouraged to call for any concerns/questions.

## 2024-02-06 ENCOUNTER — NURSE TRIAGE (OUTPATIENT)
Dept: ADMISSION | Facility: HOSPITAL | Age: 64
End: 2024-02-06
Payer: MEDICARE

## 2024-02-06 ENCOUNTER — APPOINTMENT (OUTPATIENT)
Dept: RADIOLOGY | Facility: HOSPITAL | Age: 64
End: 2024-02-06
Payer: MEDICARE

## 2024-02-06 ENCOUNTER — HOSPITAL ENCOUNTER (EMERGENCY)
Facility: HOSPITAL | Age: 64
Discharge: HOME | End: 2024-02-06
Payer: MEDICARE

## 2024-02-06 VITALS
TEMPERATURE: 98.2 F | OXYGEN SATURATION: 97 % | WEIGHT: 132 LBS | DIASTOLIC BLOOD PRESSURE: 68 MMHG | SYSTOLIC BLOOD PRESSURE: 132 MMHG | HEIGHT: 62 IN | BODY MASS INDEX: 24.29 KG/M2 | HEART RATE: 78 BPM | RESPIRATION RATE: 18 BRPM

## 2024-02-06 DIAGNOSIS — R05.1 ACUTE COUGH: Primary | ICD-10-CM

## 2024-02-06 DIAGNOSIS — R03.0 ELEVATED BLOOD PRESSURE READING: ICD-10-CM

## 2024-02-06 LAB
ALBUMIN SERPL BCP-MCNC: 4.7 G/DL (ref 3.4–5)
ALP SERPL-CCNC: 70 U/L (ref 33–136)
ALT SERPL W P-5'-P-CCNC: 13 U/L (ref 7–45)
AMORPH CRY #/AREA UR COMP ASSIST: ABNORMAL /HPF
ANION GAP SERPL CALC-SCNC: 14 MMOL/L (ref 10–20)
APPEARANCE UR: CLEAR
AST SERPL W P-5'-P-CCNC: 16 U/L (ref 9–39)
BACTERIA #/AREA URNS AUTO: ABNORMAL /HPF
BASE EXCESS BLDV CALC-SCNC: 2.5 MMOL/L (ref -2–3)
BASOPHILS # BLD AUTO: 0.06 X10*3/UL (ref 0–0.1)
BASOPHILS NFR BLD AUTO: 0.8 %
BILIRUB SERPL-MCNC: 0.6 MG/DL (ref 0–1.2)
BILIRUB UR STRIP.AUTO-MCNC: NEGATIVE MG/DL
BNP SERPL-MCNC: 9 PG/ML (ref 0–99)
BODY TEMPERATURE: 37 DEGREES CELSIUS
BUN SERPL-MCNC: 17 MG/DL (ref 6–23)
CALCIUM SERPL-MCNC: 9.6 MG/DL (ref 8.6–10.3)
CARDIAC TROPONIN I PNL SERPL HS: 3 NG/L (ref 0–13)
CHLORIDE SERPL-SCNC: 97 MMOL/L (ref 98–107)
CO2 SERPL-SCNC: 26 MMOL/L (ref 21–32)
COLOR UR: ABNORMAL
CREAT SERPL-MCNC: 0.84 MG/DL (ref 0.5–1.05)
EGFRCR SERPLBLD CKD-EPI 2021: 77 ML/MIN/1.73M*2
EOSINOPHIL # BLD AUTO: 0.09 X10*3/UL (ref 0–0.7)
EOSINOPHIL NFR BLD AUTO: 1.2 %
ERYTHROCYTE [DISTWIDTH] IN BLOOD BY AUTOMATED COUNT: 12.2 % (ref 11.5–14.5)
FLUAV RNA RESP QL NAA+PROBE: NOT DETECTED
FLUBV RNA RESP QL NAA+PROBE: NOT DETECTED
GLUCOSE SERPL-MCNC: 87 MG/DL (ref 74–99)
GLUCOSE UR STRIP.AUTO-MCNC: NEGATIVE MG/DL
HCO3 BLDV-SCNC: 27.9 MMOL/L (ref 22–26)
HCT VFR BLD AUTO: 42.2 % (ref 36–46)
HGB BLD-MCNC: 13.8 G/DL (ref 12–16)
IMM GRANULOCYTES # BLD AUTO: 0.03 X10*3/UL (ref 0–0.7)
IMM GRANULOCYTES NFR BLD AUTO: 0.4 % (ref 0–0.9)
INHALED O2 CONCENTRATION: 21 %
KETONES UR STRIP.AUTO-MCNC: NEGATIVE MG/DL
LEUKOCYTE ESTERASE UR QL STRIP.AUTO: NEGATIVE
LYMPHOCYTES # BLD AUTO: 2.92 X10*3/UL (ref 1.2–4.8)
LYMPHOCYTES NFR BLD AUTO: 37.8 %
MAGNESIUM SERPL-MCNC: 1.76 MG/DL (ref 1.6–2.4)
MCH RBC QN AUTO: 30.6 PG (ref 26–34)
MCHC RBC AUTO-ENTMCNC: 32.7 G/DL (ref 32–36)
MCV RBC AUTO: 94 FL (ref 80–100)
MONOCYTES # BLD AUTO: 0.67 X10*3/UL (ref 0.1–1)
MONOCYTES NFR BLD AUTO: 8.7 %
NEUTROPHILS # BLD AUTO: 3.96 X10*3/UL (ref 1.2–7.7)
NEUTROPHILS NFR BLD AUTO: 51.1 %
NITRITE UR QL STRIP.AUTO: NEGATIVE
NRBC BLD-RTO: 0 /100 WBCS (ref 0–0)
OXYHGB MFR BLDV: 41.2 % (ref 45–75)
PCO2 BLDV: 45 MM HG (ref 41–51)
PH BLDV: 7.4 PH (ref 7.33–7.43)
PH UR STRIP.AUTO: 6 [PH]
PLATELET # BLD AUTO: 299 X10*3/UL (ref 150–450)
PO2 BLDV: 29 MM HG (ref 35–45)
POTASSIUM SERPL-SCNC: 3.6 MMOL/L (ref 3.5–5.3)
PROT SERPL-MCNC: 7.9 G/DL (ref 6.4–8.2)
PROT UR STRIP.AUTO-MCNC: NEGATIVE MG/DL
RBC # BLD AUTO: 4.51 X10*6/UL (ref 4–5.2)
RBC # UR STRIP.AUTO: ABNORMAL /UL
RBC #/AREA URNS AUTO: ABNORMAL /HPF
SAO2 % BLDV: 44 % (ref 45–75)
SARS-COV-2 RNA RESP QL NAA+PROBE: NOT DETECTED
SODIUM SERPL-SCNC: 133 MMOL/L (ref 136–145)
SP GR UR STRIP.AUTO: 1
UROBILINOGEN UR STRIP.AUTO-MCNC: <2 MG/DL
WBC # BLD AUTO: 7.7 X10*3/UL (ref 4.4–11.3)
WBC #/AREA URNS AUTO: ABNORMAL /HPF

## 2024-02-06 PROCEDURE — 99284 EMERGENCY DEPT VISIT MOD MDM: CPT | Mod: 25

## 2024-02-06 PROCEDURE — 71046 X-RAY EXAM CHEST 2 VIEWS: CPT

## 2024-02-06 PROCEDURE — 71046 X-RAY EXAM CHEST 2 VIEWS: CPT | Performed by: STUDENT IN AN ORGANIZED HEALTH CARE EDUCATION/TRAINING PROGRAM

## 2024-02-06 PROCEDURE — 71275 CT ANGIOGRAPHY CHEST: CPT

## 2024-02-06 PROCEDURE — 2500000001 HC RX 250 WO HCPCS SELF ADMINISTERED DRUGS (ALT 637 FOR MEDICARE OP)

## 2024-02-06 PROCEDURE — 36415 COLL VENOUS BLD VENIPUNCTURE: CPT | Performed by: NURSE PRACTITIONER

## 2024-02-06 PROCEDURE — 85025 COMPLETE CBC W/AUTO DIFF WBC: CPT | Performed by: NURSE PRACTITIONER

## 2024-02-06 PROCEDURE — 81001 URINALYSIS AUTO W/SCOPE: CPT | Performed by: NURSE PRACTITIONER

## 2024-02-06 PROCEDURE — 83880 ASSAY OF NATRIURETIC PEPTIDE: CPT | Performed by: NURSE PRACTITIONER

## 2024-02-06 PROCEDURE — 84484 ASSAY OF TROPONIN QUANT: CPT | Performed by: NURSE PRACTITIONER

## 2024-02-06 PROCEDURE — 83735 ASSAY OF MAGNESIUM: CPT | Performed by: NURSE PRACTITIONER

## 2024-02-06 PROCEDURE — 87636 SARSCOV2 & INF A&B AMP PRB: CPT | Performed by: NURSE PRACTITIONER

## 2024-02-06 PROCEDURE — 82805 BLOOD GASES W/O2 SATURATION: CPT | Performed by: NURSE PRACTITIONER

## 2024-02-06 PROCEDURE — 2550000001 HC RX 255 CONTRASTS: Performed by: NURSE PRACTITIONER

## 2024-02-06 PROCEDURE — 71275 CT ANGIOGRAPHY CHEST: CPT | Performed by: RADIOLOGY

## 2024-02-06 PROCEDURE — 80053 COMPREHEN METABOLIC PANEL: CPT | Performed by: NURSE PRACTITIONER

## 2024-02-06 RX ORDER — BENZONATATE 100 MG/1
100 CAPSULE ORAL ONCE
Status: COMPLETED | OUTPATIENT
Start: 2024-02-06 | End: 2024-02-06

## 2024-02-06 RX ORDER — BENZONATATE 100 MG/1
100 CAPSULE ORAL 3 TIMES DAILY PRN
Qty: 21 CAPSULE | Refills: 0 | Status: SHIPPED | OUTPATIENT
Start: 2024-02-06 | End: 2024-02-13

## 2024-02-06 RX ADMIN — BENZONATATE 100 MG: 100 CAPSULE ORAL at 21:11

## 2024-02-06 RX ADMIN — IOHEXOL 75 ML: 350 INJECTION, SOLUTION INTRAVENOUS at 18:57

## 2024-02-06 ASSESSMENT — LIFESTYLE VARIABLES
HAVE YOU EVER FELT YOU SHOULD CUT DOWN ON YOUR DRINKING: NO
EVER HAD A DRINK FIRST THING IN THE MORNING TO STEADY YOUR NERVES TO GET RID OF A HANGOVER: NO
EVER FELT BAD OR GUILTY ABOUT YOUR DRINKING: NO
HAVE PEOPLE ANNOYED YOU BY CRITICIZING YOUR DRINKING: NO

## 2024-02-06 ASSESSMENT — PAIN - FUNCTIONAL ASSESSMENT: PAIN_FUNCTIONAL_ASSESSMENT: 0-10

## 2024-02-06 ASSESSMENT — COLUMBIA-SUICIDE SEVERITY RATING SCALE - C-SSRS
6. HAVE YOU EVER DONE ANYTHING, STARTED TO DO ANYTHING, OR PREPARED TO DO ANYTHING TO END YOUR LIFE?: NO
1. IN THE PAST MONTH, HAVE YOU WISHED YOU WERE DEAD OR WISHED YOU COULD GO TO SLEEP AND NOT WAKE UP?: NO
2. HAVE YOU ACTUALLY HAD ANY THOUGHTS OF KILLING YOURSELF?: NO

## 2024-02-06 NOTE — TELEPHONE ENCOUNTER
Per the team, they are recommending ER evaluation. I called Dora back and let her know, she was reluctant however agreeable.

## 2024-02-06 NOTE — ED TRIAGE NOTES
PT. STATES SENT BY ONCOLOGIST FOR COUGH FOR PAST WEEK. DENIES SOB. PT. STATES GOING THROUGH IMMUNOTHERAPY FOR LUNG CA. DENIES FEVERS. PT. ALSO C/O INCREASED URINATION.

## 2024-02-06 NOTE — TELEPHONE ENCOUNTER
The patient had left a  regarding symptoms, stated she was SOB and a cough    Last immunotherapy on 1/19, I called the patient back, she states that she developed this wet cough about a week ago, denies fever or body aches. Reports that it came out of nowhere, she has not taken any OTC agents for the cough, reports a few days later she started having SOB with exertion, she is now at the point where she really doesn't even want to go downstairs to do launry due to not wanting to be winded. She was supposed to have immunotherapy this Friday but had to cancel it due to her sons school cancelling. Has no apt or further apts scheduled at this time related to her immunotherapy.     Also states that around the same time she developed the cough, she noticed urinary symptoms of urgency, states that she has the urge to go then only a little bit of urine comes out. Denies pain with urination though. Message routed to the team and secure chat sent      Additional Information   Is coughing constant?     States it is very exhausting   Negative: Is your chronic cough lasting more than 2 to 3 weeks?     Has been coughing for a week now   Commented on: What helps these problems?     Has not taken anything for the cough    Protocols used: Cough

## 2024-02-06 NOTE — ED TRIAGE NOTES
Secondary to patient volumes and overcrowding, I performed a brief medical screening exam of the patient in triage, as the patient awaits space in the main ED.    History of Present Illness:  Dora Dueñas presents with   Chief Complaint   Patient presents with    Cough     PT. STATES SENT BY ONCOLOGIST FOR COUGH FOR PAST WEEK. DENIES SOB. PT. STATES GOING THROUGH IMMUNOTHERAPY FOR LUNG CA. DENIES FEVERS. PT. ALSO C/O INCREASED URINATION.        Physical Exam:  General - In no acute distress  Respiratory - Breathing comfortably, diminished bases  Neurologic - Moving all extremities      Medical Decision Making:  Patient will require further evaluation in the main ED.    Initial diagnostic tests were ordered from triage.    The patient demonstrates understanding that this initial evaluation is a brief medical screening exam and the expectation is that they await for space in the main ED to be further evaluated.  The patient understands that, if they leave prior to further evaluation in the main ED after this initial evaluation in triage, they are doing so under their own accord knowing that their evaluation/work-up is not yet complete. The patient also understands that any preliminary diagnostic results, including abnormalities, may not be shared with them, if they choose to leave prior to further evaluation in the main ED.

## 2024-02-07 NOTE — DISCHARGE INSTRUCTIONS
You were seen in the emergency room today for a cough.  Extensive laboratory and diagnostic workup was completed as you have a history of immunosuppression.  Labs and imaging were essentially unremarkable.  All PCR swabs for COVID, RSV, influenza were negative.  You will be sent home with a prescription for benzonatate, an anticough medication.  I also recommend humidifier at night.  Reasons to return to the emergency room include chest pain, shortness of breath, and blood in your sputum when you cough.

## 2024-02-07 NOTE — ED PROVIDER NOTES
HPI   Chief Complaint   Patient presents with    Cough     PT. STATES SENT BY ONCOLOGIST FOR COUGH FOR PAST WEEK. DENIES SOB. PT. STATES GOING THROUGH IMMUNOTHERAPY FOR LUNG CA. DENIES FEVERS. PT. ALSO C/O INCREASED URINATION.        Dora is a 63-year-old female with past medical history of lung cancer undergoing immunotherapy presenting to the emergency department for a cough.  Her cough started several days ago and is productive with yellow/green sputum.  She follows with [facility] for her immunotherapy and when she told her physician there about her cough she was instructed to come to the emergency room.  Patient says when she coughs, she does feel slightly short of breath.  Denies hemoptysis and chest pain.  No history of DVT/PE, recent travel/trauma/surgery, and blood thinner use.  Patient is not experiencing any nausea, vomiting, abdominal pain, lightheadedness, dizziness, weakness, fever.                          Ole Coma Scale Score: 15                     Patient History   Past Medical History:   Diagnosis Date    Acute bronchitis, unspecified 11/17/2013    Acute bronchitis    Acute cystitis with hematuria 08/08/2021    Acute cystitis with hematuria    Acute sinusitis, unspecified 11/17/2013    Acute sinusitis    Acute upper respiratory infection, unspecified 11/17/2013    Upper respiratory infection    Anxiety disorder, unspecified 11/17/2013    Anxiety disorder    Anxiety disorder, unspecified 03/30/2021    Anxiety and depression    Cancer (CMS/Formerly [facility])     Colon, diverticulosis 03/30/2022    Dislocation of metacarpophalangeal joint of unspecified finger, initial encounter 04/03/2019    Closed dislocation of MCP joint of hand, initial encounter    Effusion, right hand 04/04/2019    Effusion of right hand    Follicular disorder, unspecified 11/17/2013    Folliculitis    Localized swelling, mass and lump, neck 06/10/2019    Mass of left side of neck    Nondisplaced fracture of neck of fourth  metacarpal bone, right hand, initial encounter for closed fracture 02/26/2019    Closed nondisplaced fracture of neck of fourth metacarpal bone of right hand, initial encounter    Nondisplaced fracture of neck of fourth metacarpal bone, right hand, subsequent encounter for fracture with routine healing 04/30/2019    Closed nondisplaced fracture of neck of fourth metacarpal bone of right hand with routine healing    Other conditions influencing health status 11/17/2013    Lump Or Swelling In The Neck    Other fatigue 11/17/2013    Fatigue    Other general symptoms and signs 01/09/2022    Flu-like symptoms    Other general symptoms and signs 08/26/2020    Flu-like symptoms    Other long term (current) drug therapy 02/21/2015    Long-term use of Plaquenil    Other specified soft tissue disorders 06/28/2019    Soft tissue mass    Personal history of other diseases of the female genital tract 08/04/2021    History of vaginal pruritus    Personal history of other diseases of the female genital tract 08/18/2014    History of senile atrophic vaginitis    Personal history of other diseases of the respiratory system 06/28/2019    History of acute bronchitis with bronchospasm    Personal history of other diseases of the respiratory system 06/28/2019    History of sinusitis    Personal history of other infectious and parasitic diseases 09/08/2021    History of candidiasis of vagina    Personal history of other mental and behavioral disorders 06/28/2019    History of depression    Pneumonia, unspecified organism 11/17/2013    Pneumonitis    Pure hypercholesterolemia, unspecified 11/17/2013    Low-density-lipoid-type (LDL) hyperlipoproteinemia    Unspecified lump in unspecified breast     Benign breast lumps    Urinary frequency 06/27/2023     Past Surgical History:   Procedure Laterality Date    CT GUIDED PERCUTANEOUS BIOPSY LUNG  4/24/2023    CT GUIDED PERCUTANEOUS BIOPSY LUNG PAR CT    OTHER SURGICAL HISTORY  06/28/2019     Bunion Correction By Double Osteotomy    OTHER SURGICAL HISTORY  06/28/2019    Breast Lumpectomy Location    OTHER SURGICAL HISTORY  03/30/2021    Colonoscopy     Family History   Problem Relation Name Age of Onset    Diabetes type II Mother      Osteoporosis Mother      Rheum arthritis Mother      Stroke Mother      Diabetes type II Father      Heart disease Father      Gout Father      Hypertension Father      Lung cancer Paternal Grandfather       Social History     Tobacco Use    Smoking status: Some Days     Packs/day: 0.25     Years: 20.00     Additional pack years: 0.00     Total pack years: 5.00     Types: Cigarettes    Smokeless tobacco: Never   Vaping Use    Vaping Use: Never used   Substance Use Topics    Alcohol use: Not Currently     Comment: SOCIALLY    Drug use: Never       Physical Exam   ED Triage Vitals [02/06/24 1631]   Temperature Heart Rate Respirations BP   36.7 °C (98.1 °F) 68 16 152/59      Pulse Ox Temp Source Heart Rate Source Patient Position   97 % Temporal Monitor Sitting      BP Location FiO2 (%)     Right arm --       Physical Exam  Constitutional:       Appearance: Normal appearance.   HENT:      Nose: No congestion.      Mouth/Throat:      Mouth: Mucous membranes are moist.      Pharynx: Oropharynx is clear.   Eyes:      Extraocular Movements: Extraocular movements intact.   Cardiovascular:      Rate and Rhythm: Normal rate and regular rhythm.      Pulses: Normal pulses.      Heart sounds: Normal heart sounds.   Pulmonary:      Effort: Pulmonary effort is normal. No respiratory distress.      Breath sounds: Normal breath sounds. No stridor. No wheezing, rhonchi or rales.   Abdominal:      General: Abdomen is flat. There is no distension.      Palpations: Abdomen is soft.      Tenderness: There is no abdominal tenderness. There is no guarding or rebound.   Skin:     General: Skin is warm and dry.   Neurological:      General: No focal deficit present.      Mental Status: She is alert.    Psychiatric:         Mood and Affect: Mood normal.         Behavior: Behavior normal.         ED Course & MDM   ED Course as of 02/06/24 2113   Tue Feb 06, 2024 1911 SODIUM(!): 133 []   1911 CHLORIDE(!): 97 []   2101 Coronavirus 2019, PCR: Not Detected []   2101 Flu A Result: Not Detected []   2101 Flu B Result: Not Detected []      ED Course User Index  [] Ginger Mcknight, PA-C         Diagnoses as of 02/06/24 2113   Acute cough   Elevated blood pressure reading       Medical Decision Making  Dora is a 63-year-old female with past medical history of lung cancer undergoing immunotherapy and wedge resection presenting to the emergency department for a productive cough and shortness of breath with coughing.  Denies hemoptysis, history of DVT/PE, blood thinner use, recent trauma/travel/surgery, and chest pain.  Patient instructed to come to the Emergency Department by her physicians at [facility].    Differentials include COVID, influenza, RSV, other viral illness, pulmonary embolism, pneumonia, etc.  Workup was initiated from triage and included CBC, CMP, magnesium, BNP, troponin, VBG, PCR swabs for COVID/influenza/RSV, UA, EKG, CT angio for pulmonary embolism, and chest x-ray.  Laboratory workup essentially unremarkable.  No white count on CBC.  Troponin and BNP normal.  All PCR swabs negative.  Urinalysis unremarkable for infection. EKG normal sinus rate and rhythm. CT angio no evidence of pulmonary embolus to the level of the segmental arteries. Small subsegmental filling defects can not be entirely excluded. Postsurgical changes of right upper lobe wedge resection with similar soft tissue thickening along the suture line, attention on follow-up. Mildly prominent mediastinal lymph nodes with a precarinal node measuring up to 0.9 cm, these are nonspecific.  Chest x-ray no acute cardiopulmonary process.     Discussed all results with the patient.  At this time, I believe her cough is  attributable to other viral illness.  She was treated with a Tessalon Perle in the emergency room today and had some improvement in her cough.  Patient given prescription for benzonatate.  We discussed reasons to return to the emergency department including fever, chest pain, worsening shortness of breath, and hemoptysis.  Patient was agreeable to plan.          Procedure  Procedures     Ginger Mcknight PA-C  02/06/24 1429

## 2024-02-09 ENCOUNTER — APPOINTMENT (OUTPATIENT)
Dept: HEMATOLOGY/ONCOLOGY | Facility: CLINIC | Age: 64
End: 2024-02-09
Payer: MEDICARE

## 2024-02-14 ENCOUNTER — TELEPHONE (OUTPATIENT)
Dept: HEMATOLOGY/ONCOLOGY | Facility: HOSPITAL | Age: 64
End: 2024-02-14
Payer: MEDICARE

## 2024-02-14 DIAGNOSIS — R35.0 INCREASED URINARY FREQUENCY: Primary | ICD-10-CM

## 2024-02-14 NOTE — TELEPHONE ENCOUNTER
"Check in with the patient. Had a \"horrible cough,\" its now subsided. Still have frequency with urination (even at night, not waking from sleep), no urgency, no pain, no incontinence. Getting bloodwork Thurs, wondering if need to repeat urine sample. Will add order to labs depending on Dr. Roy's recommendation. Patient aware and agreeable. No further needs at this time.   "

## 2024-02-15 ENCOUNTER — LAB (OUTPATIENT)
Dept: LAB | Facility: LAB | Age: 64
End: 2024-02-15
Payer: MEDICARE

## 2024-02-15 DIAGNOSIS — C34.11 PRIMARY MALIGNANT NEOPLASM OF RIGHT UPPER LOBE OF LUNG (MULTI): ICD-10-CM

## 2024-02-15 LAB
ALBUMIN SERPL BCP-MCNC: 4.8 G/DL (ref 3.4–5)
ALP SERPL-CCNC: 76 U/L (ref 33–136)
ALT SERPL W P-5'-P-CCNC: 18 U/L (ref 7–45)
ANION GAP SERPL CALC-SCNC: 16 MMOL/L (ref 10–20)
APPEARANCE UR: CLEAR
AST SERPL W P-5'-P-CCNC: 20 U/L (ref 9–39)
BASOPHILS # BLD AUTO: 0.09 X10*3/UL (ref 0–0.1)
BASOPHILS NFR BLD AUTO: 1 %
BILIRUB SERPL-MCNC: 0.5 MG/DL (ref 0–1.2)
BILIRUB UR STRIP.AUTO-MCNC: NEGATIVE MG/DL
BUN SERPL-MCNC: 16 MG/DL (ref 6–23)
CALCIUM SERPL-MCNC: 9.6 MG/DL (ref 8.6–10.6)
CHLORIDE SERPL-SCNC: 98 MMOL/L (ref 98–107)
CO2 SERPL-SCNC: 27 MMOL/L (ref 21–32)
COLOR UR: ABNORMAL
CORTIS AM PEAK SERPL-MSCNC: 20.2 UG/DL (ref 5–20)
CREAT SERPL-MCNC: 0.78 MG/DL (ref 0.5–1.05)
EGFRCR SERPLBLD CKD-EPI 2021: 84 ML/MIN/1.73M*2
EOSINOPHIL # BLD AUTO: 0.09 X10*3/UL (ref 0–0.7)
EOSINOPHIL NFR BLD AUTO: 1 %
ERYTHROCYTE [DISTWIDTH] IN BLOOD BY AUTOMATED COUNT: 12.2 % (ref 11.5–14.5)
GLUCOSE SERPL-MCNC: 84 MG/DL (ref 74–99)
GLUCOSE UR STRIP.AUTO-MCNC: NORMAL MG/DL
HCT VFR BLD AUTO: 41.9 % (ref 36–46)
HGB BLD-MCNC: 13.9 G/DL (ref 12–16)
HOLD SPECIMEN: NORMAL
IMM GRANULOCYTES # BLD AUTO: 0.03 X10*3/UL (ref 0–0.7)
IMM GRANULOCYTES NFR BLD AUTO: 0.3 % (ref 0–0.9)
KETONES UR STRIP.AUTO-MCNC: NEGATIVE MG/DL
LEUKOCYTE ESTERASE UR QL STRIP.AUTO: NEGATIVE
LYMPHOCYTES # BLD AUTO: 3.04 X10*3/UL (ref 1.2–4.8)
LYMPHOCYTES NFR BLD AUTO: 33 %
MCH RBC QN AUTO: 30.3 PG (ref 26–34)
MCHC RBC AUTO-ENTMCNC: 33.2 G/DL (ref 32–36)
MCV RBC AUTO: 91 FL (ref 80–100)
MONOCYTES # BLD AUTO: 0.51 X10*3/UL (ref 0.1–1)
MONOCYTES NFR BLD AUTO: 5.5 %
NEUTROPHILS # BLD AUTO: 5.46 X10*3/UL (ref 1.2–7.7)
NEUTROPHILS NFR BLD AUTO: 59.2 %
NITRITE UR QL STRIP.AUTO: NEGATIVE
NRBC BLD-RTO: 0 /100 WBCS (ref 0–0)
PH UR STRIP.AUTO: 6 [PH]
PLATELET # BLD AUTO: 273 X10*3/UL (ref 150–450)
POTASSIUM SERPL-SCNC: 3.9 MMOL/L (ref 3.5–5.3)
PROT SERPL-MCNC: 8.2 G/DL (ref 6.4–8.2)
PROT UR STRIP.AUTO-MCNC: NEGATIVE MG/DL
RBC # BLD AUTO: 4.59 X10*6/UL (ref 4–5.2)
RBC # UR STRIP.AUTO: ABNORMAL /UL
RBC #/AREA URNS AUTO: NORMAL /HPF
SODIUM SERPL-SCNC: 137 MMOL/L (ref 136–145)
SP GR UR STRIP.AUTO: 1.01
T4 FREE SERPL-MCNC: 0.97 NG/DL (ref 0.78–1.48)
TSH SERPL-ACNC: 4.67 MIU/L (ref 0.44–3.98)
UROBILINOGEN UR STRIP.AUTO-MCNC: NORMAL MG/DL
WBC # BLD AUTO: 9.2 X10*3/UL (ref 4.4–11.3)
WBC #/AREA URNS AUTO: NORMAL /HPF

## 2024-02-15 PROCEDURE — 36415 COLL VENOUS BLD VENIPUNCTURE: CPT

## 2024-02-15 PROCEDURE — 84443 ASSAY THYROID STIM HORMONE: CPT

## 2024-02-15 PROCEDURE — 82533 TOTAL CORTISOL: CPT

## 2024-02-15 PROCEDURE — 84439 ASSAY OF FREE THYROXINE: CPT

## 2024-02-15 PROCEDURE — 80053 COMPREHEN METABOLIC PANEL: CPT

## 2024-02-15 PROCEDURE — 85025 COMPLETE CBC W/AUTO DIFF WBC: CPT

## 2024-02-15 PROCEDURE — 81001 URINALYSIS AUTO W/SCOPE: CPT

## 2024-02-16 ENCOUNTER — OFFICE VISIT (OUTPATIENT)
Dept: HEMATOLOGY/ONCOLOGY | Facility: CLINIC | Age: 64
End: 2024-02-16
Payer: MEDICARE

## 2024-02-16 ENCOUNTER — INFUSION (OUTPATIENT)
Dept: HEMATOLOGY/ONCOLOGY | Facility: CLINIC | Age: 64
End: 2024-02-16
Payer: MEDICARE

## 2024-02-16 VITALS
HEART RATE: 58 BPM | OXYGEN SATURATION: 96 % | WEIGHT: 135.58 LBS | TEMPERATURE: 97.7 F | RESPIRATION RATE: 14 BRPM | SYSTOLIC BLOOD PRESSURE: 147 MMHG | BODY MASS INDEX: 24.8 KG/M2 | DIASTOLIC BLOOD PRESSURE: 85 MMHG

## 2024-02-16 DIAGNOSIS — R31.29 OTHER MICROSCOPIC HEMATURIA: ICD-10-CM

## 2024-02-16 DIAGNOSIS — C34.11 PRIMARY MALIGNANT NEOPLASM OF RIGHT UPPER LOBE OF LUNG (MULTI): Primary | ICD-10-CM

## 2024-02-16 DIAGNOSIS — R05.8 OTHER COUGH: ICD-10-CM

## 2024-02-16 DIAGNOSIS — Z51.12 ENCOUNTER FOR ANTINEOPLASTIC IMMUNOTHERAPY: ICD-10-CM

## 2024-02-16 DIAGNOSIS — C34.11 PRIMARY MALIGNANT NEOPLASM OF RIGHT UPPER LOBE OF LUNG (MULTI): ICD-10-CM

## 2024-02-16 PROBLEM — R05.9 COUGH: Status: ACTIVE | Noted: 2024-02-16

## 2024-02-16 PROCEDURE — 3079F DIAST BP 80-89 MM HG: CPT | Performed by: NURSE PRACTITIONER

## 2024-02-16 PROCEDURE — 1159F MED LIST DOCD IN RCRD: CPT | Performed by: NURSE PRACTITIONER

## 2024-02-16 PROCEDURE — 3077F SYST BP >= 140 MM HG: CPT | Performed by: NURSE PRACTITIONER

## 2024-02-16 PROCEDURE — 96413 CHEMO IV INFUSION 1 HR: CPT

## 2024-02-16 PROCEDURE — 2500000004 HC RX 250 GENERAL PHARMACY W/ HCPCS (ALT 636 FOR OP/ED): Mod: SE | Performed by: NURSE PRACTITIONER

## 2024-02-16 PROCEDURE — 99214 OFFICE O/P EST MOD 30 MIN: CPT | Performed by: NURSE PRACTITIONER

## 2024-02-16 PROCEDURE — 1126F AMNT PAIN NOTED NONE PRSNT: CPT | Performed by: NURSE PRACTITIONER

## 2024-02-16 PROCEDURE — 1157F ADVNC CARE PLAN IN RCRD: CPT | Performed by: NURSE PRACTITIONER

## 2024-02-16 RX ORDER — FAMOTIDINE 10 MG/ML
20 INJECTION INTRAVENOUS ONCE AS NEEDED
Status: DISCONTINUED | OUTPATIENT
Start: 2024-02-16 | End: 2024-02-16 | Stop reason: HOSPADM

## 2024-02-16 RX ORDER — DIPHENHYDRAMINE HYDROCHLORIDE 50 MG/ML
50 INJECTION INTRAMUSCULAR; INTRAVENOUS AS NEEDED
Status: CANCELLED | OUTPATIENT
Start: 2024-03-15

## 2024-02-16 RX ORDER — PROCHLORPERAZINE EDISYLATE 5 MG/ML
10 INJECTION INTRAMUSCULAR; INTRAVENOUS EVERY 6 HOURS PRN
Status: DISCONTINUED | OUTPATIENT
Start: 2024-02-16 | End: 2024-02-16 | Stop reason: HOSPADM

## 2024-02-16 RX ORDER — ALBUTEROL SULFATE 0.83 MG/ML
3 SOLUTION RESPIRATORY (INHALATION) AS NEEDED
Status: CANCELLED | OUTPATIENT
Start: 2024-03-15

## 2024-02-16 RX ORDER — PROCHLORPERAZINE MALEATE 5 MG
10 TABLET ORAL EVERY 6 HOURS PRN
Status: CANCELLED | OUTPATIENT
Start: 2024-03-15

## 2024-02-16 RX ORDER — FAMOTIDINE 10 MG/ML
20 INJECTION INTRAVENOUS ONCE AS NEEDED
Status: CANCELLED | OUTPATIENT
Start: 2024-03-15

## 2024-02-16 RX ORDER — PROCHLORPERAZINE MALEATE 10 MG
10 TABLET ORAL EVERY 6 HOURS PRN
Status: DISCONTINUED | OUTPATIENT
Start: 2024-02-16 | End: 2024-02-16 | Stop reason: HOSPADM

## 2024-02-16 RX ORDER — EPINEPHRINE 0.3 MG/.3ML
0.3 INJECTION SUBCUTANEOUS EVERY 5 MIN PRN
Status: DISCONTINUED | OUTPATIENT
Start: 2024-02-16 | End: 2024-02-16 | Stop reason: HOSPADM

## 2024-02-16 RX ORDER — EPINEPHRINE 0.3 MG/.3ML
0.3 INJECTION SUBCUTANEOUS EVERY 5 MIN PRN
Status: CANCELLED | OUTPATIENT
Start: 2024-03-15

## 2024-02-16 RX ORDER — ALBUTEROL SULFATE 0.83 MG/ML
3 SOLUTION RESPIRATORY (INHALATION) AS NEEDED
Status: DISCONTINUED | OUTPATIENT
Start: 2024-02-16 | End: 2024-02-16 | Stop reason: HOSPADM

## 2024-02-16 RX ORDER — PROCHLORPERAZINE EDISYLATE 5 MG/ML
10 INJECTION INTRAMUSCULAR; INTRAVENOUS EVERY 6 HOURS PRN
Status: CANCELLED | OUTPATIENT
Start: 2024-03-15

## 2024-02-16 RX ORDER — DIPHENHYDRAMINE HYDROCHLORIDE 50 MG/ML
50 INJECTION INTRAMUSCULAR; INTRAVENOUS AS NEEDED
Status: DISCONTINUED | OUTPATIENT
Start: 2024-02-16 | End: 2024-02-16 | Stop reason: HOSPADM

## 2024-02-16 RX ADMIN — ATEZOLIZUMAB 1200 MG: 1200 INJECTION, SOLUTION INTRAVENOUS at 10:21

## 2024-02-16 ASSESSMENT — ENCOUNTER SYMPTOMS
COUGH: 1
FREQUENCY: 1
FATIGUE: 1
SHORTNESS OF BREATH: 1

## 2024-02-16 ASSESSMENT — PAIN SCALES - GENERAL: PAINLEVEL: 0-NO PAIN

## 2024-02-16 NOTE — PROGRESS NOTES
Pt seen and assessed today by MILAGROS Goldberg NP.  Pt did not have any additional signs/symptoms, questions, or concerns today during infusion appointment.

## 2024-02-16 NOTE — PROGRESS NOTES
[facility] - Medical Oncology Follow-Up Visit    Patient ID: Dora Dueñas is a 63 y.o. female with stage IIb lung adenocarcinoma s/p RUL segmentectomy, with TP53 mutation and PD-L1 60%.     Current therapy: adjuvant atezolizumab     Chief Concern: Here for followup and treatment readiness visit    HPI: Patient present in clinic for follow-up and readiness to treat visit.  Feeling tired today.  Able to complete her activities.  Pt to ED on 2/6/24 for SOB and productive cough.  Work-up (-) for COVID, Flu, RSV, and PE.  UA (-) infection, 1+ blood.  Discharged with tessalon pearls Rx.  Dose taken once, no further admin.  Cough has improved.  Now intermittent, mainly at night 2/2 PND.  Nothing like it was.  Reports having these sx's once a year.  Believes she had what is going around.  Denies fevers, chills, or CP.  At time of ED visit, also having urinary frequency.  Still having but has lessened.  Feels that she has to go, but urinates less.  No recent UTI. Denies gross hematuria, incontinence, trauma, and dysuria.  Does not remember every being told she had blood in her urine.  Reduced intake at time of ED visit. Was on a coffee kick, started drinking 4 cups/day. Also iced tea, reduced water intake.  Discussed repeat UA reflect trace 0.03 blood in sample.  Amount trending down form +1.  She will follow-up with her PCP.  Son has same MD, with upcoming visit.  She will discuss with PCP at that time.  Appetite returning to her norm.  Wt is stable.  Denies n/v/c/d. Last BM this morning.  No rashes or skin concerns.  Labs WNL.  Ready for treatment today.     Diagnosis: Lung adenocarcinoma  Stage:  Cancer Staging   Primary malignant neoplasm of right upper lobe of lung (CMS/HCC)  Staging form: Lung, AJCC 8th Edition  - Pathologic: Stage IIB (pT1c, pN1, cM0) - Signed by Keena Roy MD on 9/25/2023     Current sites of disease: JULITO s/p RUL segmentectomy  Molecular and ancillary testing:    NGS with TP53 mutation  PD-L1 60%    Oncologic History:  1/11/23 - cardiac CT with RUL nodule  2/14/23 - CT chest with RUL nodule  3/17/23 - PET with FDG-avid RUL nodule no suspicious LNs  6/5/23 - RUL segmentectomy and LN dissection with pT1cN1 adenocarcinoma; 1 peribronchial LN positive for malignancy  8/7/23 -10/9/23 - adjuvant cisplatin/pemetrexed (completed 4 cycles)  11/10/23 - C1 adjuvant atezolizumab  2/6/24 - ED visit 2/2 SOB, increased cough (-) PE, work-up (-)    Oncology History   Primary malignant neoplasm of right upper lobe of lung (CMS/HCC)   6/12/2023 Initial Diagnosis    Primary malignant neoplasm of right upper lobe of lung (CMS/HCC)     8/7/2023 - 10/9/2023 Chemotherapy    PEMEtrexed / CISplatin, 21 Day Cycles - Thoracic      9/25/2023 Cancer Staged    Staging form: Lung, AJCC 8th Edition, Pathologic: Stage IIB (pT1c, pN1, cM0) - Signed by Keena Roy MD on 9/25/2023     11/10/2023 -  Chemotherapy    Atezolizumab, 21 Day Cycles       Past Medical/Surgical Hx:   HTN  HLD  osteoarthritis  osteoporosis     Social Hx:   +tobacco use 1ppd since age 23, has quit at times in the past including recently prior to surgery, recently started again. Laser therapy helped for 3m  Lives at home with adult son whom she cares for (has autism) and her adult daughter who is a big support   Lots of friends and community who support her as well     Family Hx:   Paternal grandfather had lung cancer - was a smoker    Meds (Current):    Current Outpatient Medications:     ALPRAZolam (Xanax) 0.5 mg tablet, Take 1 tablet (0.5 mg) by mouth if needed., Disp: , Rfl:     atorvastatin (Lipitor) 40 mg tablet, Take 1 tablet (40 mg) by mouth once daily., Disp: , Rfl:     buPROPion XL (Wellbutrin XL) 150 mg 24 hr tablet, TAKE ONE TABLET BY MOUTH IN THE MORNING AS DIRECTED, Disp: , Rfl:     cholecalciferol (Vitamin D-3) 25 MCG (1000 UT) capsule, Take 1 capsule (25 mcg) by mouth once daily., Disp: , Rfl:     escitalopram  (Lexapro) 20 mg tablet, TAKE TWO TABLETS BY MOUTH DAILY IN THE MORNING AS DIRECTED., Disp: , Rfl:     losartan (Cozaar) 100 mg tablet, TAKE ONE TABLET BY MOUTH EVERY DAY, Disp: 90 tablet, Rfl: 0  No current facility-administered medications for this visit.    Review of Systems   Constitutional:  Positive for fatigue.   Respiratory:  Positive for cough and shortness of breath.         Upper chest congestion (mild)   Gastrointestinal:         Food related, isolated incident    Genitourinary:  Positive for frequency.       Objective   BSA: 1.64 meters squared  /85 (BP Location: Left arm, Patient Position: Sitting)   Pulse 58   Temp 36.5 °C (97.7 °F) (Temporal)   Resp 14   Wt 61.5 kg (135 lb 9.3 oz)   SpO2 96%   BMI 24.80 kg/m²     Wt Readings from Last 5 Encounters:   02/16/24 61.5 kg (135 lb 9.3 oz)   02/06/24 59.9 kg (132 lb)   01/19/24 60.1 kg (132 lb 7.9 oz)   12/29/23 61.5 kg (135 lb 9.3 oz)   12/27/23 61.5 kg (135 lb 8 oz)      Performance Status:  Asymptomatic - ECOG 0     Physical Exam  Vitals reviewed.   Constitutional:       Appearance: Normal appearance.   HENT:      Head: Normocephalic and atraumatic.      Nose: Nose normal.      Mouth/Throat:      Mouth: Mucous membranes are moist.      Pharynx: Oropharynx is clear.   Eyes:      Extraocular Movements: Extraocular movements intact.      Conjunctiva/sclera: Conjunctivae normal.      Pupils: Pupils are equal, round, and reactive to light.   Cardiovascular:      Rate and Rhythm: Normal rate and regular rhythm.      Pulses: Normal pulses.      Heart sounds: Normal heart sounds.   Pulmonary:      Effort: Pulmonary effort is normal.      Breath sounds: Normal breath sounds.   Abdominal:      General: Bowel sounds are normal. There is no distension.      Palpations: Abdomen is soft.   Musculoskeletal:         General: Normal range of motion.      Cervical back: Normal range of motion and neck supple.   Skin:     General: Skin is warm and dry.       Capillary Refill: Capillary refill takes less than 2 seconds.   Neurological:      General: No focal deficit present.      Mental Status: She is alert and oriented to person, place, and time.   Psychiatric:         Mood and Affect: Mood normal.         Behavior: Behavior normal.         Thought Content: Thought content normal.         Judgment: Judgment normal.        Results:  Labs:  Lab Results   Component Value Date    WBC 9.2 02/15/2024    HGB 13.9 02/15/2024    HCT 41.9 02/15/2024    MCV 91 02/15/2024     02/15/2024      Lab Results   Component Value Date    NEUTROABS 5.46 02/15/2024      Lab Results   Component Value Date    GLUCOSE 84 02/15/2024    CALCIUM 9.6 02/15/2024     02/15/2024    K 3.9 02/15/2024    CO2 27 02/15/2024    CL 98 02/15/2024    BUN 16 02/15/2024    CREATININE 0.78 02/15/2024    MG 1.76 02/06/2024     Lab Results   Component Value Date    ALT 18 02/15/2024    AST 20 02/15/2024    ALKPHOS 76 02/15/2024    BILITOT 0.5 02/15/2024      Imaging:   No new imaging.    Pathology:  No new pathology    Assessment/Plan      Dora Dueñas is a 63 y.o. female here for follow up of stage IIb lung adenocarcinoma with PD-L1 60% and no actionable mutations on NGS, s/p adjuvant chemotherapy after RUL segmentectomy.    She tolerated adjuvant chemotherapy well with progressive fatigue after the 4th cycle. Here today to start adjuvant immunotherapy. Discussed logistics and side effects of atezolizumab including but not limited to risk of immune-related AEs - most commonly thyroiditis, rash, colitis, fatigue but also rare risk of pneumonitis, myocarditis, nephritis, hepatitis, and other endocrine or neurologic AEs.     #Stage IIb lung adenocarcinoma -   - Proceed with adjuvant atezolizumab today, plan up to 1 year of treatment (17 doses)  - Consent signed 11/10  - Plan CT Chest with IV contrast q6m for now (last scan 11/2/23)  - Reports she is comfortable with her decision to receive  immunotherapy.     #Mucositis - due to chemotherapy  - resolved     # Hypokalemia  - Improved, likely from chemo, ongoing monitor  - resolved     # Hyponatremia (chronic) - at baseline  - Pt asymptomatic  - resolved    # Cough - improved  - Likely viral   - Tessalon Pearls taken once, did not see benefit  - Prefers not to take additional medications  - Supportive care   - Will continue to monitor    # Urinary frequency/trace hematuria 1+ -> (repeat UA) 0.03.  - Frequency symptom resolving  - Denies dysuria, trauma, recent UTI(s)  - No gross hematuria  - No hx of microscopic hematuria  - Will follow-up with her PCP    # Hypothyroidism/elevated TSH/normal FT4  - Will monitor for now    - Repeat labs Q6wks        RTC in 3 weeks for C6 atezolizumab, patient encouraged to call for any concerns/questions.

## 2024-02-16 NOTE — SIGNIFICANT EVENT
02/16/24 0945   Prechemo Checklist   Has the patient been in the hospital, ED, or urgent care since last date of service No   Chemo/Immuno Consent Signed Yes   Protocol/Indications Verified Yes   Confirmed to previous date/time of medication Yes   Compared to previous dose Yes   All medications are dated accurately Yes   Pregnancy Test Negative Not applicable   Parameters Met Yes   BSA/Weight-Height Verified Yes   Dose Calculations Verified Yes

## 2024-02-19 ENCOUNTER — TELEPHONE (OUTPATIENT)
Dept: HEMATOLOGY/ONCOLOGY | Facility: HOSPITAL | Age: 64
End: 2024-02-19
Payer: MEDICARE

## 2024-02-19 NOTE — TELEPHONE ENCOUNTER
Pt called to inquire about infusion appt schedule. Pt last treatment on 2/16, pt made aware treatment plan states cycle given q21 days. Pt next infusion and provider visit scheduled for 3/8. Pt verbalized understanding and agrees to plan.

## 2024-03-01 ENCOUNTER — APPOINTMENT (OUTPATIENT)
Dept: HEMATOLOGY/ONCOLOGY | Facility: CLINIC | Age: 64
End: 2024-03-01
Payer: MEDICARE

## 2024-03-06 ENCOUNTER — APPOINTMENT (OUTPATIENT)
Dept: PRIMARY CARE | Facility: CLINIC | Age: 64
End: 2024-03-06
Payer: MEDICARE

## 2024-03-07 ENCOUNTER — NURSE TRIAGE (OUTPATIENT)
Dept: HEMATOLOGY/ONCOLOGY | Facility: HOSPITAL | Age: 64
End: 2024-03-07
Payer: MEDICARE

## 2024-03-07 NOTE — TELEPHONE ENCOUNTER
Pt developed cough and congestion late last week.  Denies fevers, chest pain or difficulty breathing.  Cough is productive with yellow sputum.  She is fatigued which she relates to poor sleep from coughing but able to complete ADL's.  Has not tested for COVID.  She's questioning whether she should cancel her follow up and infusion tomorrow?    Additional Information   Has coughing or amount of mucous changed? Describe in comments     Reports mucus is decreasing   Negative: Are there daily activities that you're having trouble with such as getting dressed or making meals?     Tired due to poor sleep from coughing but able to complete ADL's   Negative: Are medicines or other measures helping the cough?     Tried mucinex but didn't find it helpful   Commented on: How long have your problems been going on?     Started 3/1 or 3/2    Protocols used: Cough

## 2024-03-07 NOTE — TELEPHONE ENCOUNTER
Per Dr. Roy, pt will take a COVID test.  If negative, she would still like pt to come to her appt tomorrow for exam but no treatment will be given.   Pt agreeable and I will contact her back later for COVID results.

## 2024-03-08 ENCOUNTER — TELEPHONE (OUTPATIENT)
Dept: HEMATOLOGY/ONCOLOGY | Facility: CLINIC | Age: 64
End: 2024-03-08

## 2024-03-08 ENCOUNTER — HOSPITAL ENCOUNTER (OUTPATIENT)
Dept: RADIOLOGY | Facility: CLINIC | Age: 64
Discharge: HOME | End: 2024-03-08
Payer: MEDICARE

## 2024-03-08 ENCOUNTER — OFFICE VISIT (OUTPATIENT)
Dept: HEMATOLOGY/ONCOLOGY | Facility: CLINIC | Age: 64
End: 2024-03-08
Payer: MEDICARE

## 2024-03-08 ENCOUNTER — APPOINTMENT (OUTPATIENT)
Dept: HEMATOLOGY/ONCOLOGY | Facility: CLINIC | Age: 64
End: 2024-03-08
Payer: MEDICARE

## 2024-03-08 VITALS
OXYGEN SATURATION: 95 % | TEMPERATURE: 97.9 F | RESPIRATION RATE: 14 BRPM | DIASTOLIC BLOOD PRESSURE: 76 MMHG | WEIGHT: 134.92 LBS | SYSTOLIC BLOOD PRESSURE: 144 MMHG | BODY MASS INDEX: 24.68 KG/M2 | HEART RATE: 68 BPM

## 2024-03-08 DIAGNOSIS — R05.8 COUGH PRODUCTIVE OF YELLOW SPUTUM: ICD-10-CM

## 2024-03-08 DIAGNOSIS — R06.02 SOB (SHORTNESS OF BREATH): Primary | ICD-10-CM

## 2024-03-08 DIAGNOSIS — C34.11 PRIMARY MALIGNANT NEOPLASM OF RIGHT UPPER LOBE OF LUNG (MULTI): ICD-10-CM

## 2024-03-08 DIAGNOSIS — R06.02 SOB (SHORTNESS OF BREATH): ICD-10-CM

## 2024-03-08 PROCEDURE — 1157F ADVNC CARE PLAN IN RCRD: CPT | Performed by: NURSE PRACTITIONER

## 2024-03-08 PROCEDURE — 71046 X-RAY EXAM CHEST 2 VIEWS: CPT | Performed by: RADIOLOGY

## 2024-03-08 PROCEDURE — 71046 X-RAY EXAM CHEST 2 VIEWS: CPT

## 2024-03-08 PROCEDURE — 99214 OFFICE O/P EST MOD 30 MIN: CPT | Performed by: NURSE PRACTITIONER

## 2024-03-08 PROCEDURE — 3078F DIAST BP <80 MM HG: CPT | Performed by: NURSE PRACTITIONER

## 2024-03-08 PROCEDURE — 3077F SYST BP >= 140 MM HG: CPT | Performed by: NURSE PRACTITIONER

## 2024-03-08 PROCEDURE — 1126F AMNT PAIN NOTED NONE PRSNT: CPT | Performed by: NURSE PRACTITIONER

## 2024-03-08 PROCEDURE — 1159F MED LIST DOCD IN RCRD: CPT | Performed by: NURSE PRACTITIONER

## 2024-03-08 RX ORDER — HYDROCODONE BITARTRATE AND HOMATROPINE METHYLBROMIDE ORAL SOLUTION 5; 1.5 MG/5ML; MG/5ML
5 LIQUID ORAL NIGHTLY PRN
Qty: 35 ML | Refills: 0 | Status: SHIPPED | OUTPATIENT
Start: 2024-03-08 | End: 2024-03-15

## 2024-03-08 RX ORDER — DIPHENHYDRAMINE HYDROCHLORIDE 50 MG/ML
50 INJECTION INTRAMUSCULAR; INTRAVENOUS AS NEEDED
Status: CANCELLED | OUTPATIENT
Start: 2024-03-29

## 2024-03-08 RX ORDER — PROCHLORPERAZINE EDISYLATE 5 MG/ML
10 INJECTION INTRAMUSCULAR; INTRAVENOUS EVERY 6 HOURS PRN
Status: CANCELLED | OUTPATIENT
Start: 2024-03-29

## 2024-03-08 RX ORDER — PROCHLORPERAZINE MALEATE 5 MG
10 TABLET ORAL EVERY 6 HOURS PRN
Status: CANCELLED | OUTPATIENT
Start: 2024-03-29

## 2024-03-08 RX ORDER — ALBUTEROL SULFATE 0.83 MG/ML
3 SOLUTION RESPIRATORY (INHALATION) AS NEEDED
Status: CANCELLED | OUTPATIENT
Start: 2024-03-29

## 2024-03-08 RX ORDER — AZITHROMYCIN 250 MG/1
250 TABLET, FILM COATED ORAL DAILY
Qty: 6 TABLET | Refills: 0 | Status: SHIPPED | OUTPATIENT
Start: 2024-03-08 | End: 2024-03-14

## 2024-03-08 RX ORDER — EPINEPHRINE 0.3 MG/.3ML
0.3 INJECTION SUBCUTANEOUS EVERY 5 MIN PRN
Status: CANCELLED | OUTPATIENT
Start: 2024-03-29

## 2024-03-08 RX ORDER — FAMOTIDINE 10 MG/ML
20 INJECTION INTRAVENOUS ONCE AS NEEDED
Status: CANCELLED | OUTPATIENT
Start: 2024-03-29

## 2024-03-08 ASSESSMENT — ENCOUNTER SYMPTOMS
SHORTNESS OF BREATH: 1
COUGH: 1
FEVER: 1
APPETITE CHANGE: 1
FATIGUE: 1
FREQUENCY: 0
DIARRHEA: 1

## 2024-03-08 ASSESSMENT — PAIN SCALES - GENERAL: PAINLEVEL: 0-NO PAIN

## 2024-03-08 NOTE — PROGRESS NOTES
[facility] - Medical Oncology Follow-Up Visit    Patient ID: Dora Dueñas is a 63 y.o. female with stage IIb lung adenocarcinoma s/p RUL segmentectomy, with TP53 mutation and PD-L1 60%.     Current therapy: adjuvant atezolizumab     Chief Concern: Here for sick visit - increase cough and SOB    HPI: Patient present in clinic for follow-up and sick visit.    Reports cough started last Friday.  Feeling congested, intermittent productive cough.  Sputum yellowish.  Home COVID test (-).  Mucinex ineffective. Taking OTC cold& flu medication.  Cough is keeping her awake at night.  2/6 ED visit - cough is different this time.  States she completely recovered from those symptoms.    Hx of sinusitis, bronchitis.  Symptoms similar to that.  +Sinus pressure.  Previous occurrence last October.  PCP started her on ATB (symptoms worse at that time).  Feeling hot and cold at night.  Low-grade temps 99.  In bed for last couple of days - did not shower for two days.  Decreased appetite, grazing.  Able to push through activities to care for her son.   Today feeling a little better.  Denies n/v/c.  Diarrhea x2 - 2 days ago.  Attributes to no solid foods.  No GI upset.  Staying hydrated.  Has not exercised this week.  Urinating fine.  Discussed we will hold today's treatment.      Diagnosis: Lung adenocarcinoma  Stage:  Cancer Staging   Primary malignant neoplasm of right upper lobe of lung (CMS/HCC)  Staging form: Lung, AJCC 8th Edition  - Pathologic: Stage IIB (pT1c, pN1, cM0) - Signed by Keena Roy MD on 9/25/2023     Current sites of disease: JULITO s/p RUL segmentectomy  Molecular and ancillary testing:   NGS with TP53 mutation  PD-L1 60%    Oncologic History:  1/11/23 - cardiac CT with RUL nodule  2/14/23 - CT chest with RUL nodule  3/17/23 - PET with FDG-avid RUL nodule no suspicious LNs  6/5/23 - RUL segmentectomy and LN dissection with pT1cN1 adenocarcinoma; 1 peribronchial LN positive for  malignancy  8/7/23 -10/9/23 - adjuvant cisplatin/pemetrexed (completed 4 cycles)  11/10/23 - C1 adjuvant atezolizumab  2/6/24 - ED visit 2/2 SOB, increased cough (-) PE, work-up (-)    Oncology History   Primary malignant neoplasm of right upper lobe of lung (CMS/HCC)   6/12/2023 Initial Diagnosis    Primary malignant neoplasm of right upper lobe of lung (CMS/HCC)     8/7/2023 - 10/9/2023 Chemotherapy    PEMEtrexed / CISplatin, 21 Day Cycles - Thoracic      9/25/2023 Cancer Staged    Staging form: Lung, AJCC 8th Edition, Pathologic: Stage IIB (pT1c, pN1, cM0) - Signed by Keena Roy MD on 9/25/2023     11/10/2023 -  Chemotherapy    Atezolizumab, 21 Day Cycles       Past Medical/Surgical Hx:   HTN  HLD  osteoarthritis  osteoporosis     Social Hx:   +tobacco use 1ppd since age 23, has quit at times in the past including recently prior to surgery, recently started again. Laser therapy helped for 3m  Lives at home with adult son whom she cares for (has autism) and her adult daughter who is a big support   Lots of friends and community who support her as well     Family Hx:   Paternal grandfather had lung cancer - was a smoker    Meds (Current):    Current Outpatient Medications:     ALPRAZolam (Xanax) 0.5 mg tablet, Take 1 tablet (0.5 mg) by mouth if needed., Disp: , Rfl:     atorvastatin (Lipitor) 40 mg tablet, Take 1 tablet (40 mg) by mouth once daily., Disp: , Rfl:     buPROPion XL (Wellbutrin XL) 150 mg 24 hr tablet, TAKE ONE TABLET BY MOUTH IN THE MORNING AS DIRECTED, Disp: , Rfl:     cholecalciferol (Vitamin D-3) 25 MCG (1000 UT) capsule, Take 1 capsule (25 mcg) by mouth once daily., Disp: , Rfl:     escitalopram (Lexapro) 20 mg tablet, TAKE TWO TABLETS BY MOUTH DAILY IN THE MORNING AS DIRECTED., Disp: , Rfl:     losartan (Cozaar) 100 mg tablet, TAKE ONE TABLET BY MOUTH EVERY DAY, Disp: 90 tablet, Rfl: 0    azithromycin (Zithromax Z-Nick) 250 mg tablet, Take 1 tablet (250 mg) by mouth once daily for 6 days.,  Disp: 6 tablet, Rfl: 0    hydrocodone-homatropine (Hycodan) 5-1.5 mg/5 mL syrup, Take 5 mL by mouth as needed at bedtime for cough for up to 7 days., Disp: 35 mL, Rfl: 0    Review of Systems   Constitutional:  Positive for appetite change, fatigue and fever.   Respiratory:  Positive for cough and shortness of breath.         Upper chest congestion (mild)  +Sinus pressure - maxillary sinus area to above eyes   Gastrointestinal:  Positive for diarrhea.        Food related, isolated incident    Genitourinary:  Negative for frequency.       Objective   BSA: 1.64 meters squared  /76 (BP Location: Left arm, Patient Position: Sitting)   Pulse 68   Temp 36.6 °C (97.9 °F) (Temporal)   Resp 14   Wt 61.2 kg (134 lb 14.7 oz)   SpO2 95%   BMI 24.68 kg/m²     Wt Readings from Last 5 Encounters:   03/08/24 61.2 kg (134 lb 14.7 oz)   02/16/24 61.5 kg (135 lb 9.3 oz)   02/06/24 59.9 kg (132 lb)   01/19/24 60.1 kg (132 lb 7.9 oz)   12/29/23 61.5 kg (135 lb 9.3 oz)      Performance Status:  Asymptomatic - ECOG 0     Physical Exam  Vitals reviewed.   Constitutional:       Appearance: Normal appearance.   HENT:      Head: Normocephalic and atraumatic.      Nose: Nose normal.      Mouth/Throat:      Mouth: Mucous membranes are moist.      Pharynx: Oropharynx is clear.   Eyes:      Extraocular Movements: Extraocular movements intact.      Conjunctiva/sclera: Conjunctivae normal.      Pupils: Pupils are equal, round, and reactive to light.   Cardiovascular:      Rate and Rhythm: Normal rate and regular rhythm.      Pulses: Normal pulses.      Heart sounds: Normal heart sounds.   Pulmonary:      Effort: Pulmonary effort is normal.      Breath sounds: Normal breath sounds.      Comments: Diminished Rul 2/2 wedge resection   Abdominal:      General: Bowel sounds are normal. There is no distension.      Palpations: Abdomen is soft.   Musculoskeletal:         General: Normal range of motion.      Cervical back: Normal range of  motion and neck supple.   Skin:     General: Skin is warm and dry.      Capillary Refill: Capillary refill takes less than 2 seconds.   Neurological:      General: No focal deficit present.      Mental Status: She is alert and oriented to person, place, and time.   Psychiatric:         Mood and Affect: Mood normal.         Behavior: Behavior normal.         Thought Content: Thought content normal.         Judgment: Judgment normal.        Results:  Labs:  Lab Results   Component Value Date    WBC 9.2 02/15/2024    HGB 13.9 02/15/2024    HCT 41.9 02/15/2024    MCV 91 02/15/2024     02/15/2024      Lab Results   Component Value Date    NEUTROABS 5.46 02/15/2024      Lab Results   Component Value Date    GLUCOSE 84 02/15/2024    CALCIUM 9.6 02/15/2024     02/15/2024    K 3.9 02/15/2024    CO2 27 02/15/2024    CL 98 02/15/2024    BUN 16 02/15/2024    CREATININE 0.78 02/15/2024    MG 1.76 02/06/2024     Lab Results   Component Value Date    ALT 18 02/15/2024    AST 20 02/15/2024    ALKPHOS 76 02/15/2024    BILITOT 0.5 02/15/2024      Imaging:   3/8/24 - XR Chest 2 view  1.  No active cardiopulmonary disease.  There has not been  significant interval change from the prior exam.    2/6/24 - Ct angio chest for PE    1. No evidence of pulmonary embolus to the level of the segmental  arteries. Small subsegmental filling defects can not be entirely  excluded.  2. Postsurgical changes of right upper lobe wedge resection with  similar soft tissue thickening along the suture line, attention on  follow-up.  3. Mildly prominent mediastinal lymph nodes with a precarinal node  measuring up to 0.9 cm, these are nonspecific.    Pathology:  No new pathology    Assessment/Plan      Dora Dueñas is a 63 y.o. female here for follow up of stage IIb lung adenocarcinoma with PD-L1 60% and no actionable mutations on NGS, s/p adjuvant chemotherapy after RUL segmentectomy.    She tolerated adjuvant chemotherapy well with  progressive fatigue after the 4th cycle. Here today to start adjuvant immunotherapy. Discussed logistics and side effects of atezolizumab including but not limited to risk of immune-related AEs - most commonly thyroiditis, rash, colitis, fatigue but also rare risk of pneumonitis, myocarditis, nephritis, hepatitis, and other endocrine or neurologic AEs.     #Stage IIb lung adenocarcinoma -   - Proceed with adjuvant atezolizumab today, plan up to 1 year of treatment (17 doses)  - Consent signed 11/10  - Plan CT Chest with IV contrast q6m for now (last scan 11/2/23)  - Reports she is comfortable with her decision to receive immunotherapy.   - Will hold scheduled 3/8 treatment, rescheduled to 3/15.    #Mucositis - due to chemotherapy  - resolved     # Hypokalemia  - Improved, likely from chemo, ongoing monitor  - resolved     # Hyponatremia (chronic) - at baseline  - Pt asymptomatic  - resolved    # Cough (2/6/24 ED visit) - improved  - Likely viral   - Tessalon Pearls taken once, did not see benefit  - Prefers not to take additional medications  - Supportive care   - Will continue to monitor    # Productive-cough (3/8 sick visit)  - Increased nocturnal cough  - intermittent with yellowish sputum  - Low grade temps - 99.  - Likely sinusitis, hx of   - STAT CXR- No acute findings  - Rx Z-Nick  - Hycodan Rx x7 days    # Urinary frequency/trace hematuria 1+ -> (repeat UA) 0.03.  - Frequency symptom resolving  - Denies dysuria, trauma, recent UTI(s)  - No gross hematuria  - No hx of microscopic hematuria  - Will follow-up with her PCP  - Resolved    # Hypothyroidism/elevated TSH/normal FT4  - Will monitor for now    - Repeat labs Q6wks      RTC in 1 weeks for C6 Atezolizumab.  If feeling better, will get labs on 3/14.  Patient encouraged to call for any concerns/questions.

## 2024-03-15 ENCOUNTER — APPOINTMENT (OUTPATIENT)
Dept: HEMATOLOGY/ONCOLOGY | Facility: CLINIC | Age: 64
End: 2024-03-15
Payer: MEDICARE

## 2024-03-21 ENCOUNTER — APPOINTMENT (OUTPATIENT)
Dept: HEMATOLOGY/ONCOLOGY | Facility: CLINIC | Age: 64
End: 2024-03-21
Payer: MEDICARE

## 2024-03-22 ENCOUNTER — APPOINTMENT (OUTPATIENT)
Dept: HEMATOLOGY/ONCOLOGY | Facility: CLINIC | Age: 64
End: 2024-03-22
Payer: MEDICARE

## 2024-03-28 ENCOUNTER — LAB (OUTPATIENT)
Dept: LAB | Facility: LAB | Age: 64
End: 2024-03-28
Payer: MEDICARE

## 2024-03-28 DIAGNOSIS — E78.01 FAMILIAL HYPERCHOLESTEROLEMIA: ICD-10-CM

## 2024-03-28 DIAGNOSIS — C34.11 PRIMARY MALIGNANT NEOPLASM OF RIGHT UPPER LOBE OF LUNG (MULTI): ICD-10-CM

## 2024-03-28 LAB
ALBUMIN SERPL BCP-MCNC: 4.4 G/DL (ref 3.4–5)
ALP SERPL-CCNC: 85 U/L (ref 33–136)
ALT SERPL W P-5'-P-CCNC: 23 U/L (ref 7–45)
ANION GAP SERPL CALC-SCNC: 13 MMOL/L (ref 10–20)
AST SERPL W P-5'-P-CCNC: 19 U/L (ref 9–39)
BASOPHILS # BLD AUTO: 0.08 X10*3/UL (ref 0–0.1)
BASOPHILS NFR BLD AUTO: 1.1 %
BILIRUB SERPL-MCNC: 0.7 MG/DL (ref 0–1.2)
BUN SERPL-MCNC: 16 MG/DL (ref 6–23)
CALCIUM SERPL-MCNC: 9.6 MG/DL (ref 8.6–10.6)
CHLORIDE SERPL-SCNC: 101 MMOL/L (ref 98–107)
CHOLEST SERPL-MCNC: 254 MG/DL (ref 0–199)
CHOLESTEROL/HDL RATIO: 4.2
CO2 SERPL-SCNC: 28 MMOL/L (ref 21–32)
CREAT SERPL-MCNC: 0.79 MG/DL (ref 0.5–1.05)
EGFRCR SERPLBLD CKD-EPI 2021: 83 ML/MIN/1.73M*2
EOSINOPHIL # BLD AUTO: 0.14 X10*3/UL (ref 0–0.7)
EOSINOPHIL NFR BLD AUTO: 1.9 %
ERYTHROCYTE [DISTWIDTH] IN BLOOD BY AUTOMATED COUNT: 13 % (ref 11.5–14.5)
GLUCOSE SERPL-MCNC: 102 MG/DL (ref 74–99)
HCT VFR BLD AUTO: 38.9 % (ref 36–46)
HDLC SERPL-MCNC: 60.9 MG/DL
HGB BLD-MCNC: 13.1 G/DL (ref 12–16)
IMM GRANULOCYTES # BLD AUTO: 0.03 X10*3/UL (ref 0–0.7)
IMM GRANULOCYTES NFR BLD AUTO: 0.4 % (ref 0–0.9)
LDLC SERPL CALC-MCNC: 154 MG/DL
LYMPHOCYTES # BLD AUTO: 2.4 X10*3/UL (ref 1.2–4.8)
LYMPHOCYTES NFR BLD AUTO: 33 %
MCH RBC QN AUTO: 30.9 PG (ref 26–34)
MCHC RBC AUTO-ENTMCNC: 33.7 G/DL (ref 32–36)
MCV RBC AUTO: 92 FL (ref 80–100)
MONOCYTES # BLD AUTO: 0.54 X10*3/UL (ref 0.1–1)
MONOCYTES NFR BLD AUTO: 7.4 %
NEUTROPHILS # BLD AUTO: 4.08 X10*3/UL (ref 1.2–7.7)
NEUTROPHILS NFR BLD AUTO: 56.2 %
NON HDL CHOLESTEROL: 193 MG/DL (ref 0–149)
NRBC BLD-RTO: 0 /100 WBCS (ref 0–0)
PLATELET # BLD AUTO: 258 X10*3/UL (ref 150–450)
POTASSIUM SERPL-SCNC: 3.8 MMOL/L (ref 3.5–5.3)
PROT SERPL-MCNC: 7.6 G/DL (ref 6.4–8.2)
RBC # BLD AUTO: 4.24 X10*6/UL (ref 4–5.2)
SODIUM SERPL-SCNC: 138 MMOL/L (ref 136–145)
TRIGL SERPL-MCNC: 196 MG/DL (ref 0–149)
VLDL: 39 MG/DL (ref 0–40)
WBC # BLD AUTO: 7.3 X10*3/UL (ref 4.4–11.3)

## 2024-03-28 PROCEDURE — 36415 COLL VENOUS BLD VENIPUNCTURE: CPT

## 2024-03-28 PROCEDURE — 80053 COMPREHEN METABOLIC PANEL: CPT

## 2024-03-28 PROCEDURE — 85025 COMPLETE CBC W/AUTO DIFF WBC: CPT

## 2024-03-28 PROCEDURE — 80061 LIPID PANEL: CPT

## 2024-03-29 ENCOUNTER — OFFICE VISIT (OUTPATIENT)
Dept: HEMATOLOGY/ONCOLOGY | Facility: CLINIC | Age: 64
End: 2024-03-29
Payer: MEDICARE

## 2024-03-29 ENCOUNTER — INFUSION (OUTPATIENT)
Dept: HEMATOLOGY/ONCOLOGY | Facility: CLINIC | Age: 64
End: 2024-03-29
Payer: MEDICARE

## 2024-03-29 VITALS
DIASTOLIC BLOOD PRESSURE: 84 MMHG | SYSTOLIC BLOOD PRESSURE: 139 MMHG | OXYGEN SATURATION: 97 % | TEMPERATURE: 98.2 F | WEIGHT: 134.92 LBS | BODY MASS INDEX: 24.68 KG/M2 | RESPIRATION RATE: 16 BRPM | HEART RATE: 71 BPM

## 2024-03-29 DIAGNOSIS — C34.11 PRIMARY MALIGNANT NEOPLASM OF RIGHT UPPER LOBE OF LUNG (MULTI): ICD-10-CM

## 2024-03-29 DIAGNOSIS — C34.11 PRIMARY MALIGNANT NEOPLASM OF RIGHT UPPER LOBE OF LUNG (MULTI): Primary | ICD-10-CM

## 2024-03-29 DIAGNOSIS — Z51.12 ENCOUNTER FOR ANTINEOPLASTIC IMMUNOTHERAPY: ICD-10-CM

## 2024-03-29 PROCEDURE — 1157F ADVNC CARE PLAN IN RCRD: CPT | Performed by: NURSE PRACTITIONER

## 2024-03-29 PROCEDURE — 99214 OFFICE O/P EST MOD 30 MIN: CPT | Performed by: NURSE PRACTITIONER

## 2024-03-29 PROCEDURE — 2500000004 HC RX 250 GENERAL PHARMACY W/ HCPCS (ALT 636 FOR OP/ED): Mod: JZ,JG,SE | Performed by: INTERNAL MEDICINE

## 2024-03-29 PROCEDURE — 3075F SYST BP GE 130 - 139MM HG: CPT | Performed by: NURSE PRACTITIONER

## 2024-03-29 PROCEDURE — 3079F DIAST BP 80-89 MM HG: CPT | Performed by: NURSE PRACTITIONER

## 2024-03-29 PROCEDURE — 96413 CHEMO IV INFUSION 1 HR: CPT

## 2024-03-29 PROCEDURE — 1126F AMNT PAIN NOTED NONE PRSNT: CPT | Performed by: NURSE PRACTITIONER

## 2024-03-29 PROCEDURE — 1159F MED LIST DOCD IN RCRD: CPT | Performed by: NURSE PRACTITIONER

## 2024-03-29 PROCEDURE — 96365 THER/PROPH/DIAG IV INF INIT: CPT | Mod: INF

## 2024-03-29 RX ORDER — ALBUTEROL SULFATE 0.83 MG/ML
3 SOLUTION RESPIRATORY (INHALATION) AS NEEDED
Status: DISCONTINUED | OUTPATIENT
Start: 2024-03-29 | End: 2024-03-29 | Stop reason: HOSPADM

## 2024-03-29 RX ORDER — FAMOTIDINE 10 MG/ML
20 INJECTION INTRAVENOUS ONCE AS NEEDED
Status: DISCONTINUED | OUTPATIENT
Start: 2024-03-29 | End: 2024-03-29 | Stop reason: HOSPADM

## 2024-03-29 RX ORDER — DIPHENHYDRAMINE HYDROCHLORIDE 50 MG/ML
50 INJECTION INTRAMUSCULAR; INTRAVENOUS AS NEEDED
Status: CANCELLED | OUTPATIENT
Start: 2024-03-29

## 2024-03-29 RX ORDER — PROCHLORPERAZINE EDISYLATE 5 MG/ML
10 INJECTION INTRAMUSCULAR; INTRAVENOUS EVERY 6 HOURS PRN
Status: DISCONTINUED | OUTPATIENT
Start: 2024-03-29 | End: 2024-03-29 | Stop reason: HOSPADM

## 2024-03-29 RX ORDER — ALBUTEROL SULFATE 0.83 MG/ML
3 SOLUTION RESPIRATORY (INHALATION) AS NEEDED
Status: CANCELLED | OUTPATIENT
Start: 2024-03-29

## 2024-03-29 RX ORDER — ALBUTEROL SULFATE 0.83 MG/ML
3 SOLUTION RESPIRATORY (INHALATION) AS NEEDED
OUTPATIENT
Start: 2024-04-25

## 2024-03-29 RX ORDER — PROCHLORPERAZINE EDISYLATE 5 MG/ML
10 INJECTION INTRAMUSCULAR; INTRAVENOUS EVERY 6 HOURS PRN
Status: CANCELLED | OUTPATIENT
Start: 2024-03-29

## 2024-03-29 RX ORDER — DIPHENHYDRAMINE HYDROCHLORIDE 50 MG/ML
50 INJECTION INTRAMUSCULAR; INTRAVENOUS AS NEEDED
OUTPATIENT
Start: 2024-04-25

## 2024-03-29 RX ORDER — EPINEPHRINE 0.3 MG/.3ML
0.3 INJECTION SUBCUTANEOUS EVERY 5 MIN PRN
OUTPATIENT
Start: 2024-04-25

## 2024-03-29 RX ORDER — EPINEPHRINE 0.3 MG/.3ML
0.3 INJECTION SUBCUTANEOUS EVERY 5 MIN PRN
Status: CANCELLED | OUTPATIENT
Start: 2024-03-29

## 2024-03-29 RX ORDER — PROCHLORPERAZINE EDISYLATE 5 MG/ML
10 INJECTION INTRAMUSCULAR; INTRAVENOUS EVERY 6 HOURS PRN
OUTPATIENT
Start: 2024-04-25

## 2024-03-29 RX ORDER — HEPARIN SODIUM,PORCINE/PF 10 UNIT/ML
50 SYRINGE (ML) INTRAVENOUS AS NEEDED
OUTPATIENT
Start: 2024-03-29

## 2024-03-29 RX ORDER — PROCHLORPERAZINE MALEATE 10 MG
10 TABLET ORAL EVERY 6 HOURS PRN
Status: DISCONTINUED | OUTPATIENT
Start: 2024-03-29 | End: 2024-03-29 | Stop reason: HOSPADM

## 2024-03-29 RX ORDER — HEPARIN 100 UNIT/ML
500 SYRINGE INTRAVENOUS AS NEEDED
OUTPATIENT
Start: 2024-03-29

## 2024-03-29 RX ORDER — FAMOTIDINE 10 MG/ML
20 INJECTION INTRAVENOUS ONCE AS NEEDED
OUTPATIENT
Start: 2024-04-25

## 2024-03-29 RX ORDER — PROCHLORPERAZINE MALEATE 5 MG
10 TABLET ORAL EVERY 6 HOURS PRN
OUTPATIENT
Start: 2024-04-25

## 2024-03-29 RX ORDER — DIPHENHYDRAMINE HYDROCHLORIDE 50 MG/ML
50 INJECTION INTRAMUSCULAR; INTRAVENOUS AS NEEDED
Status: DISCONTINUED | OUTPATIENT
Start: 2024-03-29 | End: 2024-03-29 | Stop reason: HOSPADM

## 2024-03-29 RX ORDER — PROCHLORPERAZINE MALEATE 5 MG
10 TABLET ORAL EVERY 6 HOURS PRN
Status: CANCELLED | OUTPATIENT
Start: 2024-03-29

## 2024-03-29 RX ORDER — EPINEPHRINE 0.3 MG/.3ML
0.3 INJECTION SUBCUTANEOUS EVERY 5 MIN PRN
Status: DISCONTINUED | OUTPATIENT
Start: 2024-03-29 | End: 2024-03-29 | Stop reason: HOSPADM

## 2024-03-29 RX ORDER — FAMOTIDINE 10 MG/ML
20 INJECTION INTRAVENOUS ONCE AS NEEDED
Status: CANCELLED | OUTPATIENT
Start: 2024-03-29

## 2024-03-29 RX ADMIN — ATEZOLIZUMAB 1200 MG: 1200 INJECTION, SOLUTION INTRAVENOUS at 11:38

## 2024-03-29 ASSESSMENT — ENCOUNTER SYMPTOMS
FATIGUE: 0
FEVER: 0
APPETITE CHANGE: 0
RESPIRATORY NEGATIVE: 1
CONSTITUTIONAL NEGATIVE: 1
SHORTNESS OF BREATH: 0
NEUROLOGICAL NEGATIVE: 1
PSYCHIATRIC NEGATIVE: 1
CONSTIPATION: 1
CARDIOVASCULAR NEGATIVE: 1
FREQUENCY: 0
MUSCULOSKELETAL NEGATIVE: 1
DIARRHEA: 0
COUGH: 0

## 2024-03-29 ASSESSMENT — PAIN SCALES - GENERAL: PAINLEVEL: 0-NO PAIN

## 2024-03-29 NOTE — PROGRESS NOTES
[facility] - Medical Oncology Follow-Up Visit    Patient ID: Dora Dueñas is a 63 y.o. female with stage IIb lung adenocarcinoma s/p RUL segmentectomy, with TP53 mutation and PD-L1 60%.     Current therapy: adjuvant atezolizumab     Chief Concern: Readiness to treat visit.     HPI: Patient present in clinic for follow-up and readiness to treat visit.  Feeling good today.  Breathing feels good.   Congestion has resolved.  Energy level is good.  Started classes with silver sneakers.  Appetite - good and bad.  At times does not feel like eating.  Eating smaller portions.  Wt is stable.  Denies n/v/d.  Recent constipation resolved, food related.  Denies fevers, chills,  or CP.  No rashes or skin concerns. Ready for treatment today.       Diagnosis: Lung adenocarcinoma  Stage:  Cancer Staging   Primary malignant neoplasm of right upper lobe of lung (CMS/HCC)  Staging form: Lung, AJCC 8th Edition  - Pathologic: Stage IIB (pT1c, pN1, cM0) - Signed by Keena Roy MD on 9/25/2023     Current sites of disease: JULITO s/p RUL segmentectomy  Molecular and ancillary testing:   NGS with TP53 mutation  PD-L1 60%    Oncologic History:  1/11/23 - cardiac CT with RUL nodule  2/14/23 - CT chest with RUL nodule  3/17/23 - PET with FDG-avid RUL nodule no suspicious LNs  6/5/23 - RUL segmentectomy and LN dissection with pT1cN1 adenocarcinoma; 1 peribronchial LN positive for malignancy  8/7/23 -10/9/23 - adjuvant cisplatin/pemetrexed (completed 4 cycles)  11/10/23 - C1 adjuvant atezolizumab  2/6/24 - ED visit 2/2 SOB, increased cough (-) PE, work-up (-)    Oncology History   Primary malignant neoplasm of right upper lobe of lung (CMS/HCC)   6/12/2023 Initial Diagnosis    Primary malignant neoplasm of right upper lobe of lung (CMS/HCC)     8/7/2023 - 10/9/2023 Chemotherapy    PEMEtrexed / CISplatin, 21 Day Cycles - Thoracic      9/25/2023 Cancer Staged    Staging form: Lung, AJCC 8th Edition, Pathologic:  Stage IIB (pT1c, pN1, cM0) - Signed by Keena Roy MD on 9/25/2023     11/10/2023 -  Chemotherapy    Atezolizumab, 21 Day Cycles       Past Medical/Surgical Hx:   HTN  HLD  osteoarthritis  osteoporosis     Social Hx:   +tobacco use 1ppd since age 23, has quit at times in the past including recently prior to surgery, recently started again. Laser therapy helped for 3m  Lives at home with adult son whom she cares for (has autism) and her adult daughter who is a big support   Lots of friends and community who support her as well     Family Hx:   Paternal grandfather had lung cancer - was a smoker    Meds (Current):    Current Outpatient Medications:     ALPRAZolam (Xanax) 0.5 mg tablet, Take 1 tablet (0.5 mg) by mouth if needed., Disp: , Rfl:     atorvastatin (Lipitor) 40 mg tablet, Take 1 tablet (40 mg) by mouth once daily., Disp: , Rfl:     buPROPion XL (Wellbutrin XL) 150 mg 24 hr tablet, TAKE ONE TABLET BY MOUTH IN THE MORNING AS DIRECTED, Disp: , Rfl:     cholecalciferol (Vitamin D-3) 25 MCG (1000 UT) capsule, Take 1 capsule (25 mcg) by mouth once daily., Disp: , Rfl:     escitalopram (Lexapro) 20 mg tablet, TAKE TWO TABLETS BY MOUTH DAILY IN THE MORNING AS DIRECTED., Disp: , Rfl:     losartan (Cozaar) 100 mg tablet, TAKE ONE TABLET BY MOUTH EVERY DAY, Disp: 90 tablet, Rfl: 0  No current facility-administered medications for this visit.    Facility-Administered Medications Ordered in Other Visits:     albuterol 2.5 mg /3 mL (0.083 %) nebulizer solution 3 mL, 3 mL, nebulization, PRN, Keena Roy MD    dextrose 5 % in water (D5W) bolus, 500 mL, intravenous, PRN, Keena Roy MD    diphenhydrAMINE (BENADryl) injection 50 mg, 50 mg, intravenous, PRN, Keena Roy MD    EPINEPHrine (Epipen) injection syringe 0.3 mg, 0.3 mg, intramuscular, q5 min PRN, Keena Roy MD    famotidine PF (Pepcid) injection 20 mg, 20 mg, intravenous, Once PRN, Keena Roy MD    methylPREDNISolone sod succinate  (SOLU-Medrol) 40 mg/mL injection 40 mg, 40 mg, intravenous, PRN, Keena Roy MD    prochlorperazine (Compazine) injection 10 mg, 10 mg, intravenous, q6h PRN, Keena Roy MD    prochlorperazine (Compazine) tablet 10 mg, 10 mg, oral, q6h PRN, Keena Roy MD    sodium chloride 0.9 % bolus 500 mL, 500 mL, intravenous, PRN, Keena Roy MD    Review of Systems   Constitutional: Negative.  Negative for appetite change, fatigue and fever.   HENT:  Negative.     Respiratory: Negative.  Negative for cough and shortness of breath.         Upper chest congestion (mild)  +Sinus pressure - maxillary sinus area to above eyes   Cardiovascular: Negative.    Gastrointestinal:  Positive for constipation. Negative for diarrhea.        Food related, isolated incident    Genitourinary: Negative.  Negative for frequency.    Musculoskeletal: Negative.    Skin: Negative.    Neurological: Negative.    Psychiatric/Behavioral: Negative.        Objective   BSA: 1.64 meters squared  /84 (BP Location: Left arm, Patient Position: Sitting)   Pulse 71   Temp 36.8 °C (98.2 °F) (Temporal)   Resp 16   Wt 61.2 kg (134 lb 14.7 oz)   SpO2 97%   BMI 24.68 kg/m²     Wt Readings from Last 5 Encounters:   03/29/24 61.2 kg (134 lb 14.7 oz)   03/08/24 61.2 kg (134 lb 14.7 oz)   02/16/24 61.5 kg (135 lb 9.3 oz)   02/06/24 59.9 kg (132 lb)   01/19/24 60.1 kg (132 lb 7.9 oz)      Performance Status:  Asymptomatic - ECOG 0     Physical Exam  Vitals reviewed.   Constitutional:       Appearance: Normal appearance.   HENT:      Head: Normocephalic and atraumatic.      Nose: Nose normal.      Mouth/Throat:      Mouth: Mucous membranes are moist.      Pharynx: Oropharynx is clear.   Eyes:      Extraocular Movements: Extraocular movements intact.      Conjunctiva/sclera: Conjunctivae normal.      Pupils: Pupils are equal, round, and reactive to light.   Cardiovascular:      Rate and Rhythm: Normal rate and regular rhythm.      Pulses: Normal  pulses.      Heart sounds: Normal heart sounds.   Pulmonary:      Effort: Pulmonary effort is normal.      Breath sounds: Normal breath sounds.      Comments: Diminished Rul 2/2 wedge resection   Abdominal:      General: Bowel sounds are normal. There is no distension.      Palpations: Abdomen is soft.   Musculoskeletal:         General: Normal range of motion.      Cervical back: Normal range of motion and neck supple.   Skin:     General: Skin is warm and dry.      Capillary Refill: Capillary refill takes less than 2 seconds.   Neurological:      General: No focal deficit present.      Mental Status: She is alert and oriented to person, place, and time.   Psychiatric:         Mood and Affect: Mood normal.         Behavior: Behavior normal.         Thought Content: Thought content normal.         Judgment: Judgment normal.        Results:  Labs:  Lab Results   Component Value Date    WBC 7.3 03/28/2024    HGB 13.1 03/28/2024    HCT 38.9 03/28/2024    MCV 92 03/28/2024     03/28/2024      Lab Results   Component Value Date    NEUTROABS 4.08 03/28/2024      Lab Results   Component Value Date    GLUCOSE 102 (H) 03/28/2024    CALCIUM 9.6 03/28/2024     03/28/2024    K 3.8 03/28/2024    CO2 28 03/28/2024     03/28/2024    BUN 16 03/28/2024    CREATININE 0.79 03/28/2024    MG 1.76 02/06/2024     Lab Results   Component Value Date    ALT 23 03/28/2024    AST 19 03/28/2024    ALKPHOS 85 03/28/2024    BILITOT 0.7 03/28/2024      Imaging:   No new imaging.     Pathology:  No new pathology.    Assessment/Plan      Dora Dueñas is a 63 y.o. female here for follow up of stage IIb lung adenocarcinoma with PD-L1 60% and no actionable mutations on NGS, s/p adjuvant chemotherapy after RUL segmentectomy.    She tolerated adjuvant chemotherapy well with progressive fatigue after the 4th cycle. Here today to start adjuvant immunotherapy. Discussed logistics and side effects of atezolizumab including but not  limited to risk of immune-related AEs - most commonly thyroiditis, rash, colitis, fatigue but also rare risk of pneumonitis, myocarditis, nephritis, hepatitis, and other endocrine or neurologic AEs.     #Stage IIb lung adenocarcinoma -   - Proceed with adjuvant atezolizumab today, plan up to 1 year of treatment (17 doses)  - Consent signed 11/10  - Plan CT Chest with IV contrast q6m for now (last scan 11/2/23)  - Reports she is comfortable with her decision to receive immunotherapy.   - Will hold scheduled 3/8 treatment, rescheduled to 3/15.    #Mucositis - due to chemotherapy  - resolved     # Hypokalemia  - Improved, likely from chemo, ongoing monitor  - resolved     # Hyponatremia (chronic) - at baseline  - Pt asymptomatic  - resolved    # Cough (2/6/24 ED visit) - improved  - Likely viral   - Tessalon Pearls taken once, did not see benefit  - Prefers not to take additional medications  - Supportive care   - resolved    # Productive-cough (3/8 sick visit)  - Increased nocturnal cough  - intermittent with yellowish sputum  - Low grade temps - 99.  - Likely sinusitis, hx of   - STAT CXR- No acute findings  - Rx Z-Nick - completed   - Hycodan Rx x7 days  - resolved    # Urinary frequency/trace hematuria 1+ -> (repeat UA) 0.03.  - Frequency symptom resolving  - Denies dysuria, trauma, recent UTI(s)  - No gross hematuria  - No hx of microscopic hematuria  - Will follow-up with her PCP  - resolved    # Hypothyroidism/elevated TSH/normal FT4  - Will monitor for now    - Repeat labs Q6wks - next 4/18/24.      RTC in 3 weeks with Dr. Roy visit and C7.

## 2024-04-12 ENCOUNTER — OFFICE VISIT (OUTPATIENT)
Dept: PRIMARY CARE | Facility: CLINIC | Age: 64
End: 2024-04-12
Payer: MEDICARE

## 2024-04-12 VITALS
OXYGEN SATURATION: 93 % | WEIGHT: 136 LBS | HEIGHT: 63 IN | HEART RATE: 68 BPM | SYSTOLIC BLOOD PRESSURE: 115 MMHG | TEMPERATURE: 97.5 F | BODY MASS INDEX: 24.1 KG/M2 | DIASTOLIC BLOOD PRESSURE: 65 MMHG

## 2024-04-12 DIAGNOSIS — R31.21 ASYMPTOMATIC MICROSCOPIC HEMATURIA: ICD-10-CM

## 2024-04-12 DIAGNOSIS — F32.0 MILD MAJOR DEPRESSION (CMS-HCC): ICD-10-CM

## 2024-04-12 DIAGNOSIS — C77.1 SECONDARY AND UNSPECIFIED MALIGNANT NEOPLASM OF INTRATHORACIC LYMPH NODES (MULTI): ICD-10-CM

## 2024-04-12 DIAGNOSIS — E22.2 SYNDROME OF INAPPROPRIATE SECRETION OF ANTIDIURETIC HORMONE (MULTI): ICD-10-CM

## 2024-04-12 DIAGNOSIS — R39.9 UTI SYMPTOMS: Primary | ICD-10-CM

## 2024-04-12 LAB
POC APPEARANCE, URINE: CLEAR
POC BILIRUBIN, URINE: NEGATIVE
POC BLOOD, URINE: ABNORMAL
POC COLOR, URINE: YELLOW
POC GLUCOSE, URINE: NEGATIVE MG/DL
POC KETONES, URINE: NEGATIVE MG/DL
POC LEUKOCYTES, URINE: NEGATIVE
POC NITRITE,URINE: NEGATIVE
POC PH, URINE: 6 PH
POC PROTEIN, URINE: NEGATIVE MG/DL
POC SPECIFIC GRAVITY, URINE: <=1.005
POC UROBILINOGEN, URINE: 0.2 EU/DL

## 2024-04-12 PROCEDURE — 99214 OFFICE O/P EST MOD 30 MIN: CPT | Performed by: FAMILY MEDICINE

## 2024-04-12 PROCEDURE — 1160F RVW MEDS BY RX/DR IN RCRD: CPT | Performed by: FAMILY MEDICINE

## 2024-04-12 PROCEDURE — 3078F DIAST BP <80 MM HG: CPT | Performed by: FAMILY MEDICINE

## 2024-04-12 PROCEDURE — 81003 URINALYSIS AUTO W/O SCOPE: CPT | Performed by: FAMILY MEDICINE

## 2024-04-12 PROCEDURE — 1124F ACP DISCUSS-NO DSCNMKR DOCD: CPT | Performed by: FAMILY MEDICINE

## 2024-04-12 PROCEDURE — 3074F SYST BP LT 130 MM HG: CPT | Performed by: FAMILY MEDICINE

## 2024-04-12 PROCEDURE — 87086 URINE CULTURE/COLONY COUNT: CPT

## 2024-04-12 PROCEDURE — 1159F MED LIST DOCD IN RCRD: CPT | Performed by: FAMILY MEDICINE

## 2024-04-12 PROCEDURE — 1157F ADVNC CARE PLAN IN RCRD: CPT | Performed by: FAMILY MEDICINE

## 2024-04-12 ASSESSMENT — PATIENT HEALTH QUESTIONNAIRE - PHQ9
SUM OF ALL RESPONSES TO PHQ9 QUESTIONS 1 AND 2: 0
2. FEELING DOWN, DEPRESSED OR HOPELESS: NOT AT ALL
1. LITTLE INTEREST OR PLEASURE IN DOING THINGS: NOT AT ALL

## 2024-04-12 NOTE — PROGRESS NOTES
Subjective   Patient ID: Dora Dueñas is a 63 y.o. female who presents for Follow-up (Refills, med check, pt thinks she is having adverse reactions to immuno therapy, cough, UTI Sx since last ER visit. ).  Very pleasant 63-year-old here today because of trace hematuria she has been on immunotherapy and is struggling with side effects for her lung cancer for the immunotherapy for her lung cancer she really wants to discontinue  She has not had any pain or burning no fever or chills but she has had a little bit of urinary frequency and has not noticed any blood she mentioned this to her oncologist a urinalysis was done there was trace blood in her urine  She has had no flank pain no weight loss she otherwise was feeling well except for side effects from the immunotherapy some diarrhea and fatigue etc.          Review of Systems  Constitutional: no chills, no fever and no night sweats.   Eyes: no blurred vision and no eyesight problems.   ENT: no hearing loss, no nasal congestion, no nasal discharge, no hoarseness and no sore throat.   Cardiovascular: no chest pain, no intermittent leg claudication, no lower extremity edema, no palpitations and no syncope.   Respiratory: no cough, no shortness of breath during exertion, no shortness of breath at rest and no wheezing.   Gastrointestinal: no abdominal pain, no blood in stools, no constipation, no diarrhea, no melena, no nausea, no rectal pain and no vomiting.   Genitourinary: no dysuria, no change in urinary frequency, no urinary hesitancy, no feelings of urinary urgency and no vaginal discharge.   Musculoskeletal: no arthralgias,  no back pain and no myalgias.   Integumentary: no new skin lesions and no rashes.   Neurological: no difficulty walking, no headache, no limb weakness, no numbness and no tingling.   Psychiatric: no anxiety, no depression, no anhedonia and no substance use disorders.   Endocrine: no recent weight gain and no recent weight loss.  "  Hematologic/Lymphatic: no tendency for easy bruising and no swollen glands .  Objective    /65   Pulse 68   Temp 36.4 °C (97.5 °F)   Ht 1.6 m (5' 3\")   Wt 61.7 kg (136 lb)   SpO2 93%   BMI 24.09 kg/m²    Physical Exam  The patient appeared well nourished and normally developed. Vital signs as documented. Head exam is unremarkable. No scleral icterus or corneal arcus noted.  Pupils are equal round reactive to light extraocular movements are intact no hemorrhages noted on funduscopic exam mouth mucous membranes are moist no exudates ears canals clear TMs are gray pearly not injected nose no rhinorrhea or epistaxis Neck is without jugular venous distension, thyromegaly, or carotid bruits. Carotid upstrokes are brisk bilaterally. Lungs are clear to auscultation and percussion. Cardiac exam reveals the PMI to be normally sized and situated. Rhythm is regular. First and second heart sounds normal. No murmurs, rubs or gallops. Abdominal exam reveals normal bowel sounds, no masses, no organomegaly and no aortic enlargement. Extremities are nonedematous and both femoral and pedal pulses are normal.  Neurologic exam DTRs are equal bilaterally no focal deficits strength is symmetrical heme lymph no palpable lymph nodes in the neck axilla or groin    Assessment/Plan   Problem List Items Addressed This Visit       Mild major depression (CMS-HCC)     Stable and controlled based on symptoms and exam  In remission  Continue current medication and management         Hematuria     Trace hematuria  Patient is postmenopausal this is most likely nothing to worry about but will order ultrasound of kidneys consider urology for cystoscopy           Relevant Orders    US renal complete (Completed)    UTI symptoms - Primary    Relevant Orders    POCT UA Automated manually resulted (Completed)    Urine Culture (Completed)    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

## 2024-04-14 LAB — BACTERIA UR CULT: NORMAL

## 2024-04-17 ENCOUNTER — LAB (OUTPATIENT)
Dept: LAB | Facility: LAB | Age: 64
End: 2024-04-17
Payer: MEDICARE

## 2024-04-17 DIAGNOSIS — E78.2 MIXED HYPERLIPIDEMIA: ICD-10-CM

## 2024-04-17 DIAGNOSIS — I10 HYPERTENSION: ICD-10-CM

## 2024-04-17 DIAGNOSIS — C34.11 PRIMARY MALIGNANT NEOPLASM OF RIGHT UPPER LOBE OF LUNG (MULTI): ICD-10-CM

## 2024-04-17 LAB
ALBUMIN SERPL BCP-MCNC: 4.6 G/DL (ref 3.4–5)
ALP SERPL-CCNC: 63 U/L (ref 33–136)
ALT SERPL W P-5'-P-CCNC: 18 U/L (ref 7–45)
ANION GAP SERPL CALC-SCNC: 16 MMOL/L (ref 10–20)
AST SERPL W P-5'-P-CCNC: 18 U/L (ref 9–39)
BASOPHILS # BLD AUTO: 0.14 X10*3/UL (ref 0–0.1)
BASOPHILS NFR BLD AUTO: 1.4 %
BILIRUB SERPL-MCNC: 0.6 MG/DL (ref 0–1.2)
BUN SERPL-MCNC: 19 MG/DL (ref 6–23)
CALCIUM SERPL-MCNC: 9.3 MG/DL (ref 8.6–10.6)
CHLORIDE SERPL-SCNC: 99 MMOL/L (ref 98–107)
CO2 SERPL-SCNC: 25 MMOL/L (ref 21–32)
CORTIS AM PEAK SERPL-MSCNC: 13.7 UG/DL (ref 5–20)
CREAT SERPL-MCNC: 0.85 MG/DL (ref 0.5–1.05)
EGFRCR SERPLBLD CKD-EPI 2021: 76 ML/MIN/1.73M*2
EOSINOPHIL # BLD AUTO: 0.14 X10*3/UL (ref 0–0.7)
EOSINOPHIL NFR BLD AUTO: 1.4 %
ERYTHROCYTE [DISTWIDTH] IN BLOOD BY AUTOMATED COUNT: 13.4 % (ref 11.5–14.5)
GLUCOSE SERPL-MCNC: 69 MG/DL (ref 74–99)
HCT VFR BLD AUTO: 39.3 % (ref 36–46)
HGB BLD-MCNC: 13.2 G/DL (ref 12–16)
IMM GRANULOCYTES # BLD AUTO: 0.05 X10*3/UL (ref 0–0.7)
IMM GRANULOCYTES NFR BLD AUTO: 0.5 % (ref 0–0.9)
LYMPHOCYTES # BLD AUTO: 3.08 X10*3/UL (ref 1.2–4.8)
LYMPHOCYTES NFR BLD AUTO: 30.8 %
MCH RBC QN AUTO: 30.4 PG (ref 26–34)
MCHC RBC AUTO-ENTMCNC: 33.6 G/DL (ref 32–36)
MCV RBC AUTO: 91 FL (ref 80–100)
MONOCYTES # BLD AUTO: 0.71 X10*3/UL (ref 0.1–1)
MONOCYTES NFR BLD AUTO: 7.1 %
NEUTROPHILS # BLD AUTO: 5.87 X10*3/UL (ref 1.2–7.7)
NEUTROPHILS NFR BLD AUTO: 58.8 %
NRBC BLD-RTO: 0 /100 WBCS (ref 0–0)
PLATELET # BLD AUTO: 313 X10*3/UL (ref 150–450)
POTASSIUM SERPL-SCNC: 4.1 MMOL/L (ref 3.5–5.3)
PROT SERPL-MCNC: 7.6 G/DL (ref 6.4–8.2)
RBC # BLD AUTO: 4.34 X10*6/UL (ref 4–5.2)
SODIUM SERPL-SCNC: 136 MMOL/L (ref 136–145)
TSH SERPL-ACNC: 2.49 MIU/L (ref 0.44–3.98)
WBC # BLD AUTO: 10 X10*3/UL (ref 4.4–11.3)

## 2024-04-17 PROCEDURE — 85025 COMPLETE CBC W/AUTO DIFF WBC: CPT

## 2024-04-17 PROCEDURE — 84443 ASSAY THYROID STIM HORMONE: CPT

## 2024-04-17 PROCEDURE — 82533 TOTAL CORTISOL: CPT

## 2024-04-17 PROCEDURE — 36415 COLL VENOUS BLD VENIPUNCTURE: CPT

## 2024-04-17 PROCEDURE — 80053 COMPREHEN METABOLIC PANEL: CPT

## 2024-04-18 ENCOUNTER — APPOINTMENT (OUTPATIENT)
Dept: HEMATOLOGY/ONCOLOGY | Facility: CLINIC | Age: 64
End: 2024-04-18
Payer: MEDICARE

## 2024-04-18 ENCOUNTER — TELEPHONE (OUTPATIENT)
Dept: HEMATOLOGY/ONCOLOGY | Facility: CLINIC | Age: 64
End: 2024-04-18
Payer: MEDICARE

## 2024-04-18 RX ORDER — ATORVASTATIN CALCIUM 40 MG/1
40 TABLET, FILM COATED ORAL DAILY
Qty: 90 TABLET | Refills: 1 | Status: SHIPPED | OUTPATIENT
Start: 2024-04-18

## 2024-04-18 RX ORDER — LOSARTAN POTASSIUM 100 MG/1
100 TABLET ORAL DAILY
Qty: 90 TABLET | Refills: 1 | Status: SHIPPED | OUTPATIENT
Start: 2024-04-18

## 2024-04-23 ENCOUNTER — TELEPHONE (OUTPATIENT)
Dept: ADMISSION | Facility: HOSPITAL | Age: 64
End: 2024-04-23

## 2024-04-23 NOTE — TELEPHONE ENCOUNTER
Pt states that she wants to ensure Dr. Roy is aware that on Thur 4/25 FUV she would like to discuss ongoing side effects that she is having and has mentioned during previous visits-she feels these are related to her current treatment plan.    Pt declined to go into detail and is agreeable to discuss on Thur.

## 2024-04-25 ENCOUNTER — APPOINTMENT (OUTPATIENT)
Dept: HEMATOLOGY/ONCOLOGY | Facility: CLINIC | Age: 64
End: 2024-04-25
Payer: MEDICARE

## 2024-04-25 ENCOUNTER — OFFICE VISIT (OUTPATIENT)
Dept: HEMATOLOGY/ONCOLOGY | Facility: CLINIC | Age: 64
End: 2024-04-25
Payer: MEDICARE

## 2024-04-25 VITALS
SYSTOLIC BLOOD PRESSURE: 138 MMHG | HEART RATE: 61 BPM | OXYGEN SATURATION: 96 % | RESPIRATION RATE: 16 BRPM | DIASTOLIC BLOOD PRESSURE: 67 MMHG | WEIGHT: 135.8 LBS | TEMPERATURE: 98.8 F | BODY MASS INDEX: 24.06 KG/M2

## 2024-04-25 DIAGNOSIS — C34.11 PRIMARY MALIGNANT NEOPLASM OF RIGHT UPPER LOBE OF LUNG (MULTI): ICD-10-CM

## 2024-04-25 DIAGNOSIS — E03.9 HYPOTHYROIDISM (ACQUIRED): ICD-10-CM

## 2024-04-25 PROCEDURE — 1159F MED LIST DOCD IN RCRD: CPT | Performed by: INTERNAL MEDICINE

## 2024-04-25 PROCEDURE — 3075F SYST BP GE 130 - 139MM HG: CPT | Performed by: INTERNAL MEDICINE

## 2024-04-25 PROCEDURE — 1126F AMNT PAIN NOTED NONE PRSNT: CPT | Performed by: INTERNAL MEDICINE

## 2024-04-25 PROCEDURE — 3078F DIAST BP <80 MM HG: CPT | Performed by: INTERNAL MEDICINE

## 2024-04-25 PROCEDURE — 1157F ADVNC CARE PLAN IN RCRD: CPT | Performed by: INTERNAL MEDICINE

## 2024-04-25 PROCEDURE — 99214 OFFICE O/P EST MOD 30 MIN: CPT | Performed by: INTERNAL MEDICINE

## 2024-04-25 RX ORDER — PREDNISONE 20 MG/1
40 TABLET ORAL DAILY
Qty: 10 TABLET | Refills: 0 | Status: SHIPPED | OUTPATIENT
Start: 2024-04-25 | End: 2024-04-30

## 2024-04-25 ASSESSMENT — PAIN SCALES - GENERAL: PAINLEVEL: 0-NO PAIN

## 2024-04-26 ENCOUNTER — HOSPITAL ENCOUNTER (OUTPATIENT)
Dept: RADIOLOGY | Facility: HOSPITAL | Age: 64
Discharge: HOME | End: 2024-04-26
Payer: MEDICARE

## 2024-04-26 DIAGNOSIS — R31.21 ASYMPTOMATIC MICROSCOPIC HEMATURIA: ICD-10-CM

## 2024-04-26 PROCEDURE — 76770 US EXAM ABDO BACK WALL COMP: CPT

## 2024-04-26 PROCEDURE — 76770 US EXAM ABDO BACK WALL COMP: CPT | Performed by: STUDENT IN AN ORGANIZED HEALTH CARE EDUCATION/TRAINING PROGRAM

## 2024-04-26 ASSESSMENT — ENCOUNTER SYMPTOMS
COUGH: 0
CONSTIPATION: 0
CONSTITUTIONAL NEGATIVE: 1
DIARRHEA: 1
FEVER: 0
PSYCHIATRIC NEGATIVE: 1
FREQUENCY: 0
SHORTNESS OF BREATH: 0
RESPIRATORY NEGATIVE: 1
CARDIOVASCULAR NEGATIVE: 1
FATIGUE: 0
MUSCULOSKELETAL NEGATIVE: 1
NEUROLOGICAL NEGATIVE: 1
APPETITE CHANGE: 0

## 2024-04-26 NOTE — PROGRESS NOTES
[facility] - Medical Oncology Follow-Up Visit    Patient ID: Dora Dueñas is a 63 y.o. female with stage IIb lung adenocarcinoma s/p RUL segmentectomy, with TP53 mutation and PD-L1 60%.     Current therapy: adjuvant atezolizumab     Chief Concern: Readiness to treat visit.     HPI: Patient present in clinic for follow-up and readiness to treat visit.  Here with her daughter. Overall she reports feeling severe fatigue. Always tired despite sleeping ok, though is anxious especially the weeks she is scheduled for immunotherapy and that impacts sleep. Still going to exercise classes but has to come home and go back to bed. Has to drag herself to do the things she needs to. Also notes ongoing bowel issues - alternating diarrhea/constipation but usually diarrhea. Some days 4-5 episodes per day but this has been really variable. Feels worse now than during chemo. Has sinus congestion but this is a yearly issue for her.    Diagnosis: Lung adenocarcinoma  Stage:  Cancer Staging   Primary malignant neoplasm of right upper lobe of lung (Multi)  Staging form: Lung, AJCC 8th Edition  - Pathologic: Stage IIB (pT1c, pN1, cM0) - Signed by Keena Roy MD on 9/25/2023     Current sites of disease: JULITO s/p RUL segmentectomy  Molecular and ancillary testing:   NGS with TP53 mutation  PD-L1 60%    Oncologic History:  1/11/23 - cardiac CT with RUL nodule  2/14/23 - CT chest with RUL nodule  3/17/23 - PET with FDG-avid RUL nodule no suspicious LNs  6/5/23 - RUL segmentectomy and LN dissection with pT1cN1 adenocarcinoma; 1 peribronchial LN positive for malignancy  8/7/23 -10/9/23 - adjuvant cisplatin/pemetrexed (completed 4 cycles)  11/10/23 - C1 adjuvant atezolizumab  2/6/24 - ED visit 2/2 SOB, increased cough (-) PE, work-up (-)    Oncology History   Primary malignant neoplasm of right upper lobe of lung (Multi)   6/12/2023 Initial Diagnosis    Primary malignant neoplasm of right upper lobe of lung  (CMS/[facility])     8/7/2023 - 10/9/2023 Chemotherapy    PEMEtrexed / CISplatin, 21 Day Cycles - Thoracic      9/25/2023 Cancer Staged    Staging form: Lung, AJCC 8th Edition, Pathologic: Stage IIB (pT1c, pN1, cM0) - Signed by Keena Roy MD on 9/25/2023     11/10/2023 -  Chemotherapy    Atezolizumab, 21 Day Cycles       Past Medical/Surgical Hx:   HTN  HLD  osteoarthritis  osteoporosis     Social Hx:   +tobacco use 1ppd since age 23, has quit at times in the past including recently prior to surgery, recently started again. Laser therapy helped for 3m  Lives at home with adult son whom she cares for (has autism) and her adult daughter who is a big support   Lots of friends and community who support her as well     Family Hx:   Paternal grandfather had lung cancer - was a smoker    Meds (Current):    Current Outpatient Medications:     ALPRAZolam (Xanax) 0.5 mg tablet, Take 1 tablet (0.5 mg) by mouth if needed., Disp: , Rfl:     atorvastatin (Lipitor) 40 mg tablet, Take 1 tablet (40 mg) by mouth once daily., Disp: 90 tablet, Rfl: 1    buPROPion XL (Wellbutrin XL) 150 mg 24 hr tablet, TAKE ONE TABLET BY MOUTH IN THE MORNING AS DIRECTED, Disp: , Rfl:     cholecalciferol (Vitamin D-3) 25 MCG (1000 UT) capsule, Take 1 capsule (25 mcg) by mouth once daily., Disp: , Rfl:     escitalopram (Lexapro) 20 mg tablet, TAKE TWO TABLETS BY MOUTH DAILY IN THE MORNING AS DIRECTED., Disp: , Rfl:     losartan (Cozaar) 100 mg tablet, Take 1 tablet (100 mg) by mouth once daily., Disp: 90 tablet, Rfl: 1    predniSONE (Deltasone) 20 mg tablet, Take 2 tablets (40 mg) by mouth once daily for 5 days., Disp: 10 tablet, Rfl: 0    Review of Systems   Constitutional: Negative.  Negative for appetite change, fatigue and fever.   HENT:  Negative.     Respiratory: Negative.  Negative for cough and shortness of breath.         Upper chest congestion (mild)  +Sinus pressure - maxillary sinus area to above eyes   Cardiovascular: Negative.     Gastrointestinal:  Positive for diarrhea. Negative for constipation.   Genitourinary: Negative.  Negative for frequency.    Musculoskeletal: Negative.    Skin: Negative.    Neurological: Negative.    Psychiatric/Behavioral: Negative.        Objective   BSA: 1.65 meters squared  /67 (BP Location: Left arm, Patient Position: Sitting)   Pulse 61   Temp 37.1 °C (98.8 °F) (Temporal)   Resp 16   Wt 61.6 kg (135 lb 12.9 oz)   SpO2 96%   BMI 24.06 kg/m²     Wt Readings from Last 5 Encounters:   04/25/24 61.6 kg (135 lb 12.9 oz)   04/12/24 61.7 kg (136 lb)   03/29/24 61.2 kg (134 lb 14.7 oz)   03/08/24 61.2 kg (134 lb 14.7 oz)   02/16/24 61.5 kg (135 lb 9.3 oz)      Performance Status:  Asymptomatic - ECOG 1     Physical Exam  Vitals reviewed.   Constitutional:       Appearance: Normal appearance.   HENT:      Head: Normocephalic and atraumatic.      Nose: Nose normal.      Mouth/Throat:      Mouth: Mucous membranes are moist.      Pharynx: Oropharynx is clear.   Eyes:      Extraocular Movements: Extraocular movements intact.      Conjunctiva/sclera: Conjunctivae normal.      Pupils: Pupils are equal, round, and reactive to light.   Cardiovascular:      Rate and Rhythm: Normal rate and regular rhythm.      Pulses: Normal pulses.      Heart sounds: Normal heart sounds.   Pulmonary:      Effort: Pulmonary effort is normal.      Breath sounds: Normal breath sounds.      Comments: Diminished Rul 2/2 wedge resection   Abdominal:      General: Bowel sounds are normal. There is no distension.      Palpations: Abdomen is soft.   Musculoskeletal:         General: Normal range of motion.      Cervical back: Normal range of motion and neck supple.   Skin:     General: Skin is warm and dry.      Capillary Refill: Capillary refill takes less than 2 seconds.   Neurological:      General: No focal deficit present.      Mental Status: She is alert and oriented to person, place, and time.   Psychiatric:         Mood and Affect:  Mood normal.         Behavior: Behavior normal.         Thought Content: Thought content normal.         Judgment: Judgment normal.        Results:  Labs:  Lab Results   Component Value Date    WBC 10.0 04/17/2024    HGB 13.2 04/17/2024    HCT 39.3 04/17/2024    MCV 91 04/17/2024     04/17/2024      Lab Results   Component Value Date    NEUTROABS 5.87 04/17/2024      Lab Results   Component Value Date    GLUCOSE 69 (L) 04/17/2024    CALCIUM 9.3 04/17/2024     04/17/2024    K 4.1 04/17/2024    CO2 25 04/17/2024    CL 99 04/17/2024    BUN 19 04/17/2024    CREATININE 0.85 04/17/2024    MG 1.76 02/06/2024     Lab Results   Component Value Date    ALT 18 04/17/2024    AST 18 04/17/2024    ALKPHOS 63 04/17/2024    BILITOT 0.6 04/17/2024      Imaging:   No new imaging.     Pathology:  No new pathology.    Assessment/Plan      Dora Dueñas is a 63 y.o. female here for follow up of stage IIb lung adenocarcinoma with PD-L1 60% and no actionable mutations on NGS, s/p adjuvant chemotherapy after RUL segmentectomy.    She tolerated adjuvant chemotherapy well with progressive fatigue after the 4th cycle. She started adjuvant immunotherapy.    Today endorsing ongoing severe fatigue, intermittent diarrhea. Discussed that although not meeting clear criteria for IRAE it may be related to her immunotherapy. She is frustrated with how she feels and needs a break.      #Stage IIb lung adenocarcinoma -   - Hold adjuvant atezolizumab today (completed 6/17 cycles so far), will decide at follow-up if she would like to retry  - Plan CT Chest with IV contrast q6m for now (last scan CTPE 2/2024)  - Discussed can consider earlier imaging if clinical symptoms but for now would keep at 6m    #Fatigue - severe  #Diarrhea - variable in severity/frequency but overall baseline different compared to before starting immunotherapy  - TSH normal, recheck next visit  - ACTH/cortisol normal  - Trial prednisone 40mg daily x5 days and assess  response  - Hold immunotherapy    # Cough (2/6/24 ED visit) - improved  - Likely viral   - Tessalon Pearls taken once, did not see benefit  - Prefers not to take additional medications  - Supportive care   - resolved    # Productive-cough (3/8 sick visit)  - Increased nocturnal cough  - intermittent with yellowish sputum  - Low grade temps - 99.  - Likely sinusitis, hx of   - STAT CXR- No acute findings  - Rx Z-Nick - completed   - Hycodan Rx x7 days  - resolved    # Urinary frequency/trace hematuria 1+ -> (repeat UA) 0.03.  - Frequency symptom resolving  - Denies dysuria, trauma, recent UTI(s)  - No gross hematuria  - No hx of microscopic hematuria  - Will follow-up with her PCP, may be related to post-menopausal state  - Has renal ultrasound scheduled  - Discussed can consider urology referral if persists      RTC in 3 weeks with repeat labs    Keena Roy MD  [facility]

## 2024-04-30 PROBLEM — R39.9 UTI SYMPTOMS: Status: ACTIVE | Noted: 2024-04-30

## 2024-04-30 PROBLEM — E22.2 SYNDROME OF INAPPROPRIATE SECRETION OF ANTIDIURETIC HORMONE (MULTI): Status: ACTIVE | Noted: 2024-04-30

## 2024-04-30 PROBLEM — E78.2 MIXED HYPERLIPIDEMIA: Status: ACTIVE | Noted: 2023-03-16

## 2024-04-30 PROBLEM — C77.1 SECONDARY AND UNSPECIFIED MALIGNANT NEOPLASM OF INTRATHORACIC LYMPH NODES (MULTI): Status: ACTIVE | Noted: 2024-04-30

## 2024-04-30 NOTE — ASSESSMENT & PLAN NOTE
Trace hematuria  Patient is postmenopausal this is most likely nothing to worry about but will order ultrasound of kidneys consider urology for cystoscopy

## 2024-04-30 NOTE — ASSESSMENT & PLAN NOTE
Stable controlled and asymptomatic  Continue to monitor sodium level  No current issues at this time

## 2024-04-30 NOTE — ASSESSMENT & PLAN NOTE
Stable and controlled based on symptoms and exam  In remission  Continue current medication and management

## 2024-05-16 ENCOUNTER — LAB (OUTPATIENT)
Dept: LAB | Facility: CLINIC | Age: 64
End: 2024-05-16
Payer: MEDICARE

## 2024-05-16 ENCOUNTER — OFFICE VISIT (OUTPATIENT)
Dept: HEMATOLOGY/ONCOLOGY | Facility: CLINIC | Age: 64
End: 2024-05-16
Payer: MEDICARE

## 2024-05-16 VITALS
RESPIRATION RATE: 18 BRPM | TEMPERATURE: 97.7 F | OXYGEN SATURATION: 94 % | BODY MASS INDEX: 24.1 KG/M2 | HEART RATE: 64 BPM | WEIGHT: 136.02 LBS | DIASTOLIC BLOOD PRESSURE: 79 MMHG | SYSTOLIC BLOOD PRESSURE: 147 MMHG

## 2024-05-16 DIAGNOSIS — E03.9 HYPOTHYROIDISM (ACQUIRED): ICD-10-CM

## 2024-05-16 DIAGNOSIS — C34.11 PRIMARY MALIGNANT NEOPLASM OF RIGHT UPPER LOBE OF LUNG (MULTI): ICD-10-CM

## 2024-05-16 LAB
ALBUMIN SERPL BCP-MCNC: 4.4 G/DL (ref 3.4–5)
ALP SERPL-CCNC: 59 U/L (ref 33–136)
ALT SERPL W P-5'-P-CCNC: 16 U/L (ref 7–45)
ANION GAP SERPL CALC-SCNC: 15 MMOL/L (ref 10–20)
AST SERPL W P-5'-P-CCNC: 14 U/L (ref 9–39)
BASOPHILS # BLD AUTO: 0.04 X10*3/UL (ref 0–0.1)
BASOPHILS NFR BLD AUTO: 0.6 %
BILIRUB SERPL-MCNC: 1 MG/DL (ref 0–1.2)
BUN SERPL-MCNC: 12 MG/DL (ref 6–23)
CALCIUM SERPL-MCNC: 9.1 MG/DL (ref 8.6–10.3)
CHLORIDE SERPL-SCNC: 99 MMOL/L (ref 98–107)
CO2 SERPL-SCNC: 26 MMOL/L (ref 21–32)
CREAT SERPL-MCNC: 0.83 MG/DL (ref 0.5–1.05)
EGFRCR SERPLBLD CKD-EPI 2021: 78 ML/MIN/1.73M*2
EOSINOPHIL # BLD AUTO: 0.07 X10*3/UL (ref 0–0.7)
EOSINOPHIL NFR BLD AUTO: 1.1 %
ERYTHROCYTE [DISTWIDTH] IN BLOOD BY AUTOMATED COUNT: 13.7 % (ref 11.5–14.5)
GLUCOSE SERPL-MCNC: 109 MG/DL (ref 74–99)
HCT VFR BLD AUTO: 39.1 % (ref 36–46)
HGB BLD-MCNC: 12.8 G/DL (ref 12–16)
IMM GRANULOCYTES # BLD AUTO: 0.02 X10*3/UL (ref 0–0.7)
IMM GRANULOCYTES NFR BLD AUTO: 0.3 % (ref 0–0.9)
LYMPHOCYTES # BLD AUTO: 2.1 X10*3/UL (ref 1.2–4.8)
LYMPHOCYTES NFR BLD AUTO: 31.7 %
MCH RBC QN AUTO: 30.2 PG (ref 26–34)
MCHC RBC AUTO-ENTMCNC: 32.7 G/DL (ref 32–36)
MCV RBC AUTO: 92 FL (ref 80–100)
MONOCYTES # BLD AUTO: 0.51 X10*3/UL (ref 0.1–1)
MONOCYTES NFR BLD AUTO: 7.7 %
NEUTROPHILS # BLD AUTO: 3.89 X10*3/UL (ref 1.2–7.7)
NEUTROPHILS NFR BLD AUTO: 58.6 %
NRBC BLD-RTO: NORMAL /100{WBCS}
PLATELET # BLD AUTO: 221 X10*3/UL (ref 150–450)
POTASSIUM SERPL-SCNC: 3.3 MMOL/L (ref 3.5–5.3)
PROT SERPL-MCNC: 7.3 G/DL (ref 6.4–8.2)
RBC # BLD AUTO: 4.24 X10*6/UL (ref 4–5.2)
SODIUM SERPL-SCNC: 137 MMOL/L (ref 136–145)
T4 FREE SERPL-MCNC: 0.98 NG/DL (ref 0.78–1.48)
TSH SERPL-ACNC: 3.97 MIU/L (ref 0.44–3.98)
WBC # BLD AUTO: 6.6 X10*3/UL (ref 4.4–11.3)

## 2024-05-16 PROCEDURE — 1157F ADVNC CARE PLAN IN RCRD: CPT | Performed by: INTERNAL MEDICINE

## 2024-05-16 PROCEDURE — 99214 OFFICE O/P EST MOD 30 MIN: CPT | Performed by: INTERNAL MEDICINE

## 2024-05-16 PROCEDURE — 84439 ASSAY OF FREE THYROXINE: CPT

## 2024-05-16 PROCEDURE — 36415 COLL VENOUS BLD VENIPUNCTURE: CPT

## 2024-05-16 PROCEDURE — 80053 COMPREHEN METABOLIC PANEL: CPT

## 2024-05-16 PROCEDURE — 3078F DIAST BP <80 MM HG: CPT | Performed by: INTERNAL MEDICINE

## 2024-05-16 PROCEDURE — 1160F RVW MEDS BY RX/DR IN RCRD: CPT | Performed by: INTERNAL MEDICINE

## 2024-05-16 PROCEDURE — 1159F MED LIST DOCD IN RCRD: CPT | Performed by: INTERNAL MEDICINE

## 2024-05-16 PROCEDURE — 3077F SYST BP >= 140 MM HG: CPT | Performed by: INTERNAL MEDICINE

## 2024-05-16 PROCEDURE — 84443 ASSAY THYROID STIM HORMONE: CPT

## 2024-05-16 PROCEDURE — 85025 COMPLETE CBC W/AUTO DIFF WBC: CPT

## 2024-05-16 PROCEDURE — 1126F AMNT PAIN NOTED NONE PRSNT: CPT | Performed by: INTERNAL MEDICINE

## 2024-05-16 ASSESSMENT — ENCOUNTER SYMPTOMS
FEVER: 0
DIARRHEA: 0
CARDIOVASCULAR NEGATIVE: 1
RESPIRATORY NEGATIVE: 1
APPETITE CHANGE: 0
MUSCULOSKELETAL NEGATIVE: 1
COUGH: 0
FATIGUE: 0
PSYCHIATRIC NEGATIVE: 1
NEUROLOGICAL NEGATIVE: 1
CONSTITUTIONAL NEGATIVE: 1
FREQUENCY: 0
SHORTNESS OF BREATH: 0
CONSTIPATION: 0

## 2024-05-16 ASSESSMENT — PAIN SCALES - GENERAL: PAINLEVEL: 0-NO PAIN

## 2024-05-16 NOTE — PROGRESS NOTES
[facility] - Medical Oncology Follow-Up Visit    Patient ID: Dora Dueñas is a 63 y.o. female with stage IIb lung adenocarcinoma s/p RUL segmentectomy, with TP53 mutation and PD-L1 60%.     Current therapy: adjuvant atezolizumab - on hold     Chief Concern: Here for follow-up and treatment planning    HPI: Patient present in clinic for follow-up. Overall feeling a lot better since last visit. More energy, more positive just feels better. Felt the steroids helped. Bowels improved but still hard to regulate between constipation/loose stools. Breathing feels good. No new concerns.    Diagnosis: Lung adenocarcinoma  Stage:  Cancer Staging   Primary malignant neoplasm of right upper lobe of lung (Multi)  Staging form: Lung, AJCC 8th Edition  - Pathologic: Stage IIB (pT1c, pN1, cM0) - Signed by Keena Roy MD on 9/25/2023     Current sites of disease: JULITO s/p RUL segmentectomy  Molecular and ancillary testing:   NGS with TP53 mutation  PD-L1 60%    Oncologic History:  1/11/23 - cardiac CT with RUL nodule  2/14/23 - CT chest with RUL nodule  3/17/23 - PET with FDG-avid RUL nodule no suspicious LNs  6/5/23 - RUL segmentectomy and LN dissection with pT1cN1 adenocarcinoma; 1 peribronchial LN positive for malignancy  8/7/23 -10/9/23 - adjuvant cisplatin/pemetrexed (completed 4 cycles)  11/10/23 - C1 adjuvant atezolizumab  2/6/24 - ED visit 2/2 SOB, increased cough (-) PE, work-up (-)    Oncology History   Primary malignant neoplasm of right upper lobe of lung (Multi)   6/12/2023 Initial Diagnosis    Primary malignant neoplasm of right upper lobe of lung (CMS/HCC)     8/7/2023 - 10/9/2023 Chemotherapy    PEMEtrexed / CISplatin, 21 Day Cycles - Thoracic      9/25/2023 Cancer Staged    Staging form: Lung, AJCC 8th Edition, Pathologic: Stage IIB (pT1c, pN1, cM0) - Signed by Keena Roy MD on 9/25/2023     11/10/2023 -  Chemotherapy    Atezolizumab, 21 Day Cycles       Past Medical/Surgical  Hx:   HTN  HLD  osteoarthritis  osteoporosis     Social Hx:   +tobacco use 1ppd since age 23, has quit at times in the past including recently prior to surgery, recently started again. Laser therapy helped for 3m  Lives at home with adult son whom she cares for (has autism) and her adult daughter who is a big support   Lots of friends and community who support her as well     Family Hx:   Paternal grandfather had lung cancer - was a smoker    Meds (Current):    Current Outpatient Medications:     ALPRAZolam (Xanax) 0.5 mg tablet, Take 1 tablet (0.5 mg) by mouth if needed., Disp: , Rfl:     atorvastatin (Lipitor) 40 mg tablet, Take 1 tablet (40 mg) by mouth once daily., Disp: 90 tablet, Rfl: 1    buPROPion XL (Wellbutrin XL) 150 mg 24 hr tablet, TAKE ONE TABLET BY MOUTH IN THE MORNING AS DIRECTED, Disp: , Rfl:     cholecalciferol (Vitamin D-3) 25 MCG (1000 UT) capsule, Take 1 capsule (25 mcg) by mouth once daily., Disp: , Rfl:     escitalopram (Lexapro) 20 mg tablet, TAKE TWO TABLETS BY MOUTH DAILY IN THE MORNING AS DIRECTED., Disp: , Rfl:     losartan (Cozaar) 100 mg tablet, Take 1 tablet (100 mg) by mouth once daily., Disp: 90 tablet, Rfl: 1    Review of Systems   Constitutional: Negative.  Negative for appetite change, fatigue and fever.   HENT:  Negative.     Respiratory: Negative.  Negative for cough and shortness of breath.    Cardiovascular: Negative.    Gastrointestinal:  Negative for constipation and diarrhea.   Genitourinary: Negative.  Negative for frequency.    Musculoskeletal: Negative.    Skin: Negative.    Neurological: Negative.    Psychiatric/Behavioral: Negative.        Objective   BSA: 1.66 meters squared  /79 (BP Location: Right arm, Patient Position: Sitting, BP Cuff Size: Adult)   Pulse 64   Temp 36.5 °C (97.7 °F) (Temporal)   Resp 18   Wt 61.7 kg (136 lb 0.4 oz)   SpO2 94%   BMI 24.10 kg/m²     Wt Readings from Last 5 Encounters:   05/16/24 61.7 kg (136 lb 0.4 oz)   04/25/24 61.6 kg  (135 lb 12.9 oz)   04/12/24 61.7 kg (136 lb)   03/29/24 61.2 kg (134 lb 14.7 oz)   03/08/24 61.2 kg (134 lb 14.7 oz)      Performance Status:  Asymptomatic - ECOG 0     Physical Exam  Vitals reviewed.   Constitutional:       Appearance: Normal appearance.   HENT:      Head: Normocephalic and atraumatic.      Nose: Nose normal.      Mouth/Throat:      Mouth: Mucous membranes are moist.      Pharynx: Oropharynx is clear.   Eyes:      Extraocular Movements: Extraocular movements intact.      Conjunctiva/sclera: Conjunctivae normal.      Pupils: Pupils are equal, round, and reactive to light.   Cardiovascular:      Rate and Rhythm: Normal rate and regular rhythm.      Pulses: Normal pulses.      Heart sounds: Normal heart sounds.   Pulmonary:      Effort: Pulmonary effort is normal.      Breath sounds: Normal breath sounds.   Abdominal:      General: Bowel sounds are normal. There is no distension.      Palpations: Abdomen is soft.   Musculoskeletal:         General: Normal range of motion.      Cervical back: Normal range of motion and neck supple.   Skin:     General: Skin is warm and dry.      Capillary Refill: Capillary refill takes less than 2 seconds.   Neurological:      General: No focal deficit present.      Mental Status: She is alert and oriented to person, place, and time.   Psychiatric:         Mood and Affect: Mood normal.         Behavior: Behavior normal.         Thought Content: Thought content normal.         Judgment: Judgment normal.        Results:  Labs:  Lab Results   Component Value Date    WBC 6.6 05/16/2024    HGB 12.8 05/16/2024    HCT 39.1 05/16/2024    MCV 92 05/16/2024     05/16/2024      Lab Results   Component Value Date    NEUTROABS 3.89 05/16/2024      Lab Results   Component Value Date    GLUCOSE 109 (H) 05/16/2024    CALCIUM 9.1 05/16/2024     05/16/2024    K 3.3 (L) 05/16/2024    CO2 26 05/16/2024    CL 99 05/16/2024    BUN 12 05/16/2024    CREATININE 0.83 05/16/2024     MG 1.76 02/06/2024     Lab Results   Component Value Date    ALT 16 05/16/2024    AST 14 05/16/2024    ALKPHOS 59 05/16/2024    BILITOT 1.0 05/16/2024      Imaging:   No new imaging.     Pathology:  No new pathology.    Assessment/Plan      Dora Dueñas is a 63 y.o. female here for follow up of stage IIb lung adenocarcinoma with PD-L1 60% and no actionable mutations on NGS, s/p adjuvant chemotherapy after RUL segmentectomy.    She tolerated adjuvant chemotherapy well with progressive fatigue after the 4th cycle. She started adjuvant immunotherapy.    Last visit endorsing ongoing severe fatigue, intermittent diarrhea. Discussed that although not meeting clear criteria for IRAE it may be related to her immunotherapy. She was frustrated with how she feels and needed a break.     S/p 5 days of prednisone. Overall she feels much improved, glad to be able to do more and have more energy. Discussed options moving forward including retrial of immunotherapy, though with risk that fatigue will recur, vs proceeding with surveillance. Ultimately she elected to proceed with surveillance.     #Stage IIb lung adenocarcinoma -   - Will STOP adjuvant atezolizumab (completed 6/17 cycles)  - Plan CT Chest with IV contrast q6m for now (last scan CTPE 2/2024), ordered today for August  - Discussed can consider earlier imaging if clinical symptoms but for now would keep at 6m    #Fatigue - was severe, now improved  #Diarrhea - variable in severity/frequency but overall baseline different compared to before starting immunotherapy  - TSH normal, recheck pending from today  - ACTH/cortisol normal  - S/p prednisone 40mg daily x5 days with improvement  - Hold immunotherapy as above    #Cough - s/p ED visits, possible viral URI  - Resolved    # Urinary frequency/trace hematuria 1+ -> (repeat UA) 0.03.  - Frequency symptom resolving  - Denies dysuria, trauma, recent UTI(s)  - No gross hematuria  - No hx of microscopic hematuria  - Will  follow-up with her PCP, may be related to post-menopausal state  - Renal ultrasound was normal  - Discussed can consider urology referral if persists      RTC in 3 months with scan review, encouraged her to call if any issues prior to follow-up    Keena Roy MD  [facility]

## 2024-05-20 ENCOUNTER — TELEPHONE (OUTPATIENT)
Dept: ADMISSION | Facility: HOSPITAL | Age: 64
End: 2024-05-20
Payer: MEDICARE

## 2024-05-20 NOTE — PROGRESS NOTES
"Betty Dueñas  is a 63 y.o. female who presents for evaluation of right upper lobe pulmonary cancer status postsurgical resection 6/5/2023.     This patient presents to medical attention with a calcium scoring CT scan which identified pulmonary nodules. She was referred for a CT scan which was performed on 2/14/2023 which showed a right upper lobe solid and groundglass lesion measuring 12 x 14 mm. She was referred for a PET/CT which was performed on 3/17/2023 which showed \"mild\" activity in this nodule with an SUV of 2.8 relative to the mediastinal blood pool of 2.1. I was concerned and recommended a CT-guided biopsy. The biopsy showed adenocarcinoma. She received a robotic right upper lobe segmental resection and mediastinal lymph node dissection on 6/5/2023.      This patient's pathology report showed a PT1N1 cancer. Margins negative. In setting of lymph node metastasis I recommended consideration of additional treatment. She was referred to medical oncology for adjuvant systemic treatment, which has also included immunotherapy. She is currently receiving this.    Currently the patient is {Usual state of health:89694}. She {CWT report deny:42943}. {WILDorBLANK:80896::\" \"}    {CWT [facility] stuff:43269}    She  reports that she has been smoking cigarettes. She has a 5 pack-year smoking history. She has never used smokeless tobacco. She reports that she does not currently use alcohol. She reports that she does not use drugs.    Objective   Physical Exam  {CWT exam:95042::\" \"}  Diagnostic Studies  {CWT reviewed:71213}    Assessment/Plan   Overall, I believe that the patient {CWT doing well:67033}.     {CWT plan smart text:36088}    I recommend {CWTworkup:80487}    I discussed this in detail with the patient, including a discussion of alternatives. They were comfortable with this approach.     Sam Spencer MD  [phone]      "

## 2024-05-20 NOTE — TELEPHONE ENCOUNTER
I called Dora back and told her team approved 7/30/24 to get labs and they will cover CT scan and office visit. She said Thank you.

## 2024-05-20 NOTE — TELEPHONE ENCOUNTER
Dora called nurse line and said she needs labs done prior to 8/1 CT Scan with IV contrast and also for 8/8 appointment with Dr. Roy. Can she get the labs done on 7/30/24 so they can be used for scan and office visit with Dr. Roy? Thanks. Messaged team.

## 2024-05-22 ENCOUNTER — APPOINTMENT (OUTPATIENT)
Dept: SURGERY | Facility: CLINIC | Age: 64
End: 2024-05-22
Payer: MEDICARE

## 2024-06-14 ENCOUNTER — NURSE TRIAGE (OUTPATIENT)
Dept: ADMISSION | Facility: HOSPITAL | Age: 64
End: 2024-06-14
Payer: MEDICARE

## 2024-06-14 DIAGNOSIS — Z92.89 HISTORY OF IMMUNOTHERAPY: ICD-10-CM

## 2024-06-14 DIAGNOSIS — R19.7 DIARRHEA, UNSPECIFIED TYPE: Primary | ICD-10-CM

## 2024-06-14 NOTE — TELEPHONE ENCOUNTER
The patient made aware of recommendations per Dr. Roy.   She was appreciative and utilized teach back, denied further questions and concerns at this time  Will call us back if anything worsens, will call scheduling next week to assist with getting set up with GI.   Will also go to ER if bleeding becomes severe or significant.

## 2024-06-14 NOTE — TELEPHONE ENCOUNTER
The patient called in, reports meeting with Dr. Roy 5/16 and letting her know about the diarrhea she had been having. States that the diarrhea has continued to get worse. States that she has about 4-5 episodes daily of soft/liquid diarrhea. Drinking well but not eating as much in attempts to slow down the diarrhea, she has now developed bright red blood (she thinks due to a hemmorhoid) with each movement. Reports not taking anything, wants to try imodium however wants to see if there are any further suggestions from the team. Please advise. Next follow up not until August.       Additional Information   Commented on: Are your bowel movements liquid or formed?     Combination of liquid and formed and very soft    Protocols used: Diarrhea

## 2024-06-17 NOTE — TELEPHONE ENCOUNTER
Patient called back and has Gastro new patient appointment on 8/5/24.  Patient is concerned this appointment is to far away.  All locations were checked for sooner appointments and nothing available - placed on waitlist.  Wanted team to be aware of soonest appointment.      Patient then states she will try to follow up with past GI doctor that did her colonoscopy 3 years ago.  At this time patient is unsure who GI doctor was and will follow up with PCP. Advised patient to call back to notify if she gets in sooner.  Patient agreed.

## 2024-06-19 DIAGNOSIS — R19.7 DIARRHEA, UNSPECIFIED TYPE: Primary | ICD-10-CM

## 2024-06-21 ENCOUNTER — TELEPHONE (OUTPATIENT)
Dept: GASTROENTEROLOGY | Facility: HOSPITAL | Age: 64
End: 2024-06-21
Payer: MEDICARE

## 2024-06-21 NOTE — TELEPHONE ENCOUNTER
Let's inform her of the stool tests to be done.  I should have some open slots around 4PM at [facility] in July.

## 2024-06-24 ENCOUNTER — APPOINTMENT (OUTPATIENT)
Dept: PRIMARY CARE | Facility: CLINIC | Age: 64
End: 2024-06-24
Payer: MEDICARE

## 2024-06-24 VITALS
SYSTOLIC BLOOD PRESSURE: 128 MMHG | HEART RATE: 66 BPM | BODY MASS INDEX: 24.45 KG/M2 | DIASTOLIC BLOOD PRESSURE: 68 MMHG | OXYGEN SATURATION: 94 % | WEIGHT: 138 LBS | TEMPERATURE: 97.5 F

## 2024-06-24 DIAGNOSIS — R19.7 DIARRHEA OF PRESUMED INFECTIOUS ORIGIN: Primary | ICD-10-CM

## 2024-06-24 DIAGNOSIS — K64.4 EXTERNAL HEMORRHOIDS: ICD-10-CM

## 2024-06-24 DIAGNOSIS — F17.210 CIGARETTE NICOTINE DEPENDENCE WITHOUT COMPLICATION: ICD-10-CM

## 2024-06-24 PROCEDURE — 1159F MED LIST DOCD IN RCRD: CPT | Performed by: FAMILY MEDICINE

## 2024-06-24 PROCEDURE — 3078F DIAST BP <80 MM HG: CPT | Performed by: FAMILY MEDICINE

## 2024-06-24 PROCEDURE — 1160F RVW MEDS BY RX/DR IN RCRD: CPT | Performed by: FAMILY MEDICINE

## 2024-06-24 PROCEDURE — 3074F SYST BP LT 130 MM HG: CPT | Performed by: FAMILY MEDICINE

## 2024-06-24 PROCEDURE — 1157F ADVNC CARE PLAN IN RCRD: CPT | Performed by: FAMILY MEDICINE

## 2024-06-24 PROCEDURE — 99214 OFFICE O/P EST MOD 30 MIN: CPT | Performed by: FAMILY MEDICINE

## 2024-06-24 RX ORDER — IBUPROFEN 200 MG
1 TABLET ORAL EVERY 24 HOURS
Qty: 30 PATCH | Refills: 0 | Status: SHIPPED | OUTPATIENT
Start: 2024-06-24 | End: 2024-07-24

## 2024-06-24 RX ORDER — NICOTINE 7MG/24HR
1 PATCH, TRANSDERMAL 24 HOURS TRANSDERMAL EVERY 24 HOURS
Qty: 30 PATCH | Refills: 0 | Status: SHIPPED | OUTPATIENT
Start: 2024-06-24 | End: 2024-07-24

## 2024-06-24 RX ORDER — HYDROCORTISONE 25 MG/G
CREAM TOPICAL 4 TIMES DAILY PRN
Qty: 30 G | Refills: 0 | Status: SHIPPED | OUTPATIENT
Start: 2024-06-24 | End: 2025-06-24

## 2024-06-24 NOTE — PROGRESS NOTES
Subjective   Patient ID: Dora Dueñas is a 63 y.o. female who presents for Follow-up (Loose stools x 6 weeks with blood/Rash on both wrists ).  Very pleasant 61-year-old with chronic diarrhea  Has history of lung cancer and has been on immunotherapy she finally stopped it she is not sure if the diarrhea is getting better had stopped the immunotherapy because of side effects today she had a semisolid stool but prior it has been multiple watery stools a day and she is still having a lot of cramping she felt miserable on the immunotherapy        Review of Systems  Constitutional: no chills, no fever and no night sweats.   Eyes: no blurred vision and no eyesight problems.   ENT: no hearing loss, no nasal congestion, no nasal discharge, no hoarseness and no sore throat.   Cardiovascular: no chest pain, no intermittent leg claudication, no lower extremity edema, no palpitations and no syncope.   Respiratory: no cough, no shortness of breath during exertion, no shortness of breath at rest and no wheezing.   Gastrointestinal: no abdominal pain, no blood in stools, no constipation, no diarrhea, no melena, no nausea, no rectal pain and no vomiting.   Genitourinary: no dysuria, no change in urinary frequency, no urinary hesitancy, no feelings of urinary urgency and no vaginal discharge.   Musculoskeletal: no arthralgias,  no back pain and no myalgias.   Integumentary: no new skin lesions and no rashes.   Neurological: no difficulty walking, no headache, no limb weakness, no numbness and no tingling.   Psychiatric: no anxiety, no depression, no anhedonia and no substance use disorders.   Endocrine: no recent weight gain and no recent weight loss.   Hematologic/Lymphatic: no tendency for easy bruising and no swollen glands .    Objective    /68   Pulse 66   Temp 36.4 °C (97.5 °F)   Wt 62.6 kg (138 lb)   SpO2 94%   BMI 24.45 kg/m²    Physical Exam  The patient appeared well nourished and normally developed. Vital  signs as documented. Head exam is unremarkable. No scleral icterus or corneal arcus noted.  Pupils are equal round reactive to light extraocular movements are intact no hemorrhages noted on funduscopic exam mouth mucous membranes are moist no exudates ears canals clear TMs are gray pearly not injected nose no rhinorrhea or epistaxis Neck is without jugular venous distension, thyromegaly, or carotid bruits. Carotid upstrokes are brisk bilaterally. Lungs are clear to auscultation and percussion. Cardiac exam reveals the PMI to be normally sized and situated. Rhythm is regular. First and second heart sounds normal. No murmurs, rubs or gallops. Abdominal exam reveals normal bowel sounds, no masses, no organomegaly and no aortic enlargement. Extremities are nonedematous and both femoral and pedal pulses are normal.  Neurologic exam DTRs are equal bilaterally no focal deficits strength is symmetrical heme lymph no palpable lymph nodes in the neck axilla or groin    Assessment/Plan   Problem List Items Addressed This Visit       Cigarette nicotine dependence without complication    Relevant Medications    nicotine (Nicoderm CQ) 21 mg/24 hr patch    nicotine (Nicoderm CQ) 7 mg/24 hr patch    nicotine (Nicoderm CQ) 14 mg/24 hr patch    Other Relevant Orders    Gastrointestinal Pathogen Panel, Real-Time PCR; [facility] [facility]; 37400 - Miscellaneous Test    External hemorrhoids    Relevant Medications    hydrocortisone (Anusol-HC) 2.5 % rectal cream    Other Relevant Orders    Gastrointestinal Pathogen Panel, Real-Time PCR; [facility]; 81058 - Miscellaneous Test    Diarrhea of presumed infectious origin - Primary    Relevant Orders    Stool Pathogen Panel, PCR    Ova/Para + Giardia/Cryptosporidium Antigen    C. difficile, PCR (Completed)    Gastrointestinal Pathogen Panel, Real-Time PCR; [facility]; 04800 - Miscellaneous Test            Sonia Vuong MD

## 2024-06-26 DIAGNOSIS — E78.2 MIXED HYPERLIPIDEMIA: ICD-10-CM

## 2024-06-27 PROCEDURE — 87329 GIARDIA AG IA: CPT | Performed by: FAMILY MEDICINE

## 2024-06-27 PROCEDURE — 87493 C DIFF AMPLIFIED PROBE: CPT | Performed by: FAMILY MEDICINE

## 2024-06-27 PROCEDURE — 87328 CRYPTOSPORIDIUM AG IA: CPT | Performed by: FAMILY MEDICINE

## 2024-06-28 LAB — C DIF TOX TCDA+TCDB STL QL NAA+PROBE: NOT DETECTED

## 2024-06-30 LAB
CRYPTOSP AG STL QL IA: NEGATIVE
G LAMBLIA AG STL QL IA: NEGATIVE

## 2024-07-01 PROBLEM — R19.7 DIARRHEA OF PRESUMED INFECTIOUS ORIGIN: Status: ACTIVE | Noted: 2024-07-01

## 2024-07-01 LAB — SCAN RESULT: NORMAL

## 2024-07-02 LAB — O+P STL MICRO: NEGATIVE

## 2024-07-25 NOTE — PROGRESS NOTES
"Betty Dueñas  is a 63 y.o. female who presents for evaluation of right upper lobe pulmonary cancer status postsurgical resection 6/5/2023.     This patient presents to medical attention with a calcium scoring CT scan which identified pulmonary nodules. She was referred for a CT scan which was performed on 2/14/2023 which showed a right upper lobe solid and groundglass lesion measuring 12 x 14 mm. She was referred for a PET/CT which was performed on 3/17/2023 which showed \"mild\" activity in this nodule with an SUV of 2.8 relative to the mediastinal blood pool of 2.1. I was concerned and recommended a CT-guided biopsy. The biopsy showed adenocarcinoma. She received a robotic right upper lobe segmental resection and mediastinal lymph node dissection on 6/5/2023. This patient's pathology report showed a PT1N1 cancer - margins negative. In setting of lymph node metastasis I recommended consideration of additional treatment. She was referred to medical oncology for adjuvant systemic treatment and had side effects of immunotherapy and stopped this.     Currently the patient is presenting for routine follow-up and in their usual state of health. She denies the following symptoms: chest pain, shortness of breath at rest, shortness of breath with activity, cough, hemoptysis, fevers, chills, and weight loss.  \"I feel good\"    There have been no significant changes to their documented medical, surgical and family history.     She  reports that she has been smoking cigarettes. She has a 5 pack-year smoking history. She has never used smokeless tobacco. She reports that she does not currently use alcohol. She reports that she does not use drugs.    Objective   Physical Exam  Phone visit  Diagnostic Studies  I reviewed a CT chest performed recently. I do not see any new or concerning nodules or adenopathy  === 08/01/24 ===    CT CHEST W IV CONTRAST    - Impression -  1.  Postsurgical changes in the right upper lobe " without evidence of  disease recurrence. Unchanged 4 mm nodule in the left upper lobe. No  suspicious nodules seen.    Signed by: William Gotti 8/2/2024 9:06 PM  Dictation workstation:   [id number]    Assessment/Plan   Overall, I believe that the patient has no evidence of cancer recurrence.     Based on the patient's clinical presentation and my review of their radiographic imaging, I believe they have no evidence of cancer recurrence.  I recommend ongoing radiographic screening.    I recommend CT chest in 6 months (ordered by medical oncology)    I discussed this in detail with the patient, including a discussion of alternatives. They were comfortable with this approach.     Sam Spencer MD  [phone]    Time: 11 min

## 2024-07-30 ENCOUNTER — DOCUMENTATION (OUTPATIENT)
Dept: HEMATOLOGY/ONCOLOGY | Facility: HOSPITAL | Age: 64
End: 2024-07-30

## 2024-07-30 ENCOUNTER — TELEPHONE (OUTPATIENT)
Dept: HEMATOLOGY/ONCOLOGY | Facility: HOSPITAL | Age: 64
End: 2024-07-30

## 2024-07-30 ENCOUNTER — LAB (OUTPATIENT)
Dept: LAB | Facility: LAB | Age: 64
End: 2024-07-30
Payer: MEDICARE

## 2024-07-30 DIAGNOSIS — E78.2 MIXED HYPERLIPIDEMIA: ICD-10-CM

## 2024-07-30 DIAGNOSIS — E03.9 HYPOTHYROIDISM (ACQUIRED): ICD-10-CM

## 2024-07-30 DIAGNOSIS — E87.6 ACUTE HYPOKALEMIA: Primary | ICD-10-CM

## 2024-07-30 DIAGNOSIS — C34.11 PRIMARY MALIGNANT NEOPLASM OF RIGHT UPPER LOBE OF LUNG (MULTI): ICD-10-CM

## 2024-07-30 LAB
ALBUMIN SERPL BCP-MCNC: 4.1 G/DL (ref 3.4–5)
ALP SERPL-CCNC: 73 U/L (ref 33–136)
ALT SERPL W P-5'-P-CCNC: 12 U/L (ref 7–45)
ANION GAP SERPL CALC-SCNC: 17 MMOL/L (ref 10–20)
AST SERPL W P-5'-P-CCNC: 14 U/L (ref 9–39)
BASOPHILS # BLD AUTO: 0.1 X10*3/UL (ref 0–0.1)
BASOPHILS NFR BLD AUTO: 1.3 %
BILIRUB SERPL-MCNC: 0.7 MG/DL (ref 0–1.2)
BUN SERPL-MCNC: 9 MG/DL (ref 6–23)
CALCIUM SERPL-MCNC: 9 MG/DL (ref 8.6–10.6)
CHLORIDE SERPL-SCNC: 102 MMOL/L (ref 98–107)
CHOLEST SERPL-MCNC: 182 MG/DL (ref 0–199)
CHOLESTEROL/HDL RATIO: 2.8
CO2 SERPL-SCNC: 23 MMOL/L (ref 21–32)
CREAT SERPL-MCNC: 0.82 MG/DL (ref 0.5–1.05)
EGFRCR SERPLBLD CKD-EPI 2021: 79 ML/MIN/1.73M*2
EOSINOPHIL # BLD AUTO: 0.14 X10*3/UL (ref 0–0.7)
EOSINOPHIL NFR BLD AUTO: 1.8 %
ERYTHROCYTE [DISTWIDTH] IN BLOOD BY AUTOMATED COUNT: 13.3 % (ref 11.5–14.5)
GLUCOSE SERPL-MCNC: 128 MG/DL (ref 74–99)
HCT VFR BLD AUTO: 39.4 % (ref 36–46)
HDLC SERPL-MCNC: 64.8 MG/DL
HGB BLD-MCNC: 12.6 G/DL (ref 12–16)
IMM GRANULOCYTES # BLD AUTO: 0.02 X10*3/UL (ref 0–0.7)
IMM GRANULOCYTES NFR BLD AUTO: 0.3 % (ref 0–0.9)
LDLC SERPL CALC-MCNC: 49 MG/DL
LYMPHOCYTES # BLD AUTO: 2.6 X10*3/UL (ref 1.2–4.8)
LYMPHOCYTES NFR BLD AUTO: 33.6 %
MCH RBC QN AUTO: 31 PG (ref 26–34)
MCHC RBC AUTO-ENTMCNC: 32 G/DL (ref 32–36)
MCV RBC AUTO: 97 FL (ref 80–100)
MONOCYTES # BLD AUTO: 0.54 X10*3/UL (ref 0.1–1)
MONOCYTES NFR BLD AUTO: 7 %
NEUTROPHILS # BLD AUTO: 4.34 X10*3/UL (ref 1.2–7.7)
NEUTROPHILS NFR BLD AUTO: 56 %
NON HDL CHOLESTEROL: 117 MG/DL (ref 0–149)
NRBC BLD-RTO: 0 /100 WBCS (ref 0–0)
PLATELET # BLD AUTO: 250 X10*3/UL (ref 150–450)
POTASSIUM SERPL-SCNC: 2.9 MMOL/L (ref 3.5–5.3)
PROT SERPL-MCNC: 7 G/DL (ref 6.4–8.2)
RBC # BLD AUTO: 4.06 X10*6/UL (ref 4–5.2)
SODIUM SERPL-SCNC: 139 MMOL/L (ref 136–145)
TRIGL SERPL-MCNC: 341 MG/DL (ref 0–149)
TSH SERPL-ACNC: 3.45 MIU/L (ref 0.44–3.98)
VLDL: 68 MG/DL (ref 0–40)
WBC # BLD AUTO: 7.7 X10*3/UL (ref 4.4–11.3)

## 2024-07-30 PROCEDURE — 84443 ASSAY THYROID STIM HORMONE: CPT

## 2024-07-30 PROCEDURE — 80061 LIPID PANEL: CPT

## 2024-07-30 PROCEDURE — 36415 COLL VENOUS BLD VENIPUNCTURE: CPT

## 2024-07-30 PROCEDURE — 85025 COMPLETE CBC W/AUTO DIFF WBC: CPT

## 2024-07-30 PROCEDURE — 80053 COMPREHEN METABOLIC PANEL: CPT

## 2024-07-30 RX ORDER — POTASSIUM CHLORIDE 20 MEQ/1
20 TABLET, EXTENDED RELEASE ORAL DAILY
Qty: 30 TABLET | Refills: 0 | Status: SHIPPED | OUTPATIENT
Start: 2024-07-30 | End: 2024-08-29

## 2024-07-30 NOTE — TELEPHONE ENCOUNTER
Spoke with PT after receiving lab result of 2.9 potassium. Dora stated she was away at a friend's in [facility] the past 2 weeks. She didn't eat much (friend had a tooth issue and couldn't eat so she didn't). I stressed the importance of a healthy appetite and the need for electrolyte balance. She stated she will get back to her normal eating habits now that she is home. She is aware provider sent in a potassium script. PT verbalized understanding to take 40mEvq today and 20mEvq daily starting tomorrow per MD instruction.    Per PT request and Dr. Roy approval, PT's gastro new patient visit cancelled for 8/5/20024. PT stated her diarrhea had resolved on it's own weeks ago.

## 2024-08-01 ENCOUNTER — HOSPITAL ENCOUNTER (OUTPATIENT)
Dept: RADIOLOGY | Facility: HOSPITAL | Age: 64
Discharge: HOME | End: 2024-08-01
Payer: MEDICARE

## 2024-08-01 DIAGNOSIS — C34.11 PRIMARY MALIGNANT NEOPLASM OF RIGHT UPPER LOBE OF LUNG (MULTI): ICD-10-CM

## 2024-08-01 PROCEDURE — 2550000001 HC RX 255 CONTRASTS: Performed by: INTERNAL MEDICINE

## 2024-08-01 PROCEDURE — 71260 CT THORAX DX C+: CPT | Performed by: RADIOLOGY

## 2024-08-01 PROCEDURE — 71260 CT THORAX DX C+: CPT

## 2024-08-05 ENCOUNTER — APPOINTMENT (OUTPATIENT)
Dept: GASTROENTEROLOGY | Facility: CLINIC | Age: 64
End: 2024-08-05
Payer: MEDICARE

## 2024-08-07 ENCOUNTER — TELEMEDICINE (OUTPATIENT)
Dept: SURGERY | Facility: CLINIC | Age: 64
End: 2024-08-07
Payer: MEDICARE

## 2024-08-07 DIAGNOSIS — C34.11 PRIMARY MALIGNANT NEOPLASM OF RIGHT UPPER LOBE OF LUNG (MULTI): Primary | ICD-10-CM

## 2024-08-07 PROCEDURE — 1157F ADVNC CARE PLAN IN RCRD: CPT | Performed by: THORACIC SURGERY (CARDIOTHORACIC VASCULAR SURGERY)

## 2024-08-07 PROCEDURE — 99442 PR PHYS/QHP TELEPHONE EVALUATION 11-20 MIN: CPT | Performed by: THORACIC SURGERY (CARDIOTHORACIC VASCULAR SURGERY)

## 2024-08-07 PROCEDURE — 1126F AMNT PAIN NOTED NONE PRSNT: CPT | Performed by: THORACIC SURGERY (CARDIOTHORACIC VASCULAR SURGERY)

## 2024-08-07 PROCEDURE — 1159F MED LIST DOCD IN RCRD: CPT | Performed by: THORACIC SURGERY (CARDIOTHORACIC VASCULAR SURGERY)

## 2024-08-07 RX ORDER — METHYLPREDNISOLONE 4 MG/1
TABLET ORAL
COMMUNITY

## 2024-08-07 RX ORDER — LOSARTAN POTASSIUM AND HYDROCHLOROTHIAZIDE 25; 100 MG/1; MG/1
TABLET ORAL
COMMUNITY

## 2024-08-07 ASSESSMENT — LIFESTYLE VARIABLES
AUDIT-C TOTAL SCORE: 0
HOW OFTEN DO YOU HAVE A DRINK CONTAINING ALCOHOL: NEVER
HOW OFTEN DO YOU HAVE SIX OR MORE DRINKS ON ONE OCCASION: NEVER
SKIP TO QUESTIONS 9-10: 1
HOW MANY STANDARD DRINKS CONTAINING ALCOHOL DO YOU HAVE ON A TYPICAL DAY: PATIENT DOES NOT DRINK

## 2024-08-07 ASSESSMENT — ENCOUNTER SYMPTOMS
DEPRESSION: 0
LOSS OF SENSATION IN FEET: 0
OCCASIONAL FEELINGS OF UNSTEADINESS: 0

## 2024-08-07 ASSESSMENT — PAIN SCALES - GENERAL: PAINLEVEL: 0-NO PAIN

## 2024-08-08 ENCOUNTER — APPOINTMENT (OUTPATIENT)
Dept: HEMATOLOGY/ONCOLOGY | Facility: CLINIC | Age: 64
End: 2024-08-08
Payer: MEDICARE

## 2024-09-05 ENCOUNTER — OFFICE VISIT (OUTPATIENT)
Dept: HEMATOLOGY/ONCOLOGY | Facility: CLINIC | Age: 64
End: 2024-09-05
Payer: MEDICARE

## 2024-09-05 ENCOUNTER — LAB (OUTPATIENT)
Dept: LAB | Facility: CLINIC | Age: 64
End: 2024-09-05
Payer: MEDICARE

## 2024-09-05 VITALS
OXYGEN SATURATION: 93 % | RESPIRATION RATE: 18 BRPM | HEART RATE: 54 BPM | WEIGHT: 143.3 LBS | DIASTOLIC BLOOD PRESSURE: 72 MMHG | BODY MASS INDEX: 25.38 KG/M2 | TEMPERATURE: 97.7 F | SYSTOLIC BLOOD PRESSURE: 182 MMHG

## 2024-09-05 DIAGNOSIS — C34.11 PRIMARY MALIGNANT NEOPLASM OF RIGHT UPPER LOBE OF LUNG (MULTI): ICD-10-CM

## 2024-09-05 DIAGNOSIS — E03.9 HYPOTHYROIDISM (ACQUIRED): ICD-10-CM

## 2024-09-05 LAB
ANION GAP SERPL CALC-SCNC: 15 MMOL/L (ref 10–20)
BUN SERPL-MCNC: 15 MG/DL (ref 6–23)
CALCIUM SERPL-MCNC: 9 MG/DL (ref 8.6–10.3)
CHLORIDE SERPL-SCNC: 103 MMOL/L (ref 98–107)
CO2 SERPL-SCNC: 26 MMOL/L (ref 21–32)
CREAT SERPL-MCNC: 0.82 MG/DL (ref 0.5–1.05)
EGFRCR SERPLBLD CKD-EPI 2021: 79 ML/MIN/1.73M*2
GLUCOSE SERPL-MCNC: 114 MG/DL (ref 74–99)
POTASSIUM SERPL-SCNC: 3.8 MMOL/L (ref 3.5–5.3)
SODIUM SERPL-SCNC: 140 MMOL/L (ref 136–145)

## 2024-09-05 PROCEDURE — 99213 OFFICE O/P EST LOW 20 MIN: CPT | Performed by: INTERNAL MEDICINE

## 2024-09-05 PROCEDURE — 1157F ADVNC CARE PLAN IN RCRD: CPT | Performed by: INTERNAL MEDICINE

## 2024-09-05 PROCEDURE — 1159F MED LIST DOCD IN RCRD: CPT | Performed by: INTERNAL MEDICINE

## 2024-09-05 PROCEDURE — 36415 COLL VENOUS BLD VENIPUNCTURE: CPT

## 2024-09-05 PROCEDURE — 3078F DIAST BP <80 MM HG: CPT | Performed by: INTERNAL MEDICINE

## 2024-09-05 PROCEDURE — 3077F SYST BP >= 140 MM HG: CPT | Performed by: INTERNAL MEDICINE

## 2024-09-05 PROCEDURE — 80048 BASIC METABOLIC PNL TOTAL CA: CPT

## 2024-09-05 PROCEDURE — 1126F AMNT PAIN NOTED NONE PRSNT: CPT | Performed by: INTERNAL MEDICINE

## 2024-09-05 ASSESSMENT — ENCOUNTER SYMPTOMS
OCCASIONAL FEELINGS OF UNSTEADINESS: 0
LOSS OF SENSATION IN FEET: 0
DEPRESSION: 0

## 2024-09-05 ASSESSMENT — PAIN SCALES - GENERAL: PAINLEVEL: 0-NO PAIN

## 2024-09-11 ASSESSMENT — ENCOUNTER SYMPTOMS
CONSTIPATION: 0
APPETITE CHANGE: 0
DIARRHEA: 0
CARDIOVASCULAR NEGATIVE: 1
COUGH: 0
RESPIRATORY NEGATIVE: 1
FREQUENCY: 0
SHORTNESS OF BREATH: 0
FEVER: 0
MUSCULOSKELETAL NEGATIVE: 1
NEUROLOGICAL NEGATIVE: 1
CONSTITUTIONAL NEGATIVE: 1
FATIGUE: 0
PSYCHIATRIC NEGATIVE: 1

## 2024-09-11 NOTE — PROGRESS NOTES
[facility] - Medical Oncology Follow-Up Visit    Patient ID: Dora Dueñas is a 64 y.o. female with stage IIb lung adenocarcinoma s/p RUL segmentectomy, with TP53 mutation and PD-L1 60%.     Current therapy: surveillance     Chief Concern: Here for follow-up and scan review    HPI: Patient present in clinic for follow-up. She feels really good - just came back from a vacation in [place]. No further diarrhea, eating well, energy is good. Breathing feels normal. No new concerns.    Diagnosis: Lung adenocarcinoma  Stage:  Cancer Staging   Primary malignant neoplasm of right upper lobe of lung (Multi)  Staging form: Lung, AJCC 8th Edition  - Pathologic: Stage IIB (pT1c, pN1, cM0) - Signed by Keena Roy MD on 9/25/2023     Current sites of disease: JULITO s/p RUL segmentectomy  Molecular and ancillary testing:   NGS with TP53 mutation  PD-L1 60%    Oncologic History:  1/11/23 - cardiac CT with RUL nodule  2/14/23 - CT chest with RUL nodule  3/17/23 - PET with FDG-avid RUL nodule no suspicious LNs  6/5/23 - RUL segmentectomy and LN dissection with pT1cN1 adenocarcinoma; 1 peribronchial LN positive for malignancy  8/7/23 -10/9/23 - adjuvant cisplatin/pemetrexed (completed 4 cycles)  11/10/23 - C1 adjuvant atezolizumab  2/6/24 - ED visit 2/2 SOB, increased cough (-) PE, work-up (-)  3/29/24 - C6 atezo, last dose per patient preference    Past Medical/Surgical Hx:   HTN  HLD  osteoarthritis  osteoporosis     Social Hx:   +tobacco use 1ppd since age 23, has quit at times in the past including recently prior to surgery, recently started again. Laser therapy helped for 3m  Lives at home with adult son whom she cares for (has autism) and her adult daughter who is a big support   Lots of friends and community who support her as well     Family Hx:   Paternal grandfather had lung cancer - was a smoker    Meds (Current):    Current Outpatient Medications:     ALPRAZolam (Xanax) 0.5 mg tablet, Take 1  tablet (0.5 mg) by mouth if needed., Disp: , Rfl:     atorvastatin (Lipitor) 40 mg tablet, Take 1 tablet (40 mg) by mouth once daily., Disp: 90 tablet, Rfl: 1    buPROPion XL (Wellbutrin XL) 150 mg 24 hr tablet, TAKE ONE TABLET BY MOUTH IN THE MORNING AS DIRECTED, Disp: , Rfl:     cholecalciferol (Vitamin D-3) 25 MCG (1000 UT) capsule, Take 1 capsule (25 mcg) by mouth once daily., Disp: , Rfl:     escitalopram (Lexapro) 20 mg tablet, TAKE TWO TABLETS BY MOUTH DAILY IN THE MORNING AS DIRECTED., Disp: , Rfl:     hydrocortisone (Anusol-HC) 2.5 % rectal cream, Insert into the rectum 4 times a day as needed for hemorrhoids (rectal discomfort). Apply to affected areas, Disp: 30 g, Rfl: 0    losartan (Cozaar) 100 mg tablet, Take 1 tablet (100 mg) by mouth once daily., Disp: 90 tablet, Rfl: 1    losartan-hydrochlorothiazide (Hyzaar) 100-25 mg tablet, Take by mouth., Disp: , Rfl:     methylPREDNISolone (Medrol Dospak) 4 mg tablets, TAKE AS DIRECTED ON PACK, Disp: , Rfl:     nicotine (Nicoderm CQ) 14 mg/24 hr patch, Place 1 patch over 24 hours on the skin once every 24 hours., Disp: 30 patch, Rfl: 0    nicotine (Nicoderm CQ) 21 mg/24 hr patch, Place 1 patch over 24 hours on the skin once every 24 hours., Disp: 30 patch, Rfl: 0    nicotine (Nicoderm CQ) 7 mg/24 hr patch, Place 1 patch over 24 hours on the skin once every 24 hours., Disp: 30 patch, Rfl: 0    Review of Systems   Constitutional: Negative.  Negative for appetite change, fatigue and fever.   HENT:  Negative.     Respiratory: Negative.  Negative for cough and shortness of breath.    Cardiovascular: Negative.    Gastrointestinal:  Negative for constipation and diarrhea.   Genitourinary: Negative.  Negative for frequency.    Musculoskeletal: Negative.    Skin: Negative.    Neurological: Negative.    Psychiatric/Behavioral: Negative.        Objective   BSA: 1.7 meters squared  BP (!) 182/72 (BP Location: Right arm, Patient Position: Sitting, BP Cuff Size: Adult)  Comment: Notified provider  Pulse 54   Temp 36.5 °C (97.7 °F) (Temporal)   Resp 18   Wt 65 kg (143 lb 4.8 oz)   SpO2 93%   BMI 25.38 kg/m²     Wt Readings from Last 5 Encounters:   09/05/24 65 kg (143 lb 4.8 oz)   06/24/24 62.6 kg (138 lb)   05/16/24 61.7 kg (136 lb 0.4 oz)   04/25/24 61.6 kg (135 lb 12.9 oz)   04/12/24 61.7 kg (136 lb)      Performance Status:  Asymptomatic - ECOG 0     Physical Exam  Vitals reviewed.   Constitutional:       Appearance: Normal appearance.   HENT:      Head: Normocephalic and atraumatic.      Nose: Nose normal.      Mouth/Throat:      Mouth: Mucous membranes are moist.      Pharynx: Oropharynx is clear.   Eyes:      Extraocular Movements: Extraocular movements intact.      Conjunctiva/sclera: Conjunctivae normal.      Pupils: Pupils are equal, round, and reactive to light.   Cardiovascular:      Rate and Rhythm: Normal rate and regular rhythm.      Pulses: Normal pulses.      Heart sounds: Normal heart sounds.   Pulmonary:      Effort: Pulmonary effort is normal.      Breath sounds: Normal breath sounds.   Abdominal:      General: Bowel sounds are normal. There is no distension.      Palpations: Abdomen is soft.   Musculoskeletal:         General: Normal range of motion.      Cervical back: Normal range of motion and neck supple.   Skin:     General: Skin is warm and dry.      Capillary Refill: Capillary refill takes less than 2 seconds.   Neurological:      General: No focal deficit present.      Mental Status: She is alert and oriented to person, place, and time.   Psychiatric:         Mood and Affect: Mood normal.         Behavior: Behavior normal.         Thought Content: Thought content normal.         Judgment: Judgment normal.        Results:  Labs:  Lab Results   Component Value Date    WBC 7.7 07/30/2024    HGB 12.6 07/30/2024    HCT 39.4 07/30/2024    MCV 97 07/30/2024     07/30/2024      Lab Results   Component Value Date    NEUTROABS 4.34 07/30/2024      Lab  Results   Component Value Date    GLUCOSE 114 (H) 09/05/2024    CALCIUM 9.0 09/05/2024     09/05/2024    K 3.8 09/05/2024    CO2 26 09/05/2024     09/05/2024    BUN 15 09/05/2024    CREATININE 0.82 09/05/2024    MG 1.76 02/06/2024     Lab Results   Component Value Date    ALT 12 07/30/2024    AST 14 07/30/2024    ALKPHOS 73 07/30/2024    BILITOT 0.7 07/30/2024      Imaging:   I personally reviewed the below imaging and concur with the findings as reported unless otherwise stated    === 08/01/24 ===  CT CHEST W IV CONTRAST  - Impression -  1.  Postsurgical changes in the right upper lobe without evidence of  disease recurrence. Unchanged 4 mm nodule in the left upper lobe. No  suspicious nodules seen.    Signed by: William Gotti 8/2/2024 9:06 PM  Dictation workstation:   [id number]    Pathology:  No new pathology.    Assessment/Plan      Dora Dueñas is a 64 y.o. female here for follow up of stage IIb lung adenocarcinoma with PD-L1 60% and no actionable mutations on NGS, s/p adjuvant chemotherapy after RUL segmentectomy.    She tolerated adjuvant chemotherapy well with progressive fatigue after the 4th cycle. She started adjuvant immunotherapy. Subsequently endorsed ongoing severe fatigue, intermittent diarrhea. Discussed that although not meeting clear criteria for IRAE it may be related to her immunotherapy. She was frustrated with how she feels and needed a break.     S/p 5 days of prednisone. Overall she felt much improved, glad to be able to do more and have more energy. Discussed options moving forward including retrial of immunotherapy, though with risk that fatigue will recur, vs proceeding with surveillance. Ultimately she elected to proceed with surveillance.    Doing well, scans with no evidence of recurrent disease.     #Stage IIb lung adenocarcinoma -   - Stopped adjuvant atezolizumab (completed 6/17 cycles)  - Plan CT Chest with IV contrast q6m for now, no evidence of recurrence  8/2024. Next scan ordered today    #Fatigue - was severe, now improved  #Diarrhea - now resolved  - TSH normal, recheck with next labs    # Urinary frequency/trace hematuria 1+ -> (repeat UA) 0.03.  - Frequency symptom resolving  - Denies dysuria, trauma, recent UTI(s)  - No gross hematuria  - No hx of microscopic hematuria  - Will follow-up with her PCP, may be related to post-menopausal state  - Renal ultrasound was normal  - Discussed can consider urology referral if persists      RTC in 6 months with scan review, encouraged her to call if any issues prior to follow-up    Keena Roy MD  [facility]

## 2024-09-22 ENCOUNTER — HOSPITAL ENCOUNTER (EMERGENCY)
Facility: HOSPITAL | Age: 64
Discharge: HOME | End: 2024-09-23
Payer: MEDICARE

## 2024-09-22 ENCOUNTER — APPOINTMENT (OUTPATIENT)
Dept: RADIOLOGY | Facility: HOSPITAL | Age: 64
End: 2024-09-22
Payer: MEDICARE
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
## 2024-09-22 DIAGNOSIS — F10.920 ALCOHOLIC INTOXICATION WITHOUT COMPLICATION (CMS-HCC): ICD-10-CM

## 2024-09-22 DIAGNOSIS — S42.214A CLOSED NONDISPLACED FRACTURE OF SURGICAL NECK OF RIGHT HUMERUS, UNSPECIFIED FRACTURE MORPHOLOGY, INITIAL ENCOUNTER: ICD-10-CM

## 2024-09-22 LAB
ALBUMIN SERPL BCP-MCNC: 4 G/DL (ref 3.4–5)
ALP SERPL-CCNC: 56 U/L (ref 33–136)
ALT SERPL W P-5'-P-CCNC: 11 U/L (ref 7–45)
ANION GAP SERPL CALC-SCNC: 12 MMOL/L (ref 10–20)
AST SERPL W P-5'-P-CCNC: 16 U/L (ref 9–39)
BASOPHILS # BLD AUTO: 0.09 X10*3/UL (ref 0–0.1)
BASOPHILS NFR BLD AUTO: 1.2 %
BILIRUB SERPL-MCNC: 0.5 MG/DL (ref 0–1.2)
BUN SERPL-MCNC: 10 MG/DL (ref 6–23)
CALCIUM SERPL-MCNC: 7.6 MG/DL (ref 8.6–10.3)
CHLORIDE SERPL-SCNC: 102 MMOL/L (ref 98–107)
CO2 SERPL-SCNC: 24 MMOL/L (ref 21–32)
CREAT SERPL-MCNC: 0.76 MG/DL (ref 0.5–1.05)
EGFRCR SERPLBLD CKD-EPI 2021: 87 ML/MIN/1.73M*2
EOSINOPHIL # BLD AUTO: 0.21 X10*3/UL (ref 0–0.7)
EOSINOPHIL NFR BLD AUTO: 2.8 %
ERYTHROCYTE [DISTWIDTH] IN BLOOD BY AUTOMATED COUNT: 12.9 % (ref 11.5–14.5)
ETHANOL SERPL-MCNC: 284 MG/DL
GLUCOSE SERPL-MCNC: 96 MG/DL (ref 74–99)
HCT VFR BLD AUTO: 33.6 % (ref 36–46)
HGB BLD-MCNC: 11.5 G/DL (ref 12–16)
IMM GRANULOCYTES # BLD AUTO: 0.04 X10*3/UL (ref 0–0.7)
IMM GRANULOCYTES NFR BLD AUTO: 0.5 % (ref 0–0.9)
LYMPHOCYTES # BLD AUTO: 3.67 X10*3/UL (ref 1.2–4.8)
LYMPHOCYTES NFR BLD AUTO: 49.3 %
MAGNESIUM SERPL-MCNC: 1.63 MG/DL (ref 1.6–2.4)
MCH RBC QN AUTO: 32.4 PG (ref 26–34)
MCHC RBC AUTO-ENTMCNC: 34.2 G/DL (ref 32–36)
MCV RBC AUTO: 95 FL (ref 80–100)
MONOCYTES # BLD AUTO: 0.38 X10*3/UL (ref 0.1–1)
MONOCYTES NFR BLD AUTO: 5.1 %
NEUTROPHILS # BLD AUTO: 3.06 X10*3/UL (ref 1.2–7.7)
NEUTROPHILS NFR BLD AUTO: 41.1 %
NRBC BLD-RTO: 0 /100 WBCS (ref 0–0)
PLATELET # BLD AUTO: 211 X10*3/UL (ref 150–450)
POTASSIUM SERPL-SCNC: 2.9 MMOL/L (ref 3.5–5.3)
PROT SERPL-MCNC: 6.4 G/DL (ref 6.4–8.2)
RBC # BLD AUTO: 3.55 X10*6/UL (ref 4–5.2)
SODIUM SERPL-SCNC: 135 MMOL/L (ref 136–145)
WBC # BLD AUTO: 7.5 X10*3/UL (ref 4.4–11.3)

## 2024-09-22 PROCEDURE — 73070 X-RAY EXAM OF ELBOW: CPT | Mod: RIGHT SIDE | Performed by: RADIOLOGY

## 2024-09-22 PROCEDURE — 96366 THER/PROPH/DIAG IV INF ADDON: CPT

## 2024-09-22 PROCEDURE — 2500000004 HC RX 250 GENERAL PHARMACY W/ HCPCS (ALT 636 FOR OP/ED): Performed by: PHYSICIAN ASSISTANT

## 2024-09-22 PROCEDURE — 85025 COMPLETE CBC W/AUTO DIFF WBC: CPT | Performed by: PHYSICIAN ASSISTANT

## 2024-09-22 PROCEDURE — 96375 TX/PRO/DX INJ NEW DRUG ADDON: CPT

## 2024-09-22 PROCEDURE — 36415 COLL VENOUS BLD VENIPUNCTURE: CPT | Performed by: PHYSICIAN ASSISTANT

## 2024-09-22 PROCEDURE — 2500000001 HC RX 250 WO HCPCS SELF ADMINISTERED DRUGS (ALT 637 FOR MEDICARE OP): Performed by: PHYSICIAN ASSISTANT

## 2024-09-22 PROCEDURE — 73030 X-RAY EXAM OF SHOULDER: CPT | Mod: RT

## 2024-09-22 PROCEDURE — 96365 THER/PROPH/DIAG IV INF INIT: CPT

## 2024-09-22 PROCEDURE — 72125 CT NECK SPINE W/O DYE: CPT

## 2024-09-22 PROCEDURE — 70450 CT HEAD/BRAIN W/O DYE: CPT

## 2024-09-22 PROCEDURE — 84075 ASSAY ALKALINE PHOSPHATASE: CPT | Performed by: PHYSICIAN ASSISTANT

## 2024-09-22 PROCEDURE — 2500000002 HC RX 250 W HCPCS SELF ADMINISTERED DRUGS (ALT 637 FOR MEDICARE OP, ALT 636 FOR OP/ED): Performed by: PHYSICIAN ASSISTANT

## 2024-09-22 PROCEDURE — 73070 X-RAY EXAM OF ELBOW: CPT | Mod: RT

## 2024-09-22 PROCEDURE — 83735 ASSAY OF MAGNESIUM: CPT | Performed by: PHYSICIAN ASSISTANT

## 2024-09-22 PROCEDURE — 82077 ASSAY SPEC XCP UR&BREATH IA: CPT | Performed by: PHYSICIAN ASSISTANT

## 2024-09-22 PROCEDURE — 70486 CT MAXILLOFACIAL W/O DYE: CPT

## 2024-09-22 PROCEDURE — 71045 X-RAY EXAM CHEST 1 VIEW: CPT | Performed by: RADIOLOGY

## 2024-09-22 PROCEDURE — 99285 EMERGENCY DEPT VISIT HI MDM: CPT

## 2024-09-22 PROCEDURE — 73030 X-RAY EXAM OF SHOULDER: CPT | Mod: RIGHT SIDE | Performed by: RADIOLOGY

## 2024-09-22 PROCEDURE — 76377 3D RENDER W/INTRP POSTPROCES: CPT

## 2024-09-22 PROCEDURE — 71045 X-RAY EXAM CHEST 1 VIEW: CPT

## 2024-09-22 RX ORDER — ONDANSETRON HYDROCHLORIDE 2 MG/ML
4 INJECTION, SOLUTION INTRAVENOUS ONCE
Status: COMPLETED | OUTPATIENT
Start: 2024-09-22 | End: 2024-09-22

## 2024-09-22 RX ORDER — POTASSIUM CHLORIDE 14.9 MG/ML
20 INJECTION INTRAVENOUS ONCE
Status: COMPLETED | OUTPATIENT
Start: 2024-09-22 | End: 2024-09-22

## 2024-09-22 RX ORDER — OXYCODONE AND ACETAMINOPHEN 5; 325 MG/1; MG/1
1 TABLET ORAL ONCE
Status: COMPLETED | OUTPATIENT
Start: 2024-09-22 | End: 2024-09-22

## 2024-09-22 RX ORDER — POTASSIUM CHLORIDE 20 MEQ/1
40 TABLET, EXTENDED RELEASE ORAL ONCE
Status: COMPLETED | OUTPATIENT
Start: 2024-09-22 | End: 2024-09-22

## 2024-09-22 RX ORDER — MORPHINE SULFATE 4 MG/ML
4 INJECTION, SOLUTION INTRAMUSCULAR; INTRAVENOUS ONCE
Status: COMPLETED | OUTPATIENT
Start: 2024-09-22 | End: 2024-09-22

## 2024-09-22 ASSESSMENT — COLUMBIA-SUICIDE SEVERITY RATING SCALE - C-SSRS
2. HAVE YOU ACTUALLY HAD ANY THOUGHTS OF KILLING YOURSELF?: NO
6. HAVE YOU EVER DONE ANYTHING, STARTED TO DO ANYTHING, OR PREPARED TO DO ANYTHING TO END YOUR LIFE?: NO
1. IN THE PAST MONTH, HAVE YOU WISHED YOU WERE DEAD OR WISHED YOU COULD GO TO SLEEP AND NOT WAKE UP?: NO

## 2024-09-22 ASSESSMENT — PAIN SCALES - GENERAL
PAINLEVEL_OUTOF10: 10 - WORST POSSIBLE PAIN

## 2024-09-22 ASSESSMENT — LIFESTYLE VARIABLES
TOTAL SCORE: 3
EVER HAD A DRINK FIRST THING IN THE MORNING TO STEADY YOUR NERVES TO GET RID OF A HANGOVER: YES
HAVE PEOPLE ANNOYED YOU BY CRITICIZING YOUR DRINKING: YES
HAVE YOU EVER FELT YOU SHOULD CUT DOWN ON YOUR DRINKING: YES
EVER FELT BAD OR GUILTY ABOUT YOUR DRINKING: NO

## 2024-09-22 ASSESSMENT — PAIN DESCRIPTION - PAIN TYPE: TYPE: ACUTE PAIN

## 2024-09-22 ASSESSMENT — PAIN - FUNCTIONAL ASSESSMENT
PAIN_FUNCTIONAL_ASSESSMENT: 0-10
PAIN_FUNCTIONAL_ASSESSMENT: 0-10

## 2024-09-22 ASSESSMENT — PAIN DESCRIPTION - LOCATION: LOCATION: ARM

## 2024-09-22 ASSESSMENT — PAIN DESCRIPTION - ORIENTATION: ORIENTATION: RIGHT

## 2024-09-22 NOTE — ED TRIAGE NOTES
BIBA [place] 4 post fall, per ems patient slipped on spilled water on her floor, and fell on her right side. -LOC, -thinners +hit head +etoh hx:etoh use, denies other medical hx

## 2024-09-23 VITALS
RESPIRATION RATE: 16 BRPM | DIASTOLIC BLOOD PRESSURE: 55 MMHG | BODY MASS INDEX: 23.92 KG/M2 | WEIGHT: 130 LBS | TEMPERATURE: 97.3 F | OXYGEN SATURATION: 94 % | SYSTOLIC BLOOD PRESSURE: 117 MMHG | HEART RATE: 65 BPM | HEIGHT: 62 IN

## 2024-09-23 RX ORDER — LIDOCAINE 560 MG/1
1 PATCH PERCUTANEOUS; TOPICAL; TRANSDERMAL DAILY
Qty: 5 PATCH | Refills: 0 | Status: SHIPPED | OUTPATIENT
Start: 2024-09-23 | End: 2024-09-28

## 2024-09-23 RX ORDER — OXYCODONE AND ACETAMINOPHEN 5; 325 MG/1; MG/1
1 TABLET ORAL EVERY 8 HOURS PRN
Qty: 5 TABLET | Refills: 0 | Status: SHIPPED | OUTPATIENT
Start: 2024-09-23 | End: 2024-09-26

## 2024-09-23 NOTE — ED PROVIDER NOTES
HPI   Chief Complaint   Patient presents with    Fall     BIBA [place] 4 post fall, per ems patient slipped on spilled water on her floor, and fell on her right side. -LOC, -thinners +hit head +etoh hx:etoh use, denies other medical hx       64-year-old female presents via EMS from home for injuries secondary to a fall.  Patient reportedly was drinking throughout the day today.  Patient stated that fell injuring her right upper extremity.  She also reports that she struck her head but denied any loss of consciousness.  She reports that she slipped on the floor which provoked the fall.  She denies any use of anticoagulation therapy.  Patient denies any pain or injury to her chest or abdomen.  She states that she has remained ambulatory.  EMS reported some bruising to the right ocular region.              Patient History   Past Medical History:   Diagnosis Date    Acute bronchitis, unspecified 11/17/2013    Acute bronchitis    Acute cystitis with hematuria 08/08/2021    Acute cystitis with hematuria    Acute sinusitis, unspecified 11/17/2013    Acute sinusitis    Acute upper respiratory infection, unspecified 11/17/2013    Upper respiratory infection    Anxiety disorder, unspecified 11/17/2013    Anxiety disorder    Anxiety disorder, unspecified 03/30/2021    Anxiety and depression    Cancer (Multi)     Colon, diverticulosis 03/30/2022    Dislocation of metacarpophalangeal joint of unspecified finger, initial encounter 04/03/2019    Closed dislocation of MCP joint of hand, initial encounter    Effusion, right hand 04/04/2019    Effusion of right hand    Follicular disorder, unspecified 11/17/2013    Folliculitis    Localized swelling, mass and lump, neck 06/10/2019    Mass of left side of neck    Nondisplaced fracture of neck of fourth metacarpal bone, right hand, initial encounter for closed fracture 02/26/2019    Closed nondisplaced fracture of neck of fourth metacarpal bone of right hand, initial encounter     Nondisplaced fracture of neck of fourth metacarpal bone, right hand, subsequent encounter for fracture with routine healing 04/30/2019    Closed nondisplaced fracture of neck of fourth metacarpal bone of right hand with routine healing    Other conditions influencing health status 11/17/2013    Lump Or Swelling In The Neck    Other fatigue 11/17/2013    Fatigue    Other general symptoms and signs 01/09/2022    Flu-like symptoms    Other general symptoms and signs 08/26/2020    Flu-like symptoms    Other long term (current) drug therapy 02/21/2015    Long-term use of Plaquenil    Other specified soft tissue disorders 06/28/2019    Soft tissue mass    Personal history of other diseases of the female genital tract 08/04/2021    History of vaginal pruritus    Personal history of other diseases of the female genital tract 08/18/2014    History of senile atrophic vaginitis    Personal history of other diseases of the respiratory system 06/28/2019    History of acute bronchitis with bronchospasm    Personal history of other diseases of the respiratory system 06/28/2019    History of sinusitis    Personal history of other infectious and parasitic diseases 09/08/2021    History of candidiasis of vagina    Personal history of other mental and behavioral disorders 06/28/2019    History of depression    Pneumonia, unspecified organism 11/17/2013    Pneumonitis    Pure hypercholesterolemia, unspecified 11/17/2013    Low-density-lipoid-type (LDL) hyperlipoproteinemia    Unspecified lump in unspecified breast     Benign breast lumps    Urinary frequency 06/27/2023     Past Surgical History:   Procedure Laterality Date    CT GUIDED PERCUTANEOUS BIOPSY LUNG  4/24/2023    CT GUIDED PERCUTANEOUS BIOPSY LUNG PAR CT    OTHER SURGICAL HISTORY  06/28/2019    Bunion Correction By Double Osteotomy    OTHER SURGICAL HISTORY  06/28/2019    Breast Lumpectomy Location    OTHER SURGICAL HISTORY  03/30/2021    Colonoscopy     Family History    Problem Relation Name Age of Onset    Diabetes type II Mother      Osteoporosis Mother      Rheum arthritis Mother      Stroke Mother      Diabetes type II Father      Heart disease Father      Gout Father      Hypertension Father      Lung cancer Paternal Grandfather       Social History     Tobacco Use    Smoking status: Some Days     Current packs/day: 0.25     Average packs/day: 0.3 packs/day for 20.0 years (5.0 ttl pk-yrs)     Types: Cigarettes    Smokeless tobacco: Never   Vaping Use    Vaping status: Never Used   Substance Use Topics    Alcohol use: Yes     Alcohol/week: 14.0 standard drinks of alcohol     Types: 14 Standard drinks or equivalent per week     Comment: every other day    Drug use: Never       Physical Exam   ED Triage Vitals [09/22/24 1956]   Temperature Heart Rate Respirations BP   36.3 °C (97.3 °F) 60 17 120/57      Pulse Ox Temp Source Heart Rate Source Patient Position   (!) 90 % Temporal Monitor Lying      BP Location FiO2 (%)     Left arm --       Physical Exam  Vitals and nursing note reviewed.   Constitutional:       General: She is not in acute distress.     Appearance: Normal appearance. She is normal weight. She is not ill-appearing, toxic-appearing or diaphoretic.   HENT:      Head: Normocephalic.      Nose: Nose normal.      Mouth/Throat:      Mouth: Mucous membranes are moist.   Eyes:      Extraocular Movements: Extraocular movements intact.      Conjunctiva/sclera: Conjunctivae normal.      Pupils: Pupils are equal, round, and reactive to light.      Comments: No entrapment   Neck:      Comments: No midline cervical thoracic or lumbar spine tenderness on exam  Cardiovascular:      Rate and Rhythm: Normal rate and regular rhythm.      Pulses: Normal pulses.   Pulmonary:      Effort: Pulmonary effort is normal. No respiratory distress.      Breath sounds: Normal breath sounds.   Abdominal:      General: Abdomen is flat. There is no distension.      Palpations: Abdomen is soft.       Tenderness: There is no abdominal tenderness. There is no guarding or rebound.   Musculoskeletal:         General: Normal range of motion.      Cervical back: Normal range of motion and neck supple.      Comments: Tenderness on palpation to the right anterolateral shoulder extending into the elbow without obvious deformity.  There is no other direct bony tenderness on examination of the extremities.  Patient is neurovascular intact throughout.   Skin:     General: Skin is warm and dry.      Capillary Refill: Capillary refill takes less than 2 seconds.   Neurological:      General: No focal deficit present.      Mental Status: She is alert and oriented to person, place, and time.   Psychiatric:         Mood and Affect: Mood normal.         Behavior: Behavior normal.         Thought Content: Thought content normal.         Judgment: Judgment normal.           ED Course & [facility]   ED Course as of 09/22/24 2312   Sun Sep 22, 2024   2203 IMPRESSION:  1. No acute cardiopulmonary process.  2. Acute right proximal humeral surgical neck fracture   [DS]   2203 IMPRESSION:  CT HEAD:  1. No acute intracranial abnormality or calvarial fracture.          CT MAXILLOFACIAL SKELETON:  1. No acute facial bone fracture.  2. Right-sided facial contusion with ill-defined fat stranding along  the pre maxillary fat pad and overlying the right zygomatic arch and  lateral orbital rim.      CT CERVICAL SPINE:  1. No acute fracture or traumatic malalignment of the cervical spine.  2. Spondylotic changes of the cervical spine as detailed above.   [DS]   2308 IMPRESSION:  Humeral head/neck fracture.   [DS]   2312 POTASSIUM(!!): 2.9 [DS]   2312 Alcohol(!): 284 [DS]      ED Course User Index  [DS] Robert Barcenas PA-C                 No data recorded     [place] Coma Scale Score: 15 (09/22/24 1953 : Brittany Wellington RN)                           Medical Decision Making  Patient was seen and evaluated for injury secondary to mechanical fall.   Patient was at home today when she slipped and fell due to spilled water.  Patient struck her head lose consciousness.  Additionally the patient injured her right shoulder.  Was remarkable for an alcohol level of 204.  Potassium was low at 2.9.  This was corrected.  Imaging studies revealed an acute right proximal humeral surgical neck fracture.  There is no other traumatic injury appreciated on imaging studies.  Patient was placed in a sling.  Her pain was treated.  I spoke with the patient's daughter who arrived at bedside.  Once patient is clinically sober she will be discharged home as long as her pain is controlled.  She will be given orthopedic follow-up.  Advised patient that she should discontinue her alcohol use.  Patient will be instructed to follow-up with her primary care physician for reassessment of her potassium level        Procedure  Procedures     Robert Barcenas PA-C  09/22/24 [id number]

## 2024-11-11 DIAGNOSIS — I10 HYPERTENSION: ICD-10-CM

## 2024-11-11 DIAGNOSIS — E78.2 MIXED HYPERLIPIDEMIA: ICD-10-CM

## 2024-11-11 RX ORDER — ATORVASTATIN CALCIUM 40 MG/1
40 TABLET, FILM COATED ORAL DAILY
Qty: 90 TABLET | Refills: 0 | Status: SHIPPED | OUTPATIENT
Start: 2024-11-11

## 2024-11-11 RX ORDER — LOSARTAN POTASSIUM 100 MG/1
100 TABLET ORAL DAILY
Qty: 90 TABLET | Refills: 0 | Status: SHIPPED | OUTPATIENT
Start: 2024-11-11

## 2025-01-29 ENCOUNTER — TELEPHONE (OUTPATIENT)
Dept: HEMATOLOGY/ONCOLOGY | Facility: CLINIC | Age: 65
End: 2025-01-29
Payer: MEDICARE

## 2025-01-29 NOTE — TELEPHONE ENCOUNTER
RN called and left a message for Dora letting her know that there are lab orders placed. However, if she is not going to a [facility] lab, then orders will have to be mailed to her. Clinic contact information was given for callback.

## 2025-01-30 ENCOUNTER — LAB (OUTPATIENT)
Dept: LAB | Facility: HOSPITAL | Age: 65
End: 2025-01-30
Payer: MEDICARE

## 2025-01-30 DIAGNOSIS — C34.11 PRIMARY MALIGNANT NEOPLASM OF RIGHT UPPER LOBE OF LUNG (MULTI): ICD-10-CM

## 2025-01-30 DIAGNOSIS — E03.9 HYPOTHYROIDISM, UNSPECIFIED: Primary | ICD-10-CM

## 2025-01-30 DIAGNOSIS — E03.9 HYPOTHYROIDISM (ACQUIRED): ICD-10-CM

## 2025-01-30 LAB
ALBUMIN SERPL BCP-MCNC: 4.6 G/DL (ref 3.4–5)
ALP SERPL-CCNC: 81 U/L (ref 33–136)
ALT SERPL W P-5'-P-CCNC: 17 U/L (ref 7–45)
ANION GAP SERPL CALC-SCNC: 17 MMOL/L (ref 10–20)
AST SERPL W P-5'-P-CCNC: 22 U/L (ref 9–39)
BASOPHILS # BLD AUTO: 0.09 X10*3/UL (ref 0–0.1)
BASOPHILS NFR BLD AUTO: 1.2 %
BILIRUB SERPL-MCNC: 0.8 MG/DL (ref 0–1.2)
BUN SERPL-MCNC: 12 MG/DL (ref 6–23)
CALCIUM SERPL-MCNC: 9.2 MG/DL (ref 8.6–10.6)
CHLORIDE SERPL-SCNC: 99 MMOL/L (ref 98–107)
CO2 SERPL-SCNC: 26 MMOL/L (ref 21–32)
CREAT SERPL-MCNC: 0.71 MG/DL (ref 0.5–1.05)
EGFRCR SERPLBLD CKD-EPI 2021: >90 ML/MIN/1.73M*2
EOSINOPHIL # BLD AUTO: 0.08 X10*3/UL (ref 0–0.7)
EOSINOPHIL NFR BLD AUTO: 1 %
ERYTHROCYTE [DISTWIDTH] IN BLOOD BY AUTOMATED COUNT: 13.6 % (ref 11.5–14.5)
GLUCOSE SERPL-MCNC: 124 MG/DL (ref 74–99)
HCT VFR BLD AUTO: 38.9 % (ref 36–46)
HGB BLD-MCNC: 12.6 G/DL (ref 12–16)
HOLD SPECIMEN: NORMAL
IMM GRANULOCYTES # BLD AUTO: 0.02 X10*3/UL (ref 0–0.7)
IMM GRANULOCYTES NFR BLD AUTO: 0.3 % (ref 0–0.9)
LYMPHOCYTES # BLD AUTO: 2.37 X10*3/UL (ref 1.2–4.8)
LYMPHOCYTES NFR BLD AUTO: 30.6 %
MCH RBC QN AUTO: 30.7 PG (ref 26–34)
MCHC RBC AUTO-ENTMCNC: 32.4 G/DL (ref 32–36)
MCV RBC AUTO: 95 FL (ref 80–100)
MONOCYTES # BLD AUTO: 0.41 X10*3/UL (ref 0.1–1)
MONOCYTES NFR BLD AUTO: 5.3 %
NEUTROPHILS # BLD AUTO: 4.78 X10*3/UL (ref 1.2–7.7)
NEUTROPHILS NFR BLD AUTO: 61.6 %
NRBC BLD-RTO: 0 /100 WBCS (ref 0–0)
PLATELET # BLD AUTO: 248 X10*3/UL (ref 150–450)
POTASSIUM SERPL-SCNC: 3.5 MMOL/L (ref 3.5–5.3)
PROT SERPL-MCNC: 7.5 G/DL (ref 6.4–8.2)
RBC # BLD AUTO: 4.11 X10*6/UL (ref 4–5.2)
SODIUM SERPL-SCNC: 138 MMOL/L (ref 136–145)
TSH SERPL-ACNC: 3.88 MIU/L (ref 0.44–3.98)
WBC # BLD AUTO: 7.8 X10*3/UL (ref 4.4–11.3)

## 2025-01-30 PROCEDURE — 80053 COMPREHEN METABOLIC PANEL: CPT

## 2025-01-30 PROCEDURE — 85025 COMPLETE CBC W/AUTO DIFF WBC: CPT

## 2025-01-30 PROCEDURE — 84443 ASSAY THYROID STIM HORMONE: CPT

## 2025-02-03 ENCOUNTER — HOSPITAL ENCOUNTER (OUTPATIENT)
Dept: RADIOLOGY | Facility: CLINIC | Age: 65
Discharge: HOME | End: 2025-02-03
Payer: MEDICAID

## 2025-02-03 DIAGNOSIS — C34.11 PRIMARY MALIGNANT NEOPLASM OF RIGHT UPPER LOBE OF LUNG (MULTI): ICD-10-CM

## 2025-02-03 PROCEDURE — 71260 CT THORAX DX C+: CPT

## 2025-02-03 PROCEDURE — 2550000001 HC RX 255 CONTRASTS: Performed by: INTERNAL MEDICINE

## 2025-02-03 RX ADMIN — IOHEXOL 75 ML: 350 INJECTION, SOLUTION INTRAVENOUS at 11:25

## 2025-02-06 ENCOUNTER — APPOINTMENT (OUTPATIENT)
Dept: HEMATOLOGY/ONCOLOGY | Facility: CLINIC | Age: 65
End: 2025-02-06
Payer: MEDICARE

## 2025-02-18 NOTE — PROGRESS NOTES
"Betty Dueñas  is a 64 y.o. female who presents for evaluation of right upper lobe pulmonary cancer status postsurgical resection 6/5/2023.     This patient presents to medical attention with a calcium scoring CT scan which identified pulmonary nodules. She was referred for a CT scan which was performed on 2/14/2023 which showed a right upper lobe solid and groundglass lesion measuring 12 x 14 mm. She was referred for a PET/CT which was performed on 3/17/2023 which showed \"mild\" activity in this nodule with an SUV of 2.8 relative to the mediastinal blood pool of 2.1. I was concerned and recommended a CT-guided biopsy. The biopsy showed adenocarcinoma. She received a robotic right upper lobe segmental resection and mediastinal lymph node dissection on 6/5/2023. This patient's pathology report showed a PT1N1 cancer - margins negative. In setting of lymph node metastasis I recommended consideration of additional treatment. She was referred to medical oncology for adjuvant systemic treatment and had side effects of immunotherapy and stopped this.     Currently the patient is {Usual state of health:75445}. She {CWT report deny:22032}. {WILDorBLANK:34776::\" \"}    {CWT [facility] stuff:34150}    She  reports that she has been smoking cigarettes. She has a 5 pack-year smoking history. She has never used smokeless tobacco. She reports current alcohol use of about 14.0 standard drinks of alcohol per week. She reports that she does not use drugs.    Objective   Physical Exam  {CWT exam:51922}  Diagnostic Studies  {CWT reviewed:10602}    Assessment/Plan   I believe that the patient {CWT doing well:53564}.     {CWT plan smart text:74511}    I recommend {CWTworkup:21747}    I discussed this in detail with the patient, including a discussion of alternatives. They were comfortable with this approach.     Sam Spencer MD  [phone]    "

## 2025-02-26 ENCOUNTER — APPOINTMENT (OUTPATIENT)
Dept: SURGERY | Facility: CLINIC | Age: 65
End: 2025-02-26
Payer: MEDICARE

## 2025-03-13 ENCOUNTER — APPOINTMENT (OUTPATIENT)
Dept: HEMATOLOGY/ONCOLOGY | Facility: CLINIC | Age: 65
End: 2025-03-13
Payer: MEDICARE

## 2025-03-20 ENCOUNTER — APPOINTMENT (OUTPATIENT)
Dept: HEMATOLOGY/ONCOLOGY | Facility: CLINIC | Age: 65
End: 2025-03-20
Payer: MEDICARE

## 2025-04-03 ENCOUNTER — OFFICE VISIT (OUTPATIENT)
Dept: HEMATOLOGY/ONCOLOGY | Facility: CLINIC | Age: 65
End: 2025-04-03
Payer: MEDICARE

## 2025-04-03 VITALS
OXYGEN SATURATION: 96 % | SYSTOLIC BLOOD PRESSURE: 131 MMHG | WEIGHT: 141.6 LBS | DIASTOLIC BLOOD PRESSURE: 72 MMHG | HEART RATE: 68 BPM | RESPIRATION RATE: 14 BRPM | BODY MASS INDEX: 25.9 KG/M2 | TEMPERATURE: 97.9 F

## 2025-04-03 DIAGNOSIS — C34.11 PRIMARY MALIGNANT NEOPLASM OF RIGHT UPPER LOBE OF LUNG (MULTI): ICD-10-CM

## 2025-04-03 PROCEDURE — 3078F DIAST BP <80 MM HG: CPT | Performed by: INTERNAL MEDICINE

## 2025-04-03 PROCEDURE — 3075F SYST BP GE 130 - 139MM HG: CPT | Performed by: INTERNAL MEDICINE

## 2025-04-03 PROCEDURE — 99213 OFFICE O/P EST LOW 20 MIN: CPT | Performed by: INTERNAL MEDICINE

## 2025-04-03 PROCEDURE — G2211 COMPLEX E/M VISIT ADD ON: HCPCS | Performed by: INTERNAL MEDICINE

## 2025-04-03 PROCEDURE — 1157F ADVNC CARE PLAN IN RCRD: CPT | Performed by: INTERNAL MEDICINE

## 2025-04-03 PROCEDURE — 1159F MED LIST DOCD IN RCRD: CPT | Performed by: INTERNAL MEDICINE

## 2025-04-03 PROCEDURE — 1126F AMNT PAIN NOTED NONE PRSNT: CPT | Performed by: INTERNAL MEDICINE

## 2025-04-03 ASSESSMENT — ENCOUNTER SYMPTOMS
FATIGUE: 0
MUSCULOSKELETAL NEGATIVE: 1
CONSTIPATION: 0
RESPIRATORY NEGATIVE: 1
CARDIOVASCULAR NEGATIVE: 1
PSYCHIATRIC NEGATIVE: 1
CONSTITUTIONAL NEGATIVE: 1
FREQUENCY: 0
DIARRHEA: 0
NEUROLOGICAL NEGATIVE: 1
COUGH: 0
SHORTNESS OF BREATH: 0
APPETITE CHANGE: 0
FEVER: 0

## 2025-04-03 ASSESSMENT — PAIN SCALES - GENERAL: PAINLEVEL_OUTOF10: 0-NO PAIN

## 2025-04-03 NOTE — PROGRESS NOTES
[facility] - Medical Oncology Follow-Up Visit    Patient ID: Dora Dueñas is a 64 y.o. female with stage IIb lung adenocarcinoma s/p RUL segmentectomy, with TP53 mutation and PD-L1 60%.     Current therapy: surveillance     Chief Concern: Here for follow-up and scan review    HPI: Patient present in clinic for follow-up. She feels really good - just came back from a vacation in [place]. No further diarrhea, eating well, energy is good. Breathing feels normal. No new concerns.    Diagnosis: Lung adenocarcinoma  Stage:  Cancer Staging   Primary malignant neoplasm of right upper lobe of lung (Multi)  Staging form: Lung, AJCC 8th Edition  - Pathologic: Stage IIB (pT1c, pN1, cM0) - Signed by Keena Roy MD on 9/25/2023     Current sites of disease: JULITO s/p RUL segmentectomy  Molecular and ancillary testing:   NGS with TP53 mutation  PD-L1 60%    Oncologic History:  1/11/23 - cardiac CT with RUL nodule  2/14/23 - CT chest with RUL nodule  3/17/23 - PET with FDG-avid RUL nodule no suspicious LNs  6/5/23 - RUL segmentectomy and LN dissection with pT1cN1 adenocarcinoma; 1 peribronchial LN positive for malignancy  8/7/23 -10/9/23 - adjuvant cisplatin/pemetrexed (completed 4 cycles)  11/10/23 - C1 adjuvant atezolizumab  2/6/24 - ED visit 2/2 SOB, increased cough (-) PE, work-up (-)  3/29/24 - C6 atezo, last dose per patient preference    Past Medical/Surgical Hx:   HTN  HLD  osteoarthritis  osteoporosis     Social Hx:   +tobacco use 1ppd since age 23, has quit at times in the past including recently prior to surgery, recently started again. Laser therapy helped for 3m  Lives at home with adult son whom she cares for (has autism) and her adult daughter who is a big support   Lots of friends and community who support her as well     Family Hx:   Paternal grandfather had lung cancer - was a smoker    Meds (Current):    Current Outpatient Medications:   •  ALPRAZolam (Xanax) 0.5 mg tablet, Take 1  tablet (0.5 mg) by mouth if needed., Disp: , Rfl:   •  atorvastatin (Lipitor) 40 mg tablet, TAKE ONE TABLET BY MOUTH DAILY, Disp: 90 tablet, Rfl: 0  •  buPROPion XL (Wellbutrin XL) 150 mg 24 hr tablet, TAKE ONE TABLET BY MOUTH IN THE MORNING AS DIRECTED, Disp: , Rfl:   •  cholecalciferol (Vitamin D-3) 25 MCG (1000 UT) capsule, Take 1 capsule (25 mcg) by mouth once daily., Disp: , Rfl:   •  escitalopram (Lexapro) 20 mg tablet, TAKE TWO TABLETS BY MOUTH DAILY IN THE MORNING AS DIRECTED., Disp: , Rfl:   •  hydrocortisone (Anusol-HC) 2.5 % rectal cream, Insert into the rectum 4 times a day as needed for hemorrhoids (rectal discomfort). Apply to affected areas, Disp: 30 g, Rfl: 0  •  losartan (Cozaar) 100 mg tablet, TAKE ONE TABLET BY MOUTH DAILY, Disp: 90 tablet, Rfl: 0  •  losartan-hydrochlorothiazide (Hyzaar) 100-25 mg tablet, Take by mouth., Disp: , Rfl:   •  methylPREDNISolone (Medrol Dospak) 4 mg tablets, TAKE AS DIRECTED ON PACK, Disp: , Rfl:   •  nicotine (Nicoderm CQ) 14 mg/24 hr patch, Place 1 patch over 24 hours on the skin once every 24 hours., Disp: 30 patch, Rfl: 0  •  nicotine (Nicoderm CQ) 21 mg/24 hr patch, Place 1 patch over 24 hours on the skin once every 24 hours., Disp: 30 patch, Rfl: 0  •  nicotine (Nicoderm CQ) 7 mg/24 hr patch, Place 1 patch over 24 hours on the skin once every 24 hours., Disp: 30 patch, Rfl: 0    Review of Systems   Constitutional: Negative.  Negative for appetite change, fatigue and fever.   HENT:  Negative.     Respiratory: Negative.  Negative for cough and shortness of breath.    Cardiovascular: Negative.    Gastrointestinal:  Negative for constipation and diarrhea.   Genitourinary: Negative.  Negative for frequency.    Musculoskeletal: Negative.    Skin: Negative.    Neurological: Negative.    Psychiatric/Behavioral: Negative.        Objective   BSA: 1.68 meters squared  /72 (BP Location: Left arm, Patient Position: Sitting)   Pulse 68   Temp 36.6 °C (97.9 °F)  (Temporal)   Resp 14   Wt 64.2 kg (141 lb 9.6 oz)   SpO2 96%   BMI 25.90 kg/m²     Wt Readings from Last 5 Encounters:   04/03/25 64.2 kg (141 lb 9.6 oz)   09/22/24 59 kg (130 lb)   09/05/24 65 kg (143 lb 4.8 oz)   06/24/24 62.6 kg (138 lb)   05/16/24 61.7 kg (136 lb 0.4 oz)      Performance Status:  Asymptomatic - ECOG 0     Physical Exam  Vitals reviewed.   Constitutional:       Appearance: Normal appearance.   HENT:      Head: Normocephalic and atraumatic.      Nose: Nose normal.      Mouth/Throat:      Mouth: Mucous membranes are moist.      Pharynx: Oropharynx is clear.   Eyes:      Extraocular Movements: Extraocular movements intact.      Conjunctiva/sclera: Conjunctivae normal.      Pupils: Pupils are equal, round, and reactive to light.   Cardiovascular:      Rate and Rhythm: Normal rate and regular rhythm.      Pulses: Normal pulses.      Heart sounds: Normal heart sounds.   Pulmonary:      Effort: Pulmonary effort is normal.      Breath sounds: Normal breath sounds.   Abdominal:      General: Bowel sounds are normal. There is no distension.      Palpations: Abdomen is soft.   Musculoskeletal:         General: Normal range of motion.      Cervical back: Normal range of motion and neck supple.   Skin:     General: Skin is warm and dry.      Capillary Refill: Capillary refill takes less than 2 seconds.   Neurological:      General: No focal deficit present.      Mental Status: She is alert and oriented to person, place, and time.   Psychiatric:         Mood and Affect: Mood normal.         Behavior: Behavior normal.         Thought Content: Thought content normal.         Judgment: Judgment normal.      Results:  Labs:  Lab Results   Component Value Date    WBC 7.8 01/30/2025    HGB 12.6 01/30/2025    HCT 38.9 01/30/2025    MCV 95 01/30/2025     01/30/2025      Lab Results   Component Value Date    NEUTROABS 4.78 01/30/2025      Lab Results   Component Value Date    GLUCOSE 124 (H) 01/30/2025     CALCIUM 9.2 01/30/2025     01/30/2025    K 3.5 01/30/2025    CO2 26 01/30/2025    CL 99 01/30/2025    BUN 12 01/30/2025    CREATININE 0.71 01/30/2025    MG 1.63 09/22/2024     Lab Results   Component Value Date    ALT 17 01/30/2025    AST 22 01/30/2025    ALKPHOS 81 01/30/2025    BILITOT 0.8 01/30/2025      Imaging:   I personally reviewed the below imaging and concur with the findings as reported unless otherwise stated    === 08/01/24 ===  CT CHEST W IV CONTRAST  - Impression -  1.  Postsurgical changes in the right upper lobe without evidence of  disease recurrence. Unchanged 4 mm nodule in the left upper lobe. No  suspicious nodules seen.    Signed by: William Gotti 8/2/2024 9:06 PM  Dictation workstation:   [id number]    Pathology:  No new pathology.    Assessment/Plan      Dora Dueñas is a 64 y.o. female here for follow up of stage IIb lung adenocarcinoma with PD-L1 60% and no actionable mutations on NGS, s/p adjuvant chemotherapy after RUL segmentectomy.    She tolerated adjuvant chemotherapy well with progressive fatigue after the 4th cycle. She started adjuvant immunotherapy. Subsequently endorsed ongoing severe fatigue, intermittent diarrhea. Discussed that although not meeting clear criteria for IRAE it may be related to her immunotherapy. She was frustrated with how she feels and needed a break.     S/p 5 days of prednisone. Overall she felt much improved, glad to be able to do more and have more energy. Discussed options moving forward including retrial of immunotherapy, though with risk that fatigue will recur, vs proceeding with surveillance. Ultimately she elected to proceed with surveillance.    Doing well, scans with no evidence of recurrent disease.     #Stage IIb lung adenocarcinoma -   - Stopped adjuvant atezolizumab (completed 6/17 cycles)  - Plan CT Chest with IV contrast q6m for now, no evidence of recurrence 8/2024. Next scan ordered today    #Fatigue - was severe, now  improved  #Diarrhea - now resolved  - TSH normal, recheck with next labs    # Urinary frequency/trace hematuria 1+ -> (repeat UA) 0.03.  - Frequency symptom resolving  - Denies dysuria, trauma, recent UTI(s)  - No gross hematuria  - No hx of microscopic hematuria  - Will follow-up with her PCP, may be related to post-menopausal state  - Renal ultrasound was normal  - Discussed can consider urology referral if persists      RTC in 6 months with scan review, encouraged her to call if any issues prior to follow-up    Keena Roy MD  [facility]

## 2025-04-07 NOTE — PROGRESS NOTES
"Betty Dueñas  is a 64 y.o. female who presents for evaluation of right upper lobe pulmonary cancer status postsurgical resection 6/5/2023.     This patient presents to medical attention with a calcium scoring CT scan which identified pulmonary nodules. She was referred for a CT scan which was performed on 2/14/2023 which showed a right upper lobe solid and groundglass lesion measuring 12 x 14 mm. She was referred for a PET/CT which was performed on 3/17/2023 which showed \"mild\" activity in this nodule with an SUV of 2.8 relative to the mediastinal blood pool of 2.1. I was concerned and recommended a CT-guided biopsy. The biopsy showed adenocarcinoma. She received a robotic right upper lobe segmental resection and mediastinal lymph node dissection on 6/5/2023. This patient's pathology report showed a pT1N1 cancer - margins negative. In setting of lymph node metastasis I recommended consideration of additional treatment. She was referred to medical oncology for adjuvant systemic treatment and had side effects of immunotherapy and stopped this.     Currently the patient is presenting for routine follow-up and in their usual state of health. She denies the following symptoms: chest pain, shortness of breath at rest, shortness of breath with activity, cough, hemoptysis, fevers, chills, and weight loss.  She fell on Feb 2nd and broke some ribs - now she is \"fine\".    There have been no significant changes to their documented medical, surgical and family history.     She  reports that she has been smoking cigarettes. She has a 5 pack-year smoking history. She has never used smokeless tobacco. She reports current alcohol use of about 14.0 standard drinks of alcohol per week. She reports that she does not use drugs.    Objective   Physical Exam  Phone visit (audio only evaluation)     Diagnostic Studies  === 02/03/25 ===    CT CHEST W IV CONTRAST    - Impression -  Multiple miniscule stable pulmonary " nodules.  Rib fractures left hemithorax as described above. No callus formation  to suggest healing. Stable compression of an upper thoracic vertebral  body since 2024. Old healed rib fractures of the right hemithorax.  Chronic fracture of the proximal right humerus.  Fibrotic changes right upper lobe with traction bronchiectasis.    Signed by: Catracho Massey 2/4/2025 6:11 PM  Dictation workstation:   [id number]    Assessment/Plan   I believe that the patient is doing well.     Based on the patient's clinical presentation and my review of their radiographic imaging, I believe they have no evidence of cancer recurrence.  I recommend ongoing radiographic screening.    WE discussed tobacco cessation    I recommend CT chest in 6 months (Chiec to order) with phone call visit.     I discussed this in detail with the patient, including a discussion of alternatives. They were comfortable with this approach.     Sam Spencer MD  [phone]  Time: 10min

## 2025-04-08 DIAGNOSIS — E78.2 MIXED HYPERLIPIDEMIA: ICD-10-CM

## 2025-04-08 NOTE — TELEPHONE ENCOUNTER
Pt called atorvastatin (Lipitor) 40 mg tablet   Send to :      [id number] - [place], [place] - [address]     LV:  6/24/24  NV:  7/2/25

## 2025-04-09 ENCOUNTER — TELEMEDICINE (OUTPATIENT)
Dept: SURGERY | Facility: CLINIC | Age: 65
End: 2025-04-09
Payer: MEDICARE

## 2025-04-09 DIAGNOSIS — C34.11 PRIMARY MALIGNANT NEOPLASM OF RIGHT UPPER LOBE OF LUNG (MULTI): Primary | ICD-10-CM

## 2025-04-09 RX ORDER — ATORVASTATIN CALCIUM 40 MG/1
40 TABLET, FILM COATED ORAL DAILY
Qty: 90 TABLET | Refills: 1 | Status: SHIPPED | OUTPATIENT
Start: 2025-04-09

## 2025-04-09 SDOH — ECONOMIC STABILITY: FOOD INSECURITY: WITHIN THE PAST 12 MONTHS, THE FOOD YOU BOUGHT JUST DIDN'T LAST AND YOU DIDN'T HAVE MONEY TO GET MORE.: NEVER TRUE

## 2025-04-09 SDOH — ECONOMIC STABILITY: FOOD INSECURITY: WITHIN THE PAST 12 MONTHS, YOU WORRIED THAT YOUR FOOD WOULD RUN OUT BEFORE YOU GOT MONEY TO BUY MORE.: NEVER TRUE

## 2025-04-09 ASSESSMENT — ENCOUNTER SYMPTOMS
DEPRESSION: 0
OCCASIONAL FEELINGS OF UNSTEADINESS: 0
LOSS OF SENSATION IN FEET: 0

## 2025-04-09 ASSESSMENT — PAIN SCALES - GENERAL: PAINLEVEL_OUTOF10: 0-NO PAIN

## 2025-04-23 ENCOUNTER — APPOINTMENT (OUTPATIENT)
Dept: PRIMARY CARE | Facility: CLINIC | Age: 65
End: 2025-04-23
Payer: MEDICARE

## 2025-07-02 ENCOUNTER — APPOINTMENT (OUTPATIENT)
Dept: PRIMARY CARE | Facility: CLINIC | Age: 65
End: 2025-07-02
Payer: MEDICARE

## 2025-07-02 VITALS — SYSTOLIC BLOOD PRESSURE: 120 MMHG | DIASTOLIC BLOOD PRESSURE: 68 MMHG

## 2025-07-02 DIAGNOSIS — Z12.11 COLON CANCER SCREENING: ICD-10-CM

## 2025-07-02 DIAGNOSIS — R73.03 PREDIABETES: ICD-10-CM

## 2025-07-02 DIAGNOSIS — Z12.31 ENCOUNTER FOR SCREENING MAMMOGRAM FOR BREAST CANCER: ICD-10-CM

## 2025-07-02 DIAGNOSIS — I10 PRIMARY HYPERTENSION: ICD-10-CM

## 2025-07-02 DIAGNOSIS — Z78.0 POSTMENOPAUSAL STATE: ICD-10-CM

## 2025-07-02 DIAGNOSIS — R39.9 UTI SYMPTOMS: ICD-10-CM

## 2025-07-02 DIAGNOSIS — E78.2 MIXED HYPERLIPIDEMIA: ICD-10-CM

## 2025-07-02 DIAGNOSIS — Z00.00 MEDICARE ANNUAL WELLNESS VISIT, SUBSEQUENT: Primary | ICD-10-CM

## 2025-07-02 LAB
POC APPEARANCE, URINE: CLEAR
POC BILIRUBIN, URINE: NEGATIVE
POC BLOOD, URINE: ABNORMAL
POC COLOR, URINE: YELLOW
POC GLUCOSE, URINE: NEGATIVE MG/DL
POC KETONES, URINE: NEGATIVE MG/DL
POC LEUKOCYTES, URINE: NEGATIVE
POC NITRITE,URINE: NEGATIVE
POC PH, URINE: 7 PH
POC PROTEIN, URINE: ABNORMAL MG/DL
POC SPECIFIC GRAVITY, URINE: 1.01
POC UROBILINOGEN, URINE: 0.2 EU/DL

## 2025-07-02 PROCEDURE — 1158F ADVNC CARE PLAN TLK DOCD: CPT | Performed by: FAMILY MEDICINE

## 2025-07-02 PROCEDURE — 3078F DIAST BP <80 MM HG: CPT | Performed by: FAMILY MEDICINE

## 2025-07-02 PROCEDURE — 1160F RVW MEDS BY RX/DR IN RCRD: CPT | Performed by: FAMILY MEDICINE

## 2025-07-02 PROCEDURE — 99213 OFFICE O/P EST LOW 20 MIN: CPT | Performed by: FAMILY MEDICINE

## 2025-07-02 PROCEDURE — 1159F MED LIST DOCD IN RCRD: CPT | Performed by: FAMILY MEDICINE

## 2025-07-02 PROCEDURE — 3074F SYST BP LT 130 MM HG: CPT | Performed by: FAMILY MEDICINE

## 2025-07-02 PROCEDURE — G0439 PPPS, SUBSEQ VISIT: HCPCS | Performed by: FAMILY MEDICINE

## 2025-07-02 PROCEDURE — 1123F ACP DISCUSS/DSCN MKR DOCD: CPT | Performed by: FAMILY MEDICINE

## 2025-07-02 PROCEDURE — 1170F FXNL STATUS ASSESSED: CPT | Performed by: FAMILY MEDICINE

## 2025-07-02 PROCEDURE — 81003 URINALYSIS AUTO W/O SCOPE: CPT | Performed by: FAMILY MEDICINE

## 2025-07-02 RX ORDER — NITROFURANTOIN 25; 75 MG/1; MG/1
100 CAPSULE ORAL 2 TIMES DAILY
Qty: 14 CAPSULE | Refills: 0 | Status: SHIPPED | OUTPATIENT
Start: 2025-07-02 | End: 2025-07-09

## 2025-07-02 ASSESSMENT — ACTIVITIES OF DAILY LIVING (ADL)
MANAGING_FINANCES: INDEPENDENT
BATHING: INDEPENDENT
DOING_HOUSEWORK: INDEPENDENT
GROCERY_SHOPPING: INDEPENDENT
DRESSING: INDEPENDENT
TAKING_MEDICATION: INDEPENDENT

## 2025-07-02 ASSESSMENT — ENCOUNTER SYMPTOMS
OCCASIONAL FEELINGS OF UNSTEADINESS: 0
DEPRESSION: 0
LOSS OF SENSATION IN FEET: 1

## 2025-07-02 NOTE — PROGRESS NOTES
Subjective   Reason for Visit: Dora Dueñas is an 64 y.o. female here for a Medicare Wellness visit.     Past Medical, Surgical, and Family History reviewed and updated in chart.    Reviewed all medications by prescribing practitioner or clinical pharmacist (such as prescriptions, OTCs, herbal therapies and supplements) and documented in the medical record.    64-year-old previous smoker here for annual Medicare wellness exam no recent hospital ER visits or surgeries history of lung cancer status post excision  Here for UTI symptoms and annual Medicare wellness exam    UTI   This is a new problem. The current episode started in the past 7 days. The problem occurs every urination. The problem has been gradually worsening. The quality of the pain is described as burning. The pain is mild. There has been no fever. She is Not sexually active. There is No history of pyelonephritis. Associated symptoms include frequency, hesitancy and urgency. Pertinent negatives include no chills, discharge, flank pain, hematuria, nausea, possible pregnancy, sweats or vomiting. She has tried nothing for the symptoms. The treatment provided no relief. There is no history of catheterization, kidney stones, recurrent UTIs, a single kidney, urinary stasis or a urological procedure.       Patient Self Assessment of Health Status  Patient Self Assessment: Good    Nutrition and Exercise  Current Diet: Well Balanced Diet  Adequate Fluid Intake: Yes  Caffeine: Yes  Exercise Frequency: Regularly    Functional Ability/Level of Safety  Cognitive Impairment Observed: No cognitive impairment observed  Cognitive Impairment Reported: No cognitive impairment reported by patient or family    Home Safety Risk Factors: None         Patient Care Team:  Sonia Vuong MD as PCP - General  Keena Roy MD as Consulting Physician (Hematology and Oncology)  Ina Doshi MD as Consulting Physician (Hematology and Oncology)     Review of Systems    Constitutional:  Negative for chills.   Gastrointestinal:  Negative for nausea and vomiting.   Genitourinary:  Positive for frequency, hesitancy and urgency. Negative for flank pain and hematuria.     Constitutional: no chills, no fever and no night sweats.   Eyes: no blurred vision and no eyesight problems.   ENT: no hearing loss, no nasal congestion, no nasal discharge, no hoarseness and no sore throat.   Cardiovascular: no chest pain, no intermittent leg claudication, no lower extremity edema, no palpitations and no syncope.   Respiratory: no cough, no shortness of breath during exertion, no shortness of breath at rest and no wheezing.   Gastrointestinal: no abdominal pain, no blood in stools, no constipation, no diarrhea, no melena, no nausea, no rectal pain and no vomiting.   Genitourinary: no dysuria, no change in urinary frequency, no urinary hesitancy, no feelings of urinary urgency and no vaginal discharge.   Musculoskeletal: no arthralgias,  no back pain and no myalgias.   Integumentary: no new skin lesions and no rashes.   Neurological: no difficulty walking, no headache, no limb weakness, no numbness and no tingling.   Psychiatric: no anxiety, no depression, no anhedonia and no substance use disorders.   Endocrine: no recent weight gain and no recent weight loss.   Hematologic/Lymphatic: no tendency for easy bruising and no swollen glands .    Medicare Wellness Billing Compliance Satisfied    *This is a visual tool to show completion of required items on the day of the visit. Green checks will only appear on the date of visit.      Objective   Vitals:  /68       Physical Exam  The patient appeared well nourished and normally developed. Vital signs as documented. Head exam is unremarkable. No scleral icterus or corneal arcus noted.  Pupils are equal round reactive to light extraocular movements are intact no hemorrhages noted on funduscopic exam mouth mucous membranes are moist no exudates ears  canals clear TMs are gray pearly not injected nose no rhinorrhea or epistaxis Neck is without jugular venous distension, thyromegaly, or carotid bruits. Carotid upstrokes are brisk bilaterally. Lungs are clear to auscultation and percussion. Cardiac exam reveals the PMI to be normally sized and situated. Rhythm is regular. First and second heart sounds normal. No murmurs, rubs or gallops. Abdominal exam reveals normal bowel sounds, no masses, no organomegaly and no aortic enlargement. Extremities are nonedematous and both femoral and pedal pulses are normal.  Neurologic exam DTRs are equal bilaterally no focal deficits strength is symmetrical heme lymph no palpable lymph nodes in the neck axilla or groin  Degenerative changes in the joints extremities no edema back to CVA tenderness   Assessment/Plan   Problem List Items Addressed This Visit       Mixed hyperlipidemia    Relevant Orders    Lipid Panel (Completed)    Primary hypertension    Relevant Orders    CBC and Auto Differential (Completed)    Comprehensive Metabolic Panel (Completed)    Prediabetes    Relevant Orders    Hemoglobin A1C (Completed)    Medicare annual wellness visit, subsequent - Primary    UTI symptoms    Relevant Orders    POCT UA Automated manually resulted (Completed)    Urine Culture (Completed)     Other Visit Diagnoses         Colon cancer screening        Relevant Orders    Colonoscopy Screening; Average Risk Patient      Encounter for screening mammogram for breast cancer        Relevant Orders    BI mammo bilateral screening tomosynthesis      Postmenopausal state        Relevant Orders    XR DEXA bone density

## 2025-07-03 LAB
ALBUMIN SERPL-MCNC: 4.5 G/DL (ref 3.6–5.1)
ALP SERPL-CCNC: 73 U/L (ref 37–153)
ALT SERPL-CCNC: 10 U/L (ref 6–29)
ANION GAP SERPL CALCULATED.4IONS-SCNC: 12 MMOL/L (CALC) (ref 7–17)
AST SERPL-CCNC: 13 U/L (ref 10–35)
BASOPHILS # BLD AUTO: 80 CELLS/UL (ref 0–200)
BASOPHILS NFR BLD AUTO: 1.1 %
BILIRUB SERPL-MCNC: 1 MG/DL (ref 0.2–1.2)
BUN SERPL-MCNC: 10 MG/DL (ref 7–25)
CALCIUM SERPL-MCNC: 9.1 MG/DL (ref 8.6–10.4)
CHLORIDE SERPL-SCNC: 104 MMOL/L (ref 98–110)
CHOLEST SERPL-MCNC: 220 MG/DL
CHOLEST/HDLC SERPL: 3.4 (CALC)
CO2 SERPL-SCNC: 25 MMOL/L (ref 20–32)
CREAT SERPL-MCNC: 0.75 MG/DL (ref 0.5–1.05)
EGFRCR SERPLBLD CKD-EPI 2021: 88 ML/MIN/1.73M2
EOSINOPHIL # BLD AUTO: 80 CELLS/UL (ref 15–500)
EOSINOPHIL NFR BLD AUTO: 1.1 %
ERYTHROCYTE [DISTWIDTH] IN BLOOD BY AUTOMATED COUNT: 13.9 % (ref 11–15)
EST. AVERAGE GLUCOSE BLD GHB EST-MCNC: 128 MG/DL
EST. AVERAGE GLUCOSE BLD GHB EST-SCNC: 7.1 MMOL/L
GLUCOSE SERPL-MCNC: 126 MG/DL (ref 65–99)
HBA1C MFR BLD: 6.1 %
HCT VFR BLD AUTO: 38.9 % (ref 35–45)
HDLC SERPL-MCNC: 64 MG/DL
HGB BLD-MCNC: 12.8 G/DL (ref 11.7–15.5)
LDLC SERPL CALC-MCNC: 109 MG/DL (CALC)
LYMPHOCYTES # BLD AUTO: 1723 CELLS/UL (ref 850–3900)
LYMPHOCYTES NFR BLD AUTO: 23.6 %
MCH RBC QN AUTO: 31.2 PG (ref 27–33)
MCHC RBC AUTO-ENTMCNC: 32.9 G/DL (ref 32–36)
MCV RBC AUTO: 94.9 FL (ref 80–100)
MONOCYTES # BLD AUTO: 409 CELLS/UL (ref 200–950)
MONOCYTES NFR BLD AUTO: 5.6 %
NEUTROPHILS # BLD AUTO: 5008 CELLS/UL (ref 1500–7800)
NEUTROPHILS NFR BLD AUTO: 68.6 %
NONHDLC SERPL-MCNC: 156 MG/DL (CALC)
PLATELET # BLD AUTO: 251 THOUSAND/UL (ref 140–400)
PMV BLD REES-ECKER: 10 FL (ref 7.5–12.5)
POTASSIUM SERPL-SCNC: 3.7 MMOL/L (ref 3.5–5.3)
PROT SERPL-MCNC: 7.4 G/DL (ref 6.1–8.1)
RBC # BLD AUTO: 4.1 MILLION/UL (ref 3.8–5.1)
SODIUM SERPL-SCNC: 141 MMOL/L (ref 135–146)
TRIGL SERPL-MCNC: 350 MG/DL
WBC # BLD AUTO: 7.3 THOUSAND/UL (ref 3.8–10.8)

## 2025-07-04 LAB — BACTERIA UR CULT: ABNORMAL

## 2025-07-07 DIAGNOSIS — C34.11 PRIMARY MALIGNANT NEOPLASM OF RIGHT UPPER LOBE OF LUNG (MULTI): ICD-10-CM

## 2025-07-18 DIAGNOSIS — I10 HYPERTENSION: ICD-10-CM

## 2025-07-18 RX ORDER — LOSARTAN POTASSIUM 100 MG/1
100 TABLET ORAL DAILY
Qty: 90 TABLET | Refills: 0 | Status: SHIPPED | OUTPATIENT
Start: 2025-07-18

## 2025-07-20 PROBLEM — J20.9 ACUTE BRONCHITIS: Status: RESOLVED | Noted: 2025-07-20 | Resolved: 2025-07-20

## 2025-07-20 PROBLEM — S42.291A CLOSED 3-PART FRACTURE OF PROXIMAL END OF RIGHT HUMERUS: Status: RESOLVED | Noted: 2024-09-25 | Resolved: 2025-07-20

## 2025-07-20 PROBLEM — R91.1 LUNG NODULE: Status: RESOLVED | Noted: 2025-07-20 | Resolved: 2025-07-20

## 2025-07-20 PROBLEM — J98.4 PNEUMONITIS: Status: RESOLVED | Noted: 2025-07-20 | Resolved: 2025-07-20

## 2025-07-20 PROBLEM — B49 INFECTION DUE TO FUNGUS: Status: RESOLVED | Noted: 2025-07-20 | Resolved: 2025-07-20

## 2025-07-20 PROBLEM — D24.9 BENIGN NEOPLASM OF BREAST: Status: RESOLVED | Noted: 2025-07-20 | Resolved: 2025-07-20

## 2025-07-20 PROBLEM — M25.511 ACUTE PAIN OF RIGHT SHOULDER: Status: RESOLVED | Noted: 2024-10-29 | Resolved: 2025-07-20

## 2025-07-20 PROBLEM — R53.83 FATIGUE: Status: RESOLVED | Noted: 2025-07-20 | Resolved: 2025-07-20

## 2025-07-20 PROBLEM — L25.9 CONTACT DERMATITIS: Status: RESOLVED | Noted: 2025-07-20 | Resolved: 2025-07-20

## 2025-07-20 PROBLEM — S42.291D: Status: RESOLVED | Noted: 2024-10-29 | Resolved: 2025-07-20

## 2025-07-20 PROBLEM — J01.90 ACUTE SINUSITIS: Status: RESOLVED | Noted: 2025-07-20 | Resolved: 2025-07-20

## 2025-07-20 PROBLEM — Z86.59 HISTORY OF DEPRESSION: Status: RESOLVED | Noted: 2025-07-20 | Resolved: 2025-07-20

## 2025-07-20 PROBLEM — L73.9 FOLLICULITIS: Status: RESOLVED | Noted: 2025-07-20 | Resolved: 2025-07-20

## 2025-07-20 PROBLEM — N89.8 PRURITUS OF VAGINA: Status: RESOLVED | Noted: 2025-07-20 | Resolved: 2025-07-20

## 2025-07-20 PROBLEM — K62.5 RECTAL BLEED: Status: RESOLVED | Noted: 2022-02-22 | Resolved: 2025-07-20

## 2025-07-20 ASSESSMENT — ACTIVITIES OF DAILY LIVING (ADL)
DOING_HOUSEWORK: INDEPENDENT
TAKING_MEDICATION: INDEPENDENT
DRESSING: INDEPENDENT
BATHING: INDEPENDENT
GROCERY_SHOPPING: INDEPENDENT
MANAGING_FINANCES: INDEPENDENT

## 2025-07-20 ASSESSMENT — ENCOUNTER SYMPTOMS
SWEATS: 0
NAUSEA: 0
HEMATURIA: 0
CHILLS: 0
FREQUENCY: 1
FLANK PAIN: 0
VOMITING: 0

## 2025-07-29 ENCOUNTER — OFFICE VISIT (OUTPATIENT)
Dept: URGENT CARE | Age: 65
End: 2025-07-29
Payer: MEDICARE

## 2025-07-29 VITALS
RESPIRATION RATE: 18 BRPM | OXYGEN SATURATION: 100 % | SYSTOLIC BLOOD PRESSURE: 147 MMHG | HEART RATE: 65 BPM | TEMPERATURE: 98 F | DIASTOLIC BLOOD PRESSURE: 73 MMHG

## 2025-07-29 DIAGNOSIS — H69.91 DYSFUNCTION OF RIGHT EUSTACHIAN TUBE: Primary | ICD-10-CM

## 2025-07-29 PROCEDURE — 3078F DIAST BP <80 MM HG: CPT | Performed by: HOSPITALIST

## 2025-07-29 PROCEDURE — 3077F SYST BP >= 140 MM HG: CPT | Performed by: HOSPITALIST

## 2025-07-29 PROCEDURE — 1159F MED LIST DOCD IN RCRD: CPT | Performed by: HOSPITALIST

## 2025-07-29 PROCEDURE — 99203 OFFICE O/P NEW LOW 30 MIN: CPT | Performed by: HOSPITALIST

## 2025-07-29 ASSESSMENT — ENCOUNTER SYMPTOMS: CONSTITUTIONAL NEGATIVE: 1

## 2025-07-29 NOTE — PROGRESS NOTES
Subjective   Patient ID: Dora Dueñas is a 64 y.o. female. They present today with a chief complaint of Ear Issue (Congestion, right ear blocked, flew on an airplane on sunday).    History of Present Illness  Pt sukhjinder 64 year old who recently flew and since getting of plane has felt her ear blocked          Past Medical History  Allergies as of 07/29/2025    (No Known Allergies)       Prescriptions Prior to Admission[1]     Medical History[2]    Surgical History[3]     reports that she has quit smoking. Her smoking use included cigarettes. She has a 5 pack-year smoking history. She has never used smokeless tobacco. She reports current alcohol use of about 14.0 standard drinks of alcohol per week. She reports that she does not use drugs.    Review of Systems  Review of Systems   Constitutional: Negative.    HENT:  Positive for ear pain.                                   Objective    Vitals:    07/29/25 1848   BP: 147/73   BP Location: Left arm   Patient Position: Sitting   Pulse: 65   Resp: 18   Temp: 36.7 °C (98 °F)   SpO2: 100%     No LMP recorded. (Menstrual status: Menopausal).    Physical Exam  Constitutional:       Appearance: Normal appearance.   HENT:      Ears:      Comments: Right ear drum with effusion and swelling of ear drum     Neurological:      Mental Status: She is alert.         Procedures    Point of Care Test & Imaging Results from this visit  No results found for this visit on 07/29/25.   Imaging  No results found.    Cardiology, Vascular, and Other Imaging  No other imaging results found for the past 2 days      Diagnostic study results (if any) were reviewed by Vannessa Givens MD.    Assessment/Plan   Allergies, medications, history, and pertinent labs/EKGs/Imaging reviewed by Vannessa Givens MD.     Medical Decision Making  Eustacian tube dysfunction    Orders and Diagnoses  Diagnoses and all orders for this visit:  Dysfunction of right eustachian tube      Medical Admin Record      Patient  disposition: Home    Electronically signed by Vannessa Givens MD  7:15 PM           [1] (Not in a hospital admission)   [2]   Past Medical History:  Diagnosis Date    Acute bronchitis 07/20/2025    Comment on above: Added by Problem List Migration; 2013-11-17; Moved to Suppressed Nov 23 2013 9:01PM;      Acute bronchitis, unspecified 11/17/2013    Acute bronchitis    Acute cystitis with hematuria 08/08/2021    Acute cystitis with hematuria    Acute pain of right shoulder 10/29/2024    Acute sinusitis 07/20/2025    Comment on above: Added by Problem List Migration; 2013-11-17; Moved to Suppressed Nov 23 2013 9:01PM;      Acute sinusitis, unspecified 11/17/2013    Acute sinusitis    Acute upper respiratory infection, unspecified 11/17/2013    Upper respiratory infection    Anxiety disorder, unspecified 11/17/2013    Anxiety disorder    Anxiety disorder, unspecified 03/30/2021    Anxiety and depression    Benign neoplasm of breast 07/20/2025    Cancer (Multi)     Closed 3-part fracture of proximal end of right humerus 09/25/2024    Colon, diverticulosis 03/30/2022    Contact dermatitis 07/20/2025    Dislocation of metacarpophalangeal joint of unspecified finger, initial encounter 04/03/2019    Closed dislocation of MCP joint of hand, initial encounter    Effusion, right hand 04/04/2019    Effusion of right hand    Fatigue 07/20/2025    Comment on above: Added by Problem List Migration; 2013-11-17; Moved to Suppressed Nov 23 2013 9:01PM;      Follicular disorder, unspecified 11/17/2013    Folliculitis    Folliculitis 07/20/2025    Comment on above: Added by Problem List Migration; 2013-11-17; Moved to Suppressed Nov 23 2013 9:01PM;      History of depression 07/20/2025    Infection due to fungus 07/20/2025    Localized swelling, mass and lump, neck 06/10/2019    Mass of left side of neck    Lung nodule 07/20/2025    Nondisplaced fracture of neck of fourth metacarpal bone, right hand, initial encounter for closed  fracture 02/26/2019    Closed nondisplaced fracture of neck of fourth metacarpal bone of right hand, initial encounter    Nondisplaced fracture of neck of fourth metacarpal bone, right hand, subsequent encounter for fracture with routine healing 04/30/2019    Closed nondisplaced fracture of neck of fourth metacarpal bone of right hand with routine healing    Other conditions influencing health status 11/17/2013    Lump Or Swelling In The Neck    Other displaced fracture of upper end of right humerus, subsequent encounter for fracture with routine healing 10/29/2024    Other fatigue 11/17/2013    Fatigue    Other general symptoms and signs 01/09/2022    Flu-like symptoms    Other general symptoms and signs 08/26/2020    Flu-like symptoms    Other long term (current) drug therapy 02/21/2015    Long-term use of Plaquenil    Other specified soft tissue disorders 06/28/2019    Soft tissue mass    Personal history of other diseases of the female genital tract 08/04/2021    History of vaginal pruritus    Personal history of other diseases of the female genital tract 08/18/2014    History of senile atrophic vaginitis    Personal history of other diseases of the respiratory system 06/28/2019    History of acute bronchitis with bronchospasm    Personal history of other diseases of the respiratory system 06/28/2019    History of sinusitis    Personal history of other infectious and parasitic diseases 09/08/2021    History of candidiasis of vagina    Personal history of other mental and behavioral disorders 06/28/2019    History of depression    Pneumonia, unspecified organism 11/17/2013    Pneumonitis    Pneumonitis 07/20/2025    Comment on above: Added by Problem List Migration; 2013-11-17; Moved to [facility] Nov 23 2013 9:01PM;      Pruritus of vagina 07/20/2025    Pure hypercholesterolemia, unspecified 11/17/2013    Low-density-lipoid-type (LDL) hyperlipoproteinemia    Rectal bleed 02/22/2022    Unspecified lump in  unspecified breast     Benign breast lumps    Urinary frequency 06/27/2023   [3]   Past Surgical History:  Procedure Laterality Date    CT GUIDED PERCUTANEOUS BIOPSY LUNG  4/24/2023    CT GUIDED PERCUTANEOUS BIOPSY LUNG PAR CT    OTHER SURGICAL HISTORY  06/28/2019    Bunion Correction By Double Osteotomy    OTHER SURGICAL HISTORY  06/28/2019    Breast Lumpectomy Location    OTHER SURGICAL HISTORY  03/30/2021    Colonoscopy

## 2025-07-31 ENCOUNTER — LAB (OUTPATIENT)
Dept: LAB | Facility: HOSPITAL | Age: 65
End: 2025-07-31
Payer: MEDICARE

## 2025-07-31 DIAGNOSIS — C34.11 MALIGNANT NEOPLASM OF UPPER LOBE, RIGHT BRONCHUS OR LUNG: Primary | ICD-10-CM

## 2025-07-31 LAB
ALBUMIN SERPL BCP-MCNC: 4.3 G/DL (ref 3.4–5)
ALP SERPL-CCNC: 95 U/L (ref 33–136)
ALT SERPL W P-5'-P-CCNC: 48 U/L (ref 7–45)
ANION GAP SERPL CALC-SCNC: 17 MMOL/L (ref 10–20)
AST SERPL W P-5'-P-CCNC: 35 U/L (ref 9–39)
BASOPHILS # BLD AUTO: 0.07 X10*3/UL (ref 0–0.1)
BASOPHILS NFR BLD AUTO: 0.7 %
BILIRUB SERPL-MCNC: 0.6 MG/DL (ref 0–1.2)
BUN SERPL-MCNC: 12 MG/DL (ref 6–23)
CALCIUM SERPL-MCNC: 8.4 MG/DL (ref 8.6–10.3)
CHLORIDE SERPL-SCNC: 98 MMOL/L (ref 98–107)
CO2 SERPL-SCNC: 25 MMOL/L (ref 21–32)
CREAT SERPL-MCNC: 0.86 MG/DL (ref 0.5–1.05)
EGFRCR SERPLBLD CKD-EPI 2021: 75 ML/MIN/1.73M*2
EOSINOPHIL # BLD AUTO: 0.12 X10*3/UL (ref 0–0.7)
EOSINOPHIL NFR BLD AUTO: 1.2 %
ERYTHROCYTE [DISTWIDTH] IN BLOOD BY AUTOMATED COUNT: 13.3 % (ref 11.5–14.5)
GLUCOSE SERPL-MCNC: 93 MG/DL (ref 74–99)
HCT VFR BLD AUTO: 36.7 % (ref 36–46)
HGB BLD-MCNC: 12.2 G/DL (ref 12–16)
IMM GRANULOCYTES # BLD AUTO: 0.07 X10*3/UL (ref 0–0.7)
IMM GRANULOCYTES NFR BLD AUTO: 0.7 % (ref 0–0.9)
LYMPHOCYTES # BLD AUTO: 3.13 X10*3/UL (ref 1.2–4.8)
LYMPHOCYTES NFR BLD AUTO: 31.6 %
MCH RBC QN AUTO: 31.8 PG (ref 26–34)
MCHC RBC AUTO-ENTMCNC: 33.2 G/DL (ref 32–36)
MCV RBC AUTO: 96 FL (ref 80–100)
MONOCYTES # BLD AUTO: 0.54 X10*3/UL (ref 0.1–1)
MONOCYTES NFR BLD AUTO: 5.5 %
NEUTROPHILS # BLD AUTO: 5.96 X10*3/UL (ref 1.2–7.7)
NEUTROPHILS NFR BLD AUTO: 60.3 %
NRBC BLD-RTO: 0 /100 WBCS (ref 0–0)
PLATELET # BLD AUTO: 261 X10*3/UL (ref 150–450)
POTASSIUM SERPL-SCNC: 3.6 MMOL/L (ref 3.5–5.3)
PROT SERPL-MCNC: 7.1 G/DL (ref 6.4–8.2)
RBC # BLD AUTO: 3.84 X10*6/UL (ref 4–5.2)
SODIUM SERPL-SCNC: 136 MMOL/L (ref 136–145)
WBC # BLD AUTO: 9.9 X10*3/UL (ref 4.4–11.3)

## 2025-07-31 PROCEDURE — 80053 COMPREHEN METABOLIC PANEL: CPT

## 2025-07-31 PROCEDURE — 36415 COLL VENOUS BLD VENIPUNCTURE: CPT

## 2025-07-31 PROCEDURE — 85025 COMPLETE CBC W/AUTO DIFF WBC: CPT

## 2025-08-01 ENCOUNTER — HOSPITAL ENCOUNTER (OUTPATIENT)
Dept: RADIOLOGY | Facility: CLINIC | Age: 65
Discharge: HOME | End: 2025-08-01
Payer: MEDICARE

## 2025-08-01 DIAGNOSIS — C34.11 PRIMARY MALIGNANT NEOPLASM OF RIGHT UPPER LOBE OF LUNG (MULTI): ICD-10-CM

## 2025-08-01 PROCEDURE — 71260 CT THORAX DX C+: CPT

## 2025-08-01 PROCEDURE — 2550000001 HC RX 255 CONTRASTS: Mod: JW | Performed by: INTERNAL MEDICINE

## 2025-08-01 RX ADMIN — IOHEXOL 75 ML: 350 INJECTION, SOLUTION INTRAVENOUS at 11:25

## 2025-08-01 NOTE — PROGRESS NOTES
"Betty Dueñas  is a 64 y.o. female who presents for evaluation of right upper lobe pulmonary cancer status postsurgical resection 6/5/2023.     This patient presents to medical attention with a calcium scoring CT scan which identified pulmonary nodules. She was referred for a CT scan which was performed on 2/14/2023 which showed a right upper lobe solid and groundglass lesion measuring 12 x 14 mm. She was referred for a PET/CT which was performed on 3/17/2023 which showed \"mild\" activity in this nodule with an SUV of 2.8 relative to the mediastinal blood pool of 2.1. I was concerned and recommended a CT-guided biopsy. The biopsy showed adenocarcinoma. She received a robotic right upper lobe segmental resection and mediastinal lymph node dissection on 6/5/2023. This patient's pathology report showed a pT1N1 cancer - margins negative. In setting of lymph node metastasis I recommended consideration of additional treatment. She was referred to medical oncology for adjuvant systemic treatment and had side effects of immunotherapy and stopped this.     Currently the patient is presenting for routine follow-up. She was feeling ill the week prior to  the scan , feeling congested and \"coughing stuff up\". She was feeling warm as if she had a fever. She treated this at first with afrin, but got a CT which suggested \"pneumonia\" and she started taking levofloxacin, and now she is feeling.  She denies the following symptoms: chest pain, shortness of breath at rest, hemoptysis, and weight loss.      There have been no significant changes to their documented medical, surgical and family history.     She  reports that she has quit smoking. Her smoking use included cigarettes. She has a 5 pack-year smoking history. She has never used smokeless tobacco. She reports current alcohol use of about 14.0 standard drinks of alcohol per week. She reports that she does not use drugs.    Objective   Physical Exam  Phone visit " (audio only evaluation - patient declined request to have a video visit)   Diagnostic Studies  === 08/01/25 ===    CT CHEST W IV CONTRAST    - Impression -  1. Right upper lobe segmentectomy changes without suspicious  nodularity along the suture margins to suggest local recurrence.  2. Interval development of a 9 mm irregular opacity in the right  upper lobe favored to represent atelectasis or pneumonia. Attention  on follow-up.  3. Severe coronary artery calcifications. Study is not optimized for  evaluation of coronary arteries.      MACRO:  None.    Signed by: Sarah Beth Gallagher 8/2/2025 10:27 AM  Dictation workstation:   [id number]    Assessment/Plan   I believe that the patient is doing well.     Based on the patient's clinical presentation and my review of their radiographic imaging, I believe they have no evidence of cancer recurrence.  I recommend ongoing radiographic screening.    Based on the patient's clinical presentation and available radiographic imaging, I believe the nodule is likely benign, but I cannot rule out the possibility of malignancy.  I concur with radiology is is infectious or inflammatory  Based on this discussion, the patient elected for CT 3 months.    I recommend CT chest in 3 months phone call     I discussed this in detail with the patient, including a discussion of alternatives. They were comfortable with this approach.     Sam Spencer MD  [phone]  They declined video call, consent obtained, Time: 10  min.

## 2025-08-04 ENCOUNTER — TELEPHONE (OUTPATIENT)
Dept: PRIMARY CARE | Facility: CLINIC | Age: 65
End: 2025-08-04
Payer: MEDICARE

## 2025-08-04 ENCOUNTER — NURSE TRIAGE (OUTPATIENT)
Dept: HEMATOLOGY/ONCOLOGY | Facility: HOSPITAL | Age: 65
End: 2025-08-04
Payer: MEDICARE

## 2025-08-04 DIAGNOSIS — J18.9 PNEUMONIA OF RIGHT UPPER LOBE DUE TO INFECTIOUS ORGANISM: Primary | ICD-10-CM

## 2025-08-04 RX ORDER — LEVOFLOXACIN 750 MG/1
750 TABLET, FILM COATED ORAL DAILY
Qty: 5 TABLET | Refills: 0 | Status: SHIPPED | OUTPATIENT
Start: 2025-08-04 | End: 2025-08-09

## 2025-08-04 NOTE — TELEPHONE ENCOUNTER
Pt was diagnosed with pneumonia in upper right lung from CT scan and was wondering if you can send something in or if she needs to be seen , results are in her chart she says

## 2025-08-04 NOTE — TELEPHONE ENCOUNTER
Called patient back. Her COVID test was negative.    She is asking if we can send antibiotics in to [facility] [address] in [place].    Message sent to team.  
Patient states that she was in [place] for vacation and returned congested with a cough. She has had these symptoms now for 7 days.    She had a CT scan and viewed her results online. She states that it showed pneumonia in her right upper lobe.    She did call her PCP as well but has not heard back.    She went to an urgent care a few days ago. They did not swab her but recommended Flonase and Afrin nasal sprays for the ear fullness and congestion.    She endorses feeling feverish but has not taken her temperature and productive cough at night with yellow sputum. Denies SOB, chest pain, hemoptysis, fatigue or N/V.    She is unsure about sick contacts.    She does have a COVID test at home. Advised that she take that test and I will call back in about 30 minutes.     Message sent to team.  
Per Cierra Goldberg CNP, she will send Josey to her pharmacy.    Called Dora back to let her know. Understanding verbalized with teach back. She expressed appreciation to the team and will call back if symptoms persist. No further needs at this time.  
negative...

## 2025-08-04 NOTE — PROGRESS NOTES
Secure chat message received:  Patient states that she was in [place] for vacation and returned congested with a cough. She has had these symptoms now for 7 days.    She had a CT scan and viewed her results online. She states that it showed pneumonia in her right upper lobe.    She did call her PCP as well but has not heard back.    She went to an urgent care a few days ago. They did not swab her but recommended Flonase and Afrin nasal sprays for the ear fullness and congestion.    She endorses feeling feverish but has not taken her temperature and productive cough at night with yellow sputum. Denies SOB, chest pain, hemoptysis, fatigue or N/V.    She is unsure about sick contacts.  COVID test was negative.     Rx sent in for Levaquin ATB.  Existing appt 8/7/25.

## 2025-08-06 ENCOUNTER — TELEMEDICINE (OUTPATIENT)
Dept: SURGERY | Facility: CLINIC | Age: 65
End: 2025-08-06
Payer: MEDICARE

## 2025-08-06 DIAGNOSIS — C34.11 PRIMARY MALIGNANT NEOPLASM OF RIGHT UPPER LOBE OF LUNG (MULTI): Primary | ICD-10-CM

## 2025-08-06 PROCEDURE — 99212 OFFICE O/P EST SF 10 MIN: CPT | Performed by: THORACIC SURGERY (CARDIOTHORACIC VASCULAR SURGERY)

## 2025-08-06 PROCEDURE — 1126F AMNT PAIN NOTED NONE PRSNT: CPT | Performed by: THORACIC SURGERY (CARDIOTHORACIC VASCULAR SURGERY)

## 2025-08-06 PROCEDURE — 1159F MED LIST DOCD IN RCRD: CPT | Performed by: THORACIC SURGERY (CARDIOTHORACIC VASCULAR SURGERY)

## 2025-08-06 ASSESSMENT — LIFESTYLE VARIABLES
SKIP TO QUESTIONS 9-10: 1
AUDIT-C TOTAL SCORE: 1
HOW OFTEN DO YOU HAVE A DRINK CONTAINING ALCOHOL: MONTHLY OR LESS
HOW OFTEN DO YOU HAVE SIX OR MORE DRINKS ON ONE OCCASION: NEVER
HOW MANY STANDARD DRINKS CONTAINING ALCOHOL DO YOU HAVE ON A TYPICAL DAY: 1 OR 2

## 2025-08-06 ASSESSMENT — ENCOUNTER SYMPTOMS
LOSS OF SENSATION IN FEET: 0
DEPRESSION: 0
OCCASIONAL FEELINGS OF UNSTEADINESS: 0

## 2025-08-06 ASSESSMENT — PAIN SCALES - GENERAL: PAINLEVEL_OUTOF10: 0-NO PAIN

## 2025-08-07 ENCOUNTER — OFFICE VISIT (OUTPATIENT)
Dept: HEMATOLOGY/ONCOLOGY | Facility: CLINIC | Age: 65
End: 2025-08-07
Payer: MEDICARE

## 2025-08-07 VITALS
BODY MASS INDEX: 25.06 KG/M2 | OXYGEN SATURATION: 99 % | HEART RATE: 95 BPM | SYSTOLIC BLOOD PRESSURE: 113 MMHG | WEIGHT: 137 LBS | DIASTOLIC BLOOD PRESSURE: 71 MMHG

## 2025-08-07 DIAGNOSIS — C34.90 MALIGNANT NEOPLASM OF LUNG, UNSPECIFIED LATERALITY, UNSPECIFIED PART OF LUNG (MULTI): ICD-10-CM

## 2025-08-07 DIAGNOSIS — C34.11 PRIMARY MALIGNANT NEOPLASM OF RIGHT UPPER LOBE OF LUNG (MULTI): ICD-10-CM

## 2025-08-07 PROCEDURE — 99213 OFFICE O/P EST LOW 20 MIN: CPT | Performed by: NURSE PRACTITIONER

## 2025-08-07 PROCEDURE — 3078F DIAST BP <80 MM HG: CPT | Performed by: NURSE PRACTITIONER

## 2025-08-07 PROCEDURE — 3074F SYST BP LT 130 MM HG: CPT | Performed by: NURSE PRACTITIONER

## 2025-08-07 PROCEDURE — G2211 COMPLEX E/M VISIT ADD ON: HCPCS | Performed by: NURSE PRACTITIONER

## 2025-08-07 PROCEDURE — 1159F MED LIST DOCD IN RCRD: CPT | Performed by: NURSE PRACTITIONER

## 2025-08-07 ASSESSMENT — ENCOUNTER SYMPTOMS
PSYCHIATRIC NEGATIVE: 1
CONSTIPATION: 0
RESPIRATORY NEGATIVE: 1
FEVER: 0
DIARRHEA: 1
MUSCULOSKELETAL NEGATIVE: 1
FREQUENCY: 0
APPETITE CHANGE: 0
CONSTITUTIONAL NEGATIVE: 1
COUGH: 0
FATIGUE: 0
NEUROLOGICAL NEGATIVE: 1
CARDIOVASCULAR NEGATIVE: 1
SHORTNESS OF BREATH: 0

## 2025-08-07 NOTE — PROGRESS NOTES
[facility] - Medical Oncology Follow-Up Visit    Patient ID: Dora Dueñas is a 65 y.o. female with stage IIb lung adenocarcinoma s/p RUL segmentectomy, with TP53 mutation and PD-L1 60%.     Current therapy: surveillance     Chief Concern: Here for follow-up     HPI: Patient present in clinic for follow-up visit. 8/4/25 call to triage line with c/o feeling feverish, cough, and congestion.    Feeling 100% better with ATB. Has two remaining days.  Denies ASE.  Good appetite.  Denies n/v/c.  Intermittent diarrhea - not able to report pattern.  She will keep diary.  Visit with Dr. Spencer yesterday.  He discussed CT scan results with pt.  Labs reviewed.  Birthday dinner with her children planned for today.  No other concerns.      Diagnosis: Lung adenocarcinoma  Stage:  Cancer Staging   Primary malignant neoplasm of right upper lobe of lung (Multi)  Staging form: Lung, AJCC 8th Edition  - Pathologic: Stage IIB (pT1c, pN1, cM0) - Signed by Keena Roy MD on 9/25/2023     Current sites of disease: J[facility]ITO s/p RUL segmentectomy  Molecular and ancillary testing:   NGS with TP53 mutation  PD-L1 60%    Oncologic History:  1/11/23 - cardiac CT with RUL nodule  2/14/23 - CT chest with RUL nodule  3/17/23 - PET with FDG-avid RUL nodule no suspicious LNs  6/5/23 - RUL segmentectomy and LN dissection with pT1cN1 adenocarcinoma; 1 peribronchial LN positive for malignancy  8/7/23 -10/9/23 - adjuvant cisplatin/pemetrexed (completed 4 cycles)  11/10/23 - C1 adjuvant atezolizumab  2/6/24 - ED visit 2/2 SOB, increased cough (-) PE, work-up (-)  3/29/24 - C6 atezo, last dose per patient preference    Past Medical/Surgical Hx:   HTN  HLD  osteoarthritis  osteoporosis     Social Hx:   +tobacco use 1ppd since age 23, has quit at times in the past including recently prior to surgery, recently started again. Laser therapy helped for 3m  Lives at home with adult son whom she cares for (has autism) and her adult  daughter who is a big support   Lots of friends and community who support her as well     Family Hx:   Paternal grandfather had lung cancer - was a smoker    Meds (Current):    Current Outpatient Medications:     ALPRAZolam (Xanax) 0.5 mg tablet, Take 1 tablet (0.5 mg) by mouth if needed., Disp: , Rfl:     atorvastatin (Lipitor) 40 mg tablet, Take 1 tablet (40 mg) by mouth once daily., Disp: 90 tablet, Rfl: 1    buPROPion XL (Wellbutrin XL) 150 mg 24 hr tablet, TAKE ONE TABLET BY MOUTH IN THE MORNING AS DIRECTED, Disp: , Rfl:     cholecalciferol (Vitamin D-3) 25 MCG (1000 UT) capsule, Take 1 capsule (25 mcg) by mouth once daily., Disp: , Rfl:     escitalopram (Lexapro) 20 mg tablet, TAKE TWO TABLETS BY MOUTH DAILY IN THE MORNING AS DIRECTED., Disp: , Rfl:     hydrocortisone (Anusol-HC) 2.5 % rectal cream, Insert into the rectum 4 times a day as needed for hemorrhoids (rectal discomfort). Apply to affected areas, Disp: 30 g, Rfl: 0    levoFLOXacin (Levaquin) 750 mg tablet, Take 1 tablet (750 mg) by mouth once daily for 5 days., Disp: 5 tablet, Rfl: 0    losartan (Cozaar) 100 mg tablet, TAKE ONE TABLET BY MOUTH EVERY DAY, Disp: 90 tablet, Rfl: 0    nicotine (Nicoderm CQ) 14 mg/24 hr patch, Place 1 patch over 24 hours on the skin once every 24 hours., Disp: 30 patch, Rfl: 0    nicotine (Nicoderm CQ) 21 mg/24 hr patch, Place 1 patch over 24 hours on the skin once every 24 hours., Disp: 30 patch, Rfl: 0    nicotine (Nicoderm CQ) 7 mg/24 hr patch, Place 1 patch over 24 hours on the skin once every 24 hours., Disp: 30 patch, Rfl: 0    Review of Systems   Constitutional: Negative.  Negative for appetite change, fatigue and fever.   HENT:  Negative.     Respiratory: Negative.  Negative for cough and shortness of breath.    Cardiovascular: Negative.    Gastrointestinal:  Positive for diarrhea. Negative for constipation.   Genitourinary: Negative.  Negative for frequency.    Musculoskeletal: Negative.    Skin: Negative.     Neurological: Negative.    Psychiatric/Behavioral: Negative.        Objective   BSA: 1.65 meters squared  /71 (BP Location: Left arm, Patient Position: Sitting)   Pulse 95   Wt 62.1 kg (137 lb)   SpO2 99%   BMI 25.06 kg/m²     Wt Readings from Last 5 Encounters:   08/07/25 62.1 kg (137 lb)   04/03/25 64.2 kg (141 lb 9.6 oz)   09/22/24 59 kg (130 lb)   09/05/24 65 kg (143 lb 4.8 oz)   06/24/24 62.6 kg (138 lb)      Performance Status:  Asymptomatic - ECOG 0     Physical Exam  Vitals reviewed.   Constitutional:       Appearance: Normal appearance.   HENT:      Head: Normocephalic and atraumatic.      Nose: Nose normal.      Mouth/Throat:      Mouth: Mucous membranes are moist.      Pharynx: Oropharynx is clear.     Eyes:      Extraocular Movements: Extraocular movements intact.      Conjunctiva/sclera: Conjunctivae normal.      Pupils: Pupils are equal, round, and reactive to light.       Cardiovascular:      Rate and Rhythm: Normal rate and regular rhythm.      Pulses: Normal pulses.      Heart sounds: Normal heart sounds.   Pulmonary:      Effort: Pulmonary effort is normal.      Breath sounds: Normal breath sounds.   Abdominal:      General: Bowel sounds are normal. There is no distension.      Palpations: Abdomen is soft.     Musculoskeletal:         General: Normal range of motion.      Cervical back: Normal range of motion and neck supple.     Skin:     General: Skin is warm and dry.      Capillary Refill: Capillary refill takes less than 2 seconds.     Neurological:      General: No focal deficit present.      Mental Status: She is alert and oriented to person, place, and time.     Psychiatric:         Mood and Affect: Mood normal.         Behavior: Behavior normal.         Thought Content: Thought content normal.         Judgment: Judgment normal.        Results:  Labs:  Lab Results   Component Value Date    WBC 9.9 07/31/2025    HGB 12.2 07/31/2025    HCT 36.7 07/31/2025    MCV 96 07/31/2025      2025      Lab Results   Component Value Date    NEUTROABS 5.96 2025      Lab Results   Component Value Date    GLUCOSE 93 2025    CALCIUM 8.4 (L) 2025     2025    K 3.6 2025    CO2 25 2025    CL 98 2025    BUN 12 2025    CREATININE 0.86 2025    MG 1.63 2024     Lab Results   Component Value Date    ALT 48 (H) 2025    AST 35 2025    ALKPHOS 95 2025    BILITOT 0.6 2025      Imagin/1/25 CT Chest (+):  IMPRESSION:  1. Right upper lobe segmentectomy changes without suspicious nodularity along the suture margins to suggest local recurrence.  2. Interval development of a 9 mm irregular opacity in the right upper lobe favored to represent atelectasis or pneumonia. Attention on follow-up.  3. Severe coronary artery calcifications. Study is not optimized for evaluation of coronary arteries.      Pathology:  No new pathology.    Assessment/Plan      Dora Dueñas is a 65 y.o. female here for follow up of stage IIb lung adenocarcinoma with PD-L1 60% and no actionable mutations on NGS, s/p adjuvant chemotherapy after RUL segmentectomy.    She tolerated adjuvant chemotherapy well with progressive fatigue after the 4th cycle. She started adjuvant immunotherapy. Subsequently endorsed ongoing severe fatigue, intermittent diarrhea. Discussed that although not meeting clear criteria for IRAE it may be related to her immunotherapy. She was frustrated with how she feels and needed a break.     S/p 5 days of prednisone. Overall she felt much improved, glad to be able to do more and have more energy. Discussed options moving forward including retrial of immunotherapy, though with risk that fatigue will recur, vs proceeding with surveillance. Ultimately she elected to proceed with surveillance.    Doing well, scans with no evidence of recurrent disease.    25 CT scan reflects 9mm irregular opacity.  Pt called triage line  8/4/25 - symptomatic - feverish, congested with cough, COVID(-). Symptoms for 7 days.  Levaquin 5-day course Rx proved with noted improvement today.  Dr. Spencer ordered 3-mo repeat CT scan.  Scan scheduled 11/3/25.       #Stage IIb lung adenocarcinoma -   - Stopped adjuvant atezolizumab (completed 6/17 cycles)  - Plan CT Chest with IV contrast q6m for now, no evidence of recurrence 8/2024. Next scan ordered today - scheduled 2/2/26.      #Fatigue - was severe, now resolved  #Diarrhea - now resolved  - TSH normal    # Urinary frequency/trace hematuria 1+ -> (repeat UA) 0.03.  - Frequency symptom resolving  - Denies dysuria, trauma, recent UTI(s)  - No gross hematuria  - No hx of microscopic hematuria  - Will follow-up with her PCP, may be related to post-menopausal state  - Renal ultrasound was normal  - Discussed can consider urology referral if persists     RTC in 6 months with scan review, encouraged her to call if any issues prior to follow-up. Continue scans q6m for first 2 years then can space to yearly.

## 2025-08-12 ENCOUNTER — TELEPHONE (OUTPATIENT)
Dept: HEMATOLOGY/ONCOLOGY | Facility: HOSPITAL | Age: 65
End: 2025-08-12
Payer: MEDICARE

## 2025-08-12 DIAGNOSIS — R09.89 CHEST CONGESTION: Primary | ICD-10-CM

## 2025-08-15 ENCOUNTER — HOSPITAL ENCOUNTER (OUTPATIENT)
Dept: RADIOLOGY | Facility: HOSPITAL | Age: 65
Discharge: HOME | End: 2025-08-15
Payer: MEDICARE

## 2025-08-15 DIAGNOSIS — R09.89 CHEST CONGESTION: ICD-10-CM

## 2025-08-15 PROCEDURE — 71046 X-RAY EXAM CHEST 2 VIEWS: CPT

## 2025-09-03 ENCOUNTER — OFFICE VISIT (OUTPATIENT)
Dept: PRIMARY CARE | Facility: CLINIC | Age: 65
End: 2025-09-03
Payer: MEDICARE

## 2025-09-03 VITALS
DIASTOLIC BLOOD PRESSURE: 70 MMHG | HEIGHT: 63 IN | SYSTOLIC BLOOD PRESSURE: 138 MMHG | WEIGHT: 140 LBS | HEART RATE: 93 BPM | BODY MASS INDEX: 24.8 KG/M2 | TEMPERATURE: 98 F | OXYGEN SATURATION: 93 %

## 2025-09-03 DIAGNOSIS — J01.00 ACUTE NON-RECURRENT MAXILLARY SINUSITIS: Primary | ICD-10-CM

## 2025-09-03 RX ORDER — AZITHROMYCIN 250 MG/1
TABLET, FILM COATED ORAL
Qty: 6 TABLET | Refills: 0 | Status: SHIPPED | OUTPATIENT
Start: 2025-09-03 | End: 2025-09-08

## 2025-09-03 RX ORDER — METHYLPREDNISOLONE 4 MG/1
TABLET ORAL
Qty: 21 TABLET | Refills: 0 | Status: SHIPPED | OUTPATIENT
Start: 2025-09-03 | End: 2025-09-09

## 2025-11-03 ENCOUNTER — APPOINTMENT (OUTPATIENT)
Dept: RADIOLOGY | Facility: CLINIC | Age: 65
End: 2025-11-03
Payer: MEDICARE